# Patient Record
Sex: FEMALE | Race: WHITE | NOT HISPANIC OR LATINO | Employment: FULL TIME | URBAN - METROPOLITAN AREA
[De-identification: names, ages, dates, MRNs, and addresses within clinical notes are randomized per-mention and may not be internally consistent; named-entity substitution may affect disease eponyms.]

---

## 2017-06-08 ENCOUNTER — OFFICE VISIT (OUTPATIENT)
Dept: MEDICAL GROUP | Facility: PHYSICIAN GROUP | Age: 47
End: 2017-06-08
Payer: COMMERCIAL

## 2017-06-08 VITALS
WEIGHT: 203 LBS | BODY MASS INDEX: 32.78 KG/M2 | OXYGEN SATURATION: 96 % | SYSTOLIC BLOOD PRESSURE: 92 MMHG | TEMPERATURE: 99.3 F | DIASTOLIC BLOOD PRESSURE: 60 MMHG | RESPIRATION RATE: 16 BRPM | HEART RATE: 72 BPM

## 2017-06-08 DIAGNOSIS — G89.29 CHRONIC CERVICAL PAIN: ICD-10-CM

## 2017-06-08 DIAGNOSIS — E03.4 HYPOTHYROIDISM DUE TO ACQUIRED ATROPHY OF THYROID: ICD-10-CM

## 2017-06-08 DIAGNOSIS — G43.109 MIGRAINE WITH AURA AND WITHOUT STATUS MIGRAINOSUS, NOT INTRACTABLE: ICD-10-CM

## 2017-06-08 DIAGNOSIS — Z00.00 WELL ADULT EXAM: ICD-10-CM

## 2017-06-08 DIAGNOSIS — M54.2 CHRONIC CERVICAL PAIN: ICD-10-CM

## 2017-06-08 PROCEDURE — 99204 OFFICE O/P NEW MOD 45 MIN: CPT | Performed by: FAMILY MEDICINE

## 2017-06-08 RX ORDER — LEVOTHYROXINE SODIUM 13 UG/1
1 CAPSULE ORAL
Qty: 30 CAP | Refills: 5 | Status: SHIPPED | OUTPATIENT
Start: 2017-06-08 | End: 2017-08-30

## 2017-06-08 RX ORDER — DULOXETIN HYDROCHLORIDE 60 MG/1
60 CAPSULE, DELAYED RELEASE ORAL DAILY
Qty: 30 CAP | Refills: 5 | Status: SHIPPED | OUTPATIENT
Start: 2017-06-08 | End: 2018-06-11 | Stop reason: SDUPTHER

## 2017-06-08 ASSESSMENT — ENCOUNTER SYMPTOMS
CONSTIPATION: 0
CARDIOVASCULAR NEGATIVE: 1
DIZZINESS: 0
PALPITATIONS: 0
COUGH: 0
RESPIRATORY NEGATIVE: 1
EYES NEGATIVE: 1
CHILLS: 0
HEADACHES: 0
PSYCHIATRIC NEGATIVE: 1
MYALGIAS: 1
NEUROLOGICAL NEGATIVE: 1
GASTROINTESTINAL NEGATIVE: 1
FEVER: 0
HEMOPTYSIS: 0
NECK PAIN: 1
CONSTITUTIONAL NEGATIVE: 1

## 2017-06-08 ASSESSMENT — PATIENT HEALTH QUESTIONNAIRE - PHQ9: CLINICAL INTERPRETATION OF PHQ2 SCORE: 0

## 2017-06-08 NOTE — MR AVS SNAPSHOT
Leigh Gutierrez   2017 8:45 AM   Office Visit   MRN: [id number]    Department:  [facility]    Dept Phone:  [phone]    Description:  Female : 1970   Provider:  Steve Lezama M.D.           Reason for Visit     Establish Care     Medication Refill     Referral Needed     Orders Needed mammogram       Allergies as of 2017     Allergen Noted Reactions    Ultram [Tramadol Hcl] 2012   Itching      You were diagnosed with     Chronic cervical pain   [298194]       Migraine with aura and without status migrainosus, not intractable   [980914]       Hypothyroidism due to acquired atrophy of thyroid   [7431644]       Well adult exam   [515528]         Vital Signs     Blood Pressure Pulse Temperature Respirations Weight Oxygen Saturation    92/60 mmHg 72 37.4 °C (99.3 °F) 16 92.08 kg (203 lb) 96%    Smoking Status                   Never Smoker            Basic Information     Date Of Birth Sex Race Ethnicity Preferred Language    1970 Female White Non- English      Problem List              ICD-10-CM Priority Class Noted - Resolved    Anxiety F41.9   2012 - Present    Hypothyroidism E03.9   2012 - Present    Hx of Lyme disease Z86.19   2014 - Present      Health Maintenance        Date Due Completion Dates    IMM DTaP/Tdap/Td Vaccine (1 - Tdap) 1990 ---    MAMMOGRAM 2011 ---    PAP SMEAR 2018 (Done), 2012 (N/S)    Override on 2015: Done    Override on 2012: (N/S)            Current Immunizations     No immunizations on file.      Below and/or attached are the medications your provider expects you to take. Review all of your home medications and newly ordered medications with your provider and/or pharmacist. Follow medication instructions as directed by your provider and/or pharmacist. Please keep your medication list with you and share with your provider. Update the information when medications are discontinued, doses are  changed, or new medications (including over-the-counter products) are added; and carry medication information at all times in the event of emergency situations     Allergies:  ULTRAM - Itching               Medications  Valid as of: June 08, 2017 -  9:22 AM    Generic Name Brand Name Tablet Size Instructions for use    DiazePAM (Tab) VALIUM 5 MG Take 1 Tab by mouth every 8 hours as needed for Anxiety or Sleep.        DULoxetine HCl (Cap DR Particles) CYMBALTA 60 MG Take 1 Cap by mouth every day.        Levothyroxine Sodium (Cap) Levothyroxine Sodium 150 MCG Take 1 Cap by mouth every day.        SUMAtriptan Succinate (Solution) IMITREX 6 MG/0.5ML Inject 6 mg as instructed Once. At onset of migraine        .                 Medicines prescribed today were sent to:     [id number] - [place], [place] - [address] [facility]    [address] [facility] [address]    Phone: [phone] Fax: [phone]    Open 24 Hours?: No      Medication refill instructions:       If your prescription bottle indicates you have medication refills left, it is not necessary to call your provider’s office. Please contact your pharmacy and they will refill your medication.    If your prescription bottle indicates you do not have any refills left, you may request refills at any time through one of the following ways: The online [employer] system (except Urgent Care), by calling your provider’s office, or by asking your pharmacy to contact your provider’s office with a refill request. Medication refills are processed only during regular business hours and may not be available until the next business day. Your provider may request additional information or to have a follow-up visit with you prior to refilling your medication.   *Please Note: Medication refills are assigned a new Rx number when refilled electronically. Your pharmacy may indicate that no refills were authorized even though a new prescription for the same  medication is available at the pharmacy. Please request the medicine by name with the pharmacy before contacting your provider for a refill.        Your To Do List     Future Labs/Procedures Complete By Expires    CBC WITHOUT DIFFERENTIAL  As directed 12/9/2017    COMP METABOLIC PANEL  As directed 6/8/2018    FREE THYROXINE  As directed 6/8/2018    LIPID PROFILE  As directed 6/8/2018    MA-SCREENING DIGITAL MAMMO  As directed 6/8/2018    TRIIDOTHYRONINE  As directed 6/8/2018    TSH  As directed 6/8/2018    VITAMIN D,25 HYDROXY  As directed 6/8/2018      Referral     A referral request has been sent to our patient care coordination department. Please allow 3-5 business days for us to process this request and contact you either by phone or mail. If you do not hear from us by the 5th business day, please call us at [phone].           [employer] Access Code: Activation code not generated  Current [employer] Status: Active

## 2017-06-08 NOTE — PROGRESS NOTES
Subjective:      Leigh Whitley is a 46 y.o. female who presents with Establish Care; Medication Refill; Referral Needed; and Orders Needed            HPI Comments: 1. Chronic cervical pain    - REFERRAL TO PAIN CLINIC  - Levothyroxine Sodium (TIROSINT) 150 MCG Cap; Take 1 Cap by mouth every day.  Dispense: 30 Cap; Refill: 5  - duloxetine (CYMBALTA) 60 MG Cap DR Particles delayed-release capsule; Take 1 Cap by mouth every day.  Dispense: 30 Cap; Refill: 5  - FREE THYROXINE; Future  - COMP METABOLIC PANEL; Future  - LIPID PROFILE; Future  - TRIIDOTHYRONINE; Future  - TSH; Future  - VITAMIN D,25 HYDROXY; Future  - CBC WITHOUT DIFFERENTIAL; Future    2. Migraine with aura and without status migrainosus, not intractable    - REFERRAL TO PAIN CLINIC  - Levothyroxine Sodium (TIROSINT) 150 MCG Cap; Take 1 Cap by mouth every day.  Dispense: 30 Cap; Refill: 5  - duloxetine (CYMBALTA) 60 MG Cap DR Particles delayed-release capsule; Take 1 Cap by mouth every day.  Dispense: 30 Cap; Refill: 5  - FREE THYROXINE; Future  - COMP METABOLIC PANEL; Future  - LIPID PROFILE; Future  - TRIIDOTHYRONINE; Future  - TSH; Future  - VITAMIN D,25 HYDROXY; Future  - CBC WITHOUT DIFFERENTIAL; Future    3. Hypothyroidism due to acquired atrophy of thyroid    - REFERRAL TO PAIN CLINIC  - Levothyroxine Sodium (TIROSINT) 150 MCG Cap; Take 1 Cap by mouth every day.  Dispense: 30 Cap; Refill: 5  - duloxetine (CYMBALTA) 60 MG Cap DR Particles delayed-release capsule; Take 1 Cap by mouth every day.  Dispense: 30 Cap; Refill: 5  - FREE THYROXINE; Future  - COMP METABOLIC PANEL; Future  - LIPID PROFILE; Future  - TRIIDOTHYRONINE; Future  - TSH; Future  - VITAMIN D,25 HYDROXY; Future  - CBC WITHOUT DIFFERENTIAL; Future    4. Well adult exam    - MA-SCREENING DIGITAL MAMMO; Future    Past Medical History:    Hypothyroid                                                   Lyme disease                                                Past Surgical History:     HYSTERECTOMY, TOTAL ABDOMINAL                    2012          CHOLECYSTECTOMY                                  2002          APPENDECTOMY                                     2012          GASTRIC BANDING LAPAROSCOPIC                     2008          TUBAL LIGATION                                                 LAMINOTOMY                                                     Smoking Status: Never Smoker                      Smokeless Status: Never Used                        Alcohol Use: Yes                Comment: occ    Review of patient's family history indicates:    Cancer                         Mother                      Current outpatient prescriptions: •  Levothyroxine Sodium (TIROSINT) 150 MCG Cap, Take 1 Cap by mouth every day., Disp: 30 Cap, Rfl: 5•  duloxetine (CYMBALTA) 60 MG Cap DR Particles delayed-release capsule, Take 1 Cap by mouth every day., Disp: 30 Cap, Rfl: 5•  diazepam (VALIUM) 5 MG TABS, Take 1 Tab by mouth every 8 hours as needed for Anxiety or Sleep., Disp: 30 Tab, Rfl: 1•  SUMAtriptan Succinate (IMITREX) 6 MG/0.5ML SOLN, Inject 6 mg as instructed Once. At onset of migraine, Disp: , Rfl:         Neck Pain   This is a chronic problem. The current episode started more than 1 year ago. The problem occurs intermittently. The problem has been waxing and waning. Associated with: 'lyme disease' according to patient has had nerve ablations in the past with benefit. The pain is present in the left side and right side. The pain is moderate. Nothing aggravates the symptoms. Pertinent negatives include no chest pain, fever or headaches. Treatments tried: nerve ablations. The treatment provided moderate relief.       Review of Systems   Constitutional: Negative.  Negative for fever and chills.        Past Medical History:    Hypothyroid                                                   Lyme disease                                                Past Surgical History:    HYSTERECTOMY, TOTAL ABDOMINAL                     2012          CHOLECYSTECTOMY                                  2002          APPENDECTOMY                                     2012          GASTRIC BANDING LAPAROSCOPIC                     2008          TUBAL LIGATION                                                 LAMINOTOMY                                                     Smoking Status: Never Smoker                      Smokeless Status: Never Used                        Alcohol Use: Yes                Comment: occ    Review of patient's family history indicates:    Cancer                         Mother                     HENT: Negative.    Eyes: Negative.    Respiratory: Negative.  Negative for cough and hemoptysis.    Cardiovascular: Negative.  Negative for chest pain and palpitations.   Gastrointestinal: Negative.  Negative for constipation.   Genitourinary: Negative.  Negative for dysuria and urgency.   Musculoskeletal: Positive for myalgias and neck pain.   Skin: Negative.  Negative for rash.   Neurological: Negative.  Negative for dizziness and headaches.   Endo/Heme/Allergies: Negative.    Psychiatric/Behavioral: Negative.  Negative for suicidal ideas.          Objective:     BP 92/60 mmHg  Pulse 72  Temp(Src) 37.4 °C (99.3 °F)  Resp 16  Wt 92.08 kg (203 lb)  SpO2 96%     Physical Exam   Constitutional: She is oriented to person, place, and time. No distress.   HENT:   Head: Normocephalic and atraumatic.   Right Ear: External ear normal.   Left Ear: External ear normal.   Nose: Nose normal.   Mouth/Throat: Oropharynx is clear and moist. No oropharyngeal exudate.   Eyes: Pupils are equal, round, and reactive to light. Right eye exhibits no discharge. Left eye exhibits no discharge. No scleral icterus.   Neck: Normal range of motion. Neck supple. No JVD present. No tracheal deviation present. No thyromegaly present.   Cardiovascular: Normal rate, regular rhythm, normal heart sounds and intact distal pulses.  Exam reveals no gallop  and no friction rub.    No murmur heard.  Pulmonary/Chest: Effort normal and breath sounds normal. No stridor. No respiratory distress. She has no wheezes. She has no rales. She exhibits no tenderness.   Abdominal: Soft. She exhibits no distension. There is no tenderness.   Lymphadenopathy:     She has no cervical adenopathy.   Neurological: She is alert and oriented to person, place, and time.   Skin: Skin is warm and dry. She is not diaphoretic.   Psychiatric: Judgment normal.   Nursing note and vitals reviewed.              Assessment/Plan:     1. Chronic cervical pain    - REFERRAL TO PAIN CLINIC  - Levothyroxine Sodium (TIROSINT) 150 MCG Cap; Take 1 Cap by mouth every day.  Dispense: 30 Cap; Refill: 5  - duloxetine (CYMBALTA) 60 MG Cap DR Particles delayed-release capsule; Take 1 Cap by mouth every day.  Dispense: 30 Cap; Refill: 5  - FREE THYROXINE; Future  - COMP METABOLIC PANEL; Future  - LIPID PROFILE; Future  - TRIIDOTHYRONINE; Future  - TSH; Future  - VITAMIN D,25 HYDROXY; Future  - CBC WITHOUT DIFFERENTIAL; Future    2. Migraine with aura and without status migrainosus, not intractable    - REFERRAL TO PAIN CLINIC  - Levothyroxine Sodium (TIROSINT) 150 MCG Cap; Take 1 Cap by mouth every day.  Dispense: 30 Cap; Refill: 5  - duloxetine (CYMBALTA) 60 MG Cap DR Particles delayed-release capsule; Take 1 Cap by mouth every day.  Dispense: 30 Cap; Refill: 5  - FREE THYROXINE; Future  - COMP METABOLIC PANEL; Future  - LIPID PROFILE; Future  - TRIIDOTHYRONINE; Future  - TSH; Future  - VITAMIN D,25 HYDROXY; Future  - CBC WITHOUT DIFFERENTIAL; Future    3. Hypothyroidism due to acquired atrophy of thyroid    - REFERRAL TO PAIN CLINIC  - Levothyroxine Sodium (TIROSINT) 150 MCG Cap; Take 1 Cap by mouth every day.  Dispense: 30 Cap; Refill: 5  - duloxetine (CYMBALTA) 60 MG Cap DR Particles delayed-release capsule; Take 1 Cap by mouth every day.  Dispense: 30 Cap; Refill: 5  - FREE THYROXINE; Future  - COMP METABOLIC  PANEL; Future  - LIPID PROFILE; Future  - TRIIDOTHYRONINE; Future  - TSH; Future  - VITAMIN D,25 HYDROXY; Future  - CBC WITHOUT DIFFERENTIAL; Future    4. Well adult exam    - MA-SCREENING DIGITAL MAMMO; Future

## 2017-06-14 ENCOUNTER — TELEPHONE (OUTPATIENT)
Dept: MEDICAL GROUP | Facility: PHYSICIAN GROUP | Age: 47
End: 2017-06-14

## 2017-06-14 LAB
25(OH)D3+25(OH)D2 SERPL-MCNC: 31.9 NG/ML (ref 30–100)
ALBUMIN SERPL-MCNC: 4.1 G/DL (ref 3.5–5.5)
ALBUMIN/GLOB SERPL: 1.5 {RATIO} (ref 1.2–2.2)
ALP SERPL-CCNC: 82 IU/L (ref 39–117)
ALT SERPL-CCNC: 15 IU/L (ref 0–32)
AST SERPL-CCNC: 21 IU/L (ref 0–40)
BILIRUB SERPL-MCNC: 0.4 MG/DL (ref 0–1.2)
BUN SERPL-MCNC: 18 MG/DL (ref 6–24)
BUN/CREAT SERPL: 20 (ref 9–23)
CALCIUM SERPL-MCNC: 9.3 MG/DL (ref 8.7–10.2)
CHLORIDE SERPL-SCNC: 103 MMOL/L (ref 96–106)
CHOLEST SERPL-MCNC: 203 MG/DL (ref 100–199)
CO2 SERPL-SCNC: 24 MMOL/L (ref 18–29)
COMMENT 011824: ABNORMAL
CREAT SERPL-MCNC: 0.9 MG/DL (ref 0.57–1)
ERYTHROCYTE [DISTWIDTH] IN BLOOD BY AUTOMATED COUNT: 13.2 % (ref 12.3–15.4)
GLOBULIN SER CALC-MCNC: 2.7 G/DL (ref 1.5–4.5)
GLUCOSE SERPL-MCNC: 94 MG/DL (ref 65–99)
HCT VFR BLD AUTO: 43.7 % (ref 34–46.6)
HDLC SERPL-MCNC: 42 MG/DL
HGB BLD-MCNC: 14.3 G/DL (ref 11.1–15.9)
LDLC SERPL CALC-MCNC: 131 MG/DL (ref 0–99)
MCH RBC QN AUTO: 31.2 PG (ref 26.6–33)
MCHC RBC AUTO-ENTMCNC: 32.7 G/DL (ref 31.5–35.7)
MCV RBC AUTO: 95 FL (ref 79–97)
NRBC BLD AUTO-RTO: NORMAL %
PLATELET # BLD AUTO: 220 X10E3/UL (ref 150–379)
POTASSIUM SERPL-SCNC: 4.2 MMOL/L (ref 3.5–5.2)
PROT SERPL-MCNC: 6.8 G/DL (ref 6–8.5)
RBC # BLD AUTO: 4.59 X10E6/UL (ref 3.77–5.28)
SODIUM SERPL-SCNC: 141 MMOL/L (ref 134–144)
T3FREE SERPL-MCNC: 3.4 PG/ML (ref 2–4.4)
T4 FREE SERPL-MCNC: 1.2 NG/DL (ref 0.82–1.77)
TRIGL SERPL-MCNC: 150 MG/DL (ref 0–149)
TSH SERPL DL<=0.005 MIU/L-ACNC: 0.76 UIU/ML (ref 0.45–4.5)
VLDLC SERPL CALC-MCNC: 30 MG/DL (ref 5–40)
WBC # BLD AUTO: 4.3 X10E3/UL (ref 3.4–10.8)

## 2017-06-14 NOTE — TELEPHONE ENCOUNTER
Phone Number Called: [phone] (home)       Message: Left vm relaying the Dr's message to the patient     Left Message for patient to call back yes if patient has any questions

## 2017-08-29 ENCOUNTER — OFFICE VISIT (OUTPATIENT)
Dept: MEDICAL GROUP | Facility: PHYSICIAN GROUP | Age: 47
End: 2017-08-29
Payer: COMMERCIAL

## 2017-08-29 DIAGNOSIS — H35.061 RETINAL VASCULITIS, RIGHT: ICD-10-CM

## 2017-08-29 DIAGNOSIS — Z86.19 HX OF LYME DISEASE: ICD-10-CM

## 2017-08-29 DIAGNOSIS — Z12.31 ENCOUNTER FOR SCREENING MAMMOGRAM FOR BREAST CANCER: ICD-10-CM

## 2017-08-29 DIAGNOSIS — R09.81 NASAL CONGESTION: ICD-10-CM

## 2017-08-29 DIAGNOSIS — E66.9 OBESITY (BMI 30-39.9): ICD-10-CM

## 2017-08-29 DIAGNOSIS — E03.4 HYPOTHYROIDISM DUE TO ACQUIRED ATROPHY OF THYROID: ICD-10-CM

## 2017-08-29 PROBLEM — H35.069 RETINAL VASCULITIS: Status: ACTIVE | Noted: 2017-08-29

## 2017-08-29 PROBLEM — F12.10 MARIJUANA ABUSE: Status: ACTIVE | Noted: 2017-08-29

## 2017-08-29 PROCEDURE — 99214 OFFICE O/P EST MOD 30 MIN: CPT | Performed by: NURSE PRACTITIONER

## 2017-08-29 RX ORDER — FLUTICASONE PROPIONATE 50 MCG
2 SPRAY, SUSPENSION (ML) NASAL 2 TIMES DAILY PRN
Qty: 1 BOTTLE | Refills: 3 | Status: SHIPPED | OUTPATIENT
Start: 2017-08-29 | End: 2019-03-07

## 2017-08-29 RX ORDER — LEVOTHYROXINE SODIUM 0.15 MG/1
150 TABLET ORAL
Qty: 30 TAB | Refills: 0 | Status: SHIPPED | OUTPATIENT
Start: 2017-08-29 | End: 2017-12-05 | Stop reason: SDUPTHER

## 2017-08-29 ASSESSMENT — PAIN SCALES - GENERAL: PAINLEVEL: NO PAIN

## 2017-08-29 NOTE — ASSESSMENT & PLAN NOTE
"She is here for post-hospital follow up. Reviewed hospital notes today. On 8/26/17 she was admitted to [facility] ED by opthalmology for intermittent blurred vision of R eye x 2 weeks and dx with retinal vasculitis. She had labs drawn: CMP, CBC,CRP, and SR WNL. MRI and lumbar lumbar puncture ordered- both WNL. She was discharged with referral to rheumatology to r/o systemic diease. She was also advised to follow up with opthalmology.    She was seen by opthalmology today 8/29/17. She states MD reported \"progressive swelling\" of retinal vessels and recommended referral to infectious disease for hx of lyme disease obtained in 1998. Today, she states her vision has slightly improved.    We will request records from [facility]- Dr. Lilly Cortez today.  "

## 2017-08-30 VITALS
DIASTOLIC BLOOD PRESSURE: 72 MMHG | HEART RATE: 69 BPM | TEMPERATURE: 98.1 F | WEIGHT: 203 LBS | RESPIRATION RATE: 16 BRPM | OXYGEN SATURATION: 96 % | SYSTOLIC BLOOD PRESSURE: 110 MMHG | BODY MASS INDEX: 32.62 KG/M2 | HEIGHT: 66 IN

## 2017-08-30 PROBLEM — R09.81 NASAL CONGESTION: Status: ACTIVE | Noted: 2017-08-30

## 2017-08-30 PROBLEM — E66.9 OBESITY (BMI 30-39.9): Status: ACTIVE | Noted: 2017-08-30

## 2017-08-30 PROBLEM — Z91.09 ENVIRONMENTAL ALLERGIES: Status: ACTIVE | Noted: 2017-08-30

## 2017-08-30 NOTE — ASSESSMENT & PLAN NOTE
She is requesting to have thyroid medication changed due to cost. She is currently taking Tirosint 150mg daily. Denies weight gain, depression, skin, nail, or hair changes, constipation, or fatigue.

## 2017-08-30 NOTE — ASSESSMENT & PLAN NOTE
C/o nasal congestion and sneezing. She does report hx of seasonal allergies, however she is not currently taking anything to control sx. Denies cough, fever, chills, sore throat, or sinus pressure. Denies any sick contacts.

## 2017-08-30 NOTE — PROGRESS NOTES
"Subjective:   Leigh Whitley is a 47 y.o. female here today for post-hospital follow-up for retinal vasculitis.    Retinal vasculitis  She is here for post-hospital follow up. Reviewed hospital notes today. On 8/26/17 she was admitted to [facility] ED by opthalmology for intermittent blurred vision of R eye x 2 weeks and dx with retinal vasculitis. She had labs drawn: CMP, CBC,CRP, and SR WNL. MRI and lumbar lumbar puncture ordered- both WNL. She was discharged with referral to rheumatology to r/o systemic diease. She was also advised to follow up with opthalmology.    She was seen by opthalmology today 8/29/17. She states MD reported \"progressive swelling\" of retinal vessels and recommended referral to infectious disease for hx of lyme disease obtained in 1998. Today, she states her vision has slightly improved.    We will request records from [facility]- Dr. Lilly Cortez today.    Hypothyroidism  She is requesting to have thyroid medication changed due to cost. She is currently taking Tirosint 150mg daily. Denies weight gain, depression, skin, nail, or hair changes, constipation, or fatigue.                                                                                                             Nasal congestion  C/o nasal congestion and sneezing. She does report hx of seasonal allergies, however she is not currently taking anything to control sx. Denies cough, fever, chills, sore throat, or sinus pressure. Denies any sick contacts.     Current medicines (including changes today)  Current Outpatient Prescriptions   Medication Sig Dispense Refill   • levothyroxine (SYNTHROID) 150 MCG Tab Take 1 Tab by mouth Every morning on an empty stomach. 30 Tab 0   • fluticasone (FLONASE) 50 MCG/ACT nasal spray Spray 2 Sprays in nose 2 times a day as needed. 1 Bottle 3   • duloxetine (CYMBALTA) 60 MG Cap DR Particles delayed-release capsule Take 1 Cap by mouth every day. 30 Cap 5     No current " facility-administered medications for this visit.      She  has a past medical history of Hypothyroid and Lyme disease.    ROS  Constitutional ROS: No unexpected change in weight, No weakness, No fatigue, No unexplained fevers, sweats, or chills  Eye ROS: No eye pain, redness, discharge. Positive for blurry vision of R eye.  Ear ROS: No ear pain, No recent change in hearing  Mouth/Throat ROS: No sore throat  Pulmonary ROS: No chronic cough, sputum, or hemoptysis, No wheezing, No shortness of breath  Cardiovascular ROS: No chest pain, No edema, No palpitations  Gastrointestinal ROS: No abdominal pain, No significant change in appetite, No nausea, vomiting, diarrhea, or constipation  Musculoskeletal/Extremities ROS: No pain, redness or swelling on the joints  Skin/Integumentary ROS: No evidence of rash  Neurologic ROS: No chronic headaches  Psychiatric ROS: No depression, No anxiety       Objective:     Blood pressure 110/72, pulse 69, temperature 36.7 °C (98.1 °F), resp. rate 16, weight 92.1 kg (203 lb), SpO2 96 %. Body mass index is 32.77 kg/m².   Physical Exam:  Constitutional: Alert, no distress.  Skin: Warm, dry, good turgor, no rashes in visible areas.  Eye: Equal, round and reactive, conjunctiva clear, lids normal.  ENMT: Lips without lesions, good dentition, oropharynx clear. Both ears: external ear canals clear, no redness or drainage.TM intact, pearly grey with landmarks visualized. Nasal mucosa erythematous, swollen, clear nasal drainage.  Neck: Supple. Trachea midline, no masses, no thyromegaly. No cervical or supraclavicular lymphadenopathy  Respiratory: Unlabored respiratory effort, lungs clear to auscultation, no wheezes, no rhonchi.  Cardiovascular: Normal S1, S2, no murmur, no edema.  Psych: Alert and oriented x3, normal affect and mood.    Assessment and Plan:   The following treatment plan was discussed    1. Retinal vasculitis, right  Stable, uncontrolled. Request records from [facility] today.  - REFERRAL TO INFECTIOUS DISEASE    2. Hx of Lyme disease  Stable, uncontrolled.  - REFERRAL TO INFECTIOUS DISEASE    3. Hypothyroidism due to acquired atrophy of thyroid  Stable, well-controlled. Patient will complete bottle of Tirosint, then switch to Synthroid 150mcg. Will plan to draw TSH & T4 levels 6-8 weeks following initiation of new med.  - levothyroxine (SYNTHROID) 150 MCG Tab; Take 1 Tab by mouth Every morning on an empty stomach.  Dispense: 30 Tab; Refill: 0  - TSH WITH REFLEX TO FT4; Future    4. Nasal congestion  Ordered Flonase nasal spray. Recommend using OTC antihistamine daily during   - fluticasone (FLONASE) 50 MCG/ACT nasal spray; Spray 2 Sprays in nose 2 times a day as needed.  Dispense: 1 Bottle; Refill: 3    5. Encounter for screening mammogram for breast cancer  Ordered mammogram today. She was strongly encouraged to schedule appointment.  - MA-SCREEN MAMMO W/CAD-BILAT    6. Obesity (BMI 30-39.9)  Recommend healthy lifestyle modifications, including diet rich in fruits, veggies, whole grains, and low in fat, as well as 40-60min of moderate intense physical activity. Will plan to discuss further at next office visit.  - Patient identified as having weight management issue.  Appropriate orders and counseling given.      Total 25 min spent face-to- face, >50% of total time discussing patient issues and symptoms as listed above in assessment and plan, as well as managing coordination of care for further evaluation and treatment.       Followup: Return if symptoms worsen or fail to improve.       I have reviewed and agree with history, assessment and plan for the office encounter with this mid level provider  No face to face encounter  Suggested changes or f/u : none other than listed  Signed by Steve Lezama M.D., MSc, physician supervisor for this midlevel

## 2017-09-01 ENCOUNTER — TELEPHONE (OUTPATIENT)
Dept: MEDICAL GROUP | Facility: PHYSICIAN GROUP | Age: 47
End: 2017-09-01

## 2017-09-01 NOTE — TELEPHONE ENCOUNTER
For patient to be seen at infectious disease they need records from when patient was first diagnosed with Lyme disease. The doctor she went [place] was in [place] and the Dr was jon plascencia [place], I called on Friday but the office will close, I will call again on Tuesday to see if I can get the records from when she first got diagnosed with lyme disease. There number is [phone]. Then I will have to [facility] and call them at [phone] and get her current records from there. Once records are collected, They will be sent to the infectious disease office at [phone] which is the fax number.

## 2017-09-05 NOTE — TELEPHONE ENCOUNTER
Called [facility] at [phone] and requested records for this patient, once I get them I will send these and the records from when she was first diagnosed with lyme disease to infectious disease.

## 2017-09-28 ENCOUNTER — HOSPITAL ENCOUNTER (OUTPATIENT)
Dept: PAIN MANAGEMENT | Facility: REHABILITATION | Age: 47
End: 2017-09-28
Attending: PAIN MEDICINE
Payer: COMMERCIAL

## 2017-09-28 RX ORDER — BUPIVACAINE HYDROCHLORIDE 7.5 MG/ML
INJECTION, SOLUTION EPIDURAL; RETROBULBAR
Status: DISPENSED
Start: 2017-09-28 | End: 2017-09-28

## 2017-09-28 RX ORDER — TRIAMCINOLONE ACETONIDE 40 MG/ML
INJECTION, SUSPENSION INTRA-ARTICULAR; INTRAMUSCULAR
Status: DISPENSED
Start: 2017-09-28 | End: 2017-09-28

## 2017-09-28 RX ORDER — LIDOCAINE HYDROCHLORIDE 40 MG/ML
INJECTION, SOLUTION RETROBULBAR
Status: DISPENSED
Start: 2017-09-28 | End: 2017-09-28

## 2017-09-28 NOTE — PROGRESS NOTES
Case was cancelled by pt,  Pt. On antibiotics and refuse facet injection procedure, wanted a neurotomy.  Advised pt to call the office and set up an appointment to talk with Dr. Miner. Informed Supervisor of issue and Dr. Miner was informed by her.

## 2017-12-05 DIAGNOSIS — E03.4 HYPOTHYROIDISM DUE TO ACQUIRED ATROPHY OF THYROID: ICD-10-CM

## 2017-12-05 RX ORDER — LEVOTHYROXINE SODIUM 0.15 MG/1
TABLET ORAL
Qty: 30 TAB | Refills: 0 | Status: SHIPPED | OUTPATIENT
Start: 2017-12-05 | End: 2018-02-09 | Stop reason: SDUPTHER

## 2017-12-05 NOTE — TELEPHONE ENCOUNTER
Was the patient seen in the last year in this department? Yes     Does patient have an active prescription for medications requested? Yes     Received Request Via: Pharmacy     Last office visit 08/29/17  Last labs 06/13/17

## 2018-02-09 ENCOUNTER — OFFICE VISIT (OUTPATIENT)
Dept: MEDICAL GROUP | Facility: PHYSICIAN GROUP | Age: 48
End: 2018-02-09
Payer: COMMERCIAL

## 2018-02-09 VITALS
BODY MASS INDEX: 32.42 KG/M2 | SYSTOLIC BLOOD PRESSURE: 124 MMHG | RESPIRATION RATE: 16 BRPM | TEMPERATURE: 98.2 F | DIASTOLIC BLOOD PRESSURE: 82 MMHG | WEIGHT: 201.7 LBS | HEART RATE: 83 BPM | HEIGHT: 66 IN | OXYGEN SATURATION: 97 %

## 2018-02-09 DIAGNOSIS — E03.4 HYPOTHYROIDISM DUE TO ACQUIRED ATROPHY OF THYROID: ICD-10-CM

## 2018-02-09 DIAGNOSIS — E66.9 OBESITY (BMI 30-39.9): ICD-10-CM

## 2018-02-09 DIAGNOSIS — M79.672 LEFT FOOT PAIN: ICD-10-CM

## 2018-02-09 DIAGNOSIS — Z00.00 HEALTHCARE MAINTENANCE: ICD-10-CM

## 2018-02-09 DIAGNOSIS — F98.8 ADD (ATTENTION DEFICIT DISORDER) WITHOUT HYPERACTIVITY: ICD-10-CM

## 2018-02-09 DIAGNOSIS — K21.9 GASTROESOPHAGEAL REFLUX DISEASE, ESOPHAGITIS PRESENCE NOT SPECIFIED: ICD-10-CM

## 2018-02-09 PROBLEM — Z98.84 STATUS POST BARIATRIC SURGERY: Status: ACTIVE | Noted: 2018-02-09

## 2018-02-09 PROBLEM — R11.0 NAUSEA: Status: ACTIVE | Noted: 2018-02-09

## 2018-02-09 PROCEDURE — 99215 OFFICE O/P EST HI 40 MIN: CPT | Performed by: NURSE PRACTITIONER

## 2018-02-09 RX ORDER — OMEPRAZOLE 20 MG/1
20 CAPSULE, DELAYED RELEASE ORAL DAILY
Qty: 90 CAP | Refills: 0 | Status: SHIPPED | OUTPATIENT
Start: 2018-02-09 | End: 2019-03-07

## 2018-02-09 RX ORDER — LEVOTHYROXINE SODIUM 0.15 MG/1
150 TABLET ORAL
Qty: 90 TAB | Refills: 0 | Status: SHIPPED | OUTPATIENT
Start: 2018-02-09 | End: 2018-06-11 | Stop reason: SDUPTHER

## 2018-02-09 ASSESSMENT — ENCOUNTER SYMPTOMS
ARTHRALGIAS: 1
CHOKING: 1
ABDOMINAL PAIN: 0
NUMBNESS: 0
BLOOD IN STOOL: 0
DIARRHEA: 0
WEAKNESS: 0
COUGH: 0
HEARTBURN: 1
GLOBUS SENSATION: 1
NAUSEA: 1
SORE THROAT: 1
FEVER: 0
NERVOUS/ANXIOUS: 1
WHEEZING: 0
CONSTIPATION: 0
SHORTNESS OF BREATH: 0
DIZZINESS: 0
JOINT SWELLING: 0
PALPITATIONS: 0
HEADACHES: 0
VOMITING: 0

## 2018-02-09 NOTE — PROGRESS NOTES
Subjective:   Cherri Shah is a 47 y.o. female here today for the following concerns:    ADD (attention deficit disorder) without hyperactivity  Chronic in nature. Symptoms include being fidgety, restless, difficulty concentrating, and difficulty completing tasks. Reports that she has never been clinically diagnosed with ADD, but believes she has always had symptoms. She feels that her symptoms are significantly affecting her work performance and ability to complete ADLS, including remembering to take medications.    Hypothyroidism  Chronic in nature. Her symptoms are currently controlled with levothyroxine 150mcg daily. She denies weight gain, depression, skin or nail changes, constipation, or fatigue. Her last thyroid levels were WNL. She is requesting refill today.     Results for CHERRI SHAH (MRN [id number]) as of 2/9/2018 17:50   Ref. Range 6/13/2017 07:37   TSH Latest Ref Range: 0.450 - 4.500 uIU/mL 0.758   Free T-4 Latest Ref Range: 0.82 - 1.77 ng/dL 1.20   T3,Free Latest Ref Range: 2.0 - 4.4 pg/mL 3.4      Foot Problem   This is a new problem. The current episode started more than 1 month ago (Located L foot). The problem occurs intermittently. The problem has been unchanged. Associated symptoms include arthralgias, nausea and a sore throat. Pertinent negatives include no abdominal pain, chest pain, coughing, fever, headaches, joint swelling, numbness, vomiting or weakness. Associated symptoms comments: Denies redness, swelling, or decreased ROM.. The symptoms are aggravated by walking. She has tried nothing for the symptoms.   Gastrophageal Reflux   She complains of choking, dysphagia, globus sensation, heartburn, nausea and a sore throat. She reports no abdominal pain, no chest pain, no coughing or no wheezing. This is a new problem. The current episode started more than 1 month ago. The problem occurs frequently. The problem has been unchanged. The heartburn is located in the substernum. The  heartburn is of moderate intensity. The symptoms are aggravated by certain foods. She has tried a PPI for the symptoms. The treatment provided mild relief.   She had had gastric bypass with lap band in 2011.     Current medicines (including changes today)  Current Outpatient Prescriptions   Medication Sig Dispense Refill   • omeprazole (PRILOSEC) 20 MG delayed-release capsule Take 1 Cap by mouth every day. 90 Cap 0   • levothyroxine (SYNTHROID) 150 MCG Tab Take 1 Tab by mouth Every morning on an empty stomach. 90 Tab 0   • duloxetine (CYMBALTA) 60 MG Cap DR Particles delayed-release capsule Take 1 Cap by mouth every day. 30 Cap 5   • fluticasone (FLONASE) 50 MCG/ACT nasal spray Spray 2 Sprays in nose 2 times a day as needed. 1 Bottle 3     No current facility-administered medications for this visit.      She  has a past medical history of Hypothyroid and Lyme disease.    Social History     Social History   • Marital status:      Spouse name: N/A   • Number of children: N/A   • Years of education: N/A     Occupational History   • Not on file.     Social History Main Topics   • Smoking status: Never Smoker   • Smokeless tobacco: Never Used   • Alcohol use Yes      Comment: occ   • Drug use: Yes     Types: Marijuana      Comment: daily    • Sexual activity: Not Currently     Partners: Male     Other Topics Concern   • Not on file     Social History Narrative   • No narrative on file       Review of Systems   Constitutional: Negative for fever and malaise/fatigue.   HENT: Positive for sore throat.    Respiratory: Positive for choking. Negative for cough, shortness of breath and wheezing.    Cardiovascular: Negative for chest pain and palpitations.   Gastrointestinal: Positive for dysphagia, heartburn and nausea. Negative for abdominal pain, blood in stool, constipation, diarrhea and vomiting.   Genitourinary: Negative for dysuria.   Musculoskeletal: Positive for arthralgias. Negative for joint swelling.  "  Neurological: Negative for dizziness, weakness, numbness and headaches.   Psychiatric/Behavioral: The patient is nervous/anxious.         Objective:     Blood pressure 124/82, pulse 83, temperature 36.8 °C (98.2 °F), resp. rate 16, height 1.676 m (5' 6\"), weight 91.5 kg (201 lb 11.2 oz), SpO2 97 %. Body mass index is 32.56 kg/m².     Physical Exam:  Constitutional: Oriented to person, place, and time and well-developed, well-nourished, and in no distress.   HENT:   Head: Normocephalic and atraumatic.   Mouth/Throat: Oropharynx is clear and moist and mucous membranes are normal. No oropharyngeal exudate or posterior oropharyngeal erythema.   Eyes: Conjunctivae and EOM are normal. Pupils are equal, round, and reactive to light.   Neck: Normal range of motion. Neck supple. No thyromegaly present.   Cardiovascular: Normal rate, regular rhythm, normal heart sounds. Radial and pedal pulses intact. Exam reveals no friction rub. No murmur heard.  Pulmonary/Chest: Effort normal and breath sounds normal. No respiratory distress or use of accessory muscles. No wheezes, rhonchi, or rales.   Abdominal: Soft. Bowel sounds are normal. Exhibits no distension and no mass. There is no tenderness. No hepatosplenomegaly.    Musculoskeletal: Full range of motion. Tenderness with palpation to lateral aspect of L anterior foot. No deformity, redness, or swelling of joints.  Neurological: Alert and oriented to person, place, and time. Gait normal.   Skin: Skin is warm and dry. No cyanosis. No edema.  Psychiatric: Mood, memory, affect and judgment normal.     Assessment and Plan:   The following treatment plan was discussed    1. ADD (attention deficit disorder) without hyperactivity  Uncontrolled. Explained that per federal policy, unable to prescribe controlled substances such as Ritalin or Adderall due to chronic marijuana use. Referred to psychiatry for further evaluation and management.  - REFERRAL TO PSYCHIATRY    2. Hypothyroidism " due to acquired atrophy of thyroid  Stable, controlled. Refill medication today.  - levothyroxine (SYNTHROID) 150 MCG Tab; Take 1 Tab by mouth Every morning on an empty stomach.  Dispense: 90 Tab; Refill: 0    3. Left foot pain  Uncontrolled. Referral to podiatry for further evaluation. Encouraged proper foot attire with wide toe box.  - REFERRAL TO PODIATRY    4. Gastroesophageal reflux disease, esophagitis presence not specified  Uncontrolled. Due to symptoms of globus sensation and dysphagia, referral to GI to r/o stricture or hiatal hernia. Order for PPI.  - omeprazole (PRILOSEC) 20 MG delayed-release capsule; Take 1 Cap by mouth every day.  Dispense: 90 Cap; Refill: 0  - REFERRAL TO GASTROENTEROLOGY    5. Obesity (BMI 30-39.9)  Strongly encourage lifestyle modifications with daily physical activity and healthy balanced diet.  - Patient identified as having weight management issue.  Appropriate orders and counseling given.    6. Healthcare maintenance  Strongly encouraged to schedule mammogram.    Total of 40 minutes spent face-to-face time spent with patient, >50% of the total time discussing patient's issues and symptoms, including ADD, foot pain, GERD, along with health maintenance. Time also spent managing coordination of care for future evaluation and treatment.    Followup: Return if symptoms worsen or fail to improve.

## 2018-02-10 NOTE — ASSESSMENT & PLAN NOTE
Chronic in nature. Her symptoms are currently controlled with levothyroxine 150mcg daily. She denies weight gain, depression, skin or nail changes, constipation, or fatigue. Her last thyroid levels were WNL. She is requesting refill today.     Results for CHERRI SHAH (MRN [id number]) as of 2/9/2018 17:50   Ref. Range 6/13/2017 07:37   TSH Latest Ref Range: 0.450 - 4.500 uIU/mL 0.758   Free T-4 Latest Ref Range: 0.82 - 1.77 ng/dL 1.20   T3,Free Latest Ref Range: 2.0 - 4.4 pg/mL 3.4

## 2018-02-10 NOTE — ASSESSMENT & PLAN NOTE
Chronic in nature. Symptoms include being fidgety, restless, difficulty concentrating, and difficulty completing tasks. Reports that she has never been clinically diagnosed with ADD, but believes she has always had symptoms. She feels that her symptoms are significantly affecting her work performance and ability to complete ADLS, including remembering to take medications.

## 2018-02-10 NOTE — ASSESSMENT & PLAN NOTE
Chronic in nature. Reports that her symptoms have been waxing and waning over the past 3 months. Her symptoms are unrelated to food. She denies vomiting. She has had gastric bypass with lap band in 2011. She has had cholecystomy and appendix removed.

## 2018-06-11 DIAGNOSIS — M54.2 CHRONIC CERVICAL PAIN: ICD-10-CM

## 2018-06-11 DIAGNOSIS — G43.109 MIGRAINE WITH AURA AND WITHOUT STATUS MIGRAINOSUS, NOT INTRACTABLE: ICD-10-CM

## 2018-06-11 DIAGNOSIS — G89.29 CHRONIC CERVICAL PAIN: ICD-10-CM

## 2018-06-11 DIAGNOSIS — E03.4 HYPOTHYROIDISM DUE TO ACQUIRED ATROPHY OF THYROID: ICD-10-CM

## 2018-06-12 RX ORDER — LEVOTHYROXINE SODIUM 0.15 MG/1
TABLET ORAL
Qty: 90 TAB | Refills: 1 | Status: SHIPPED | OUTPATIENT
Start: 2018-06-12 | End: 2019-03-07 | Stop reason: SDUPTHER

## 2018-06-12 RX ORDER — DULOXETIN HYDROCHLORIDE 60 MG/1
CAPSULE, DELAYED RELEASE ORAL
Qty: 90 CAP | Refills: 1 | Status: SHIPPED | OUTPATIENT
Start: 2018-06-12 | End: 2019-03-07

## 2018-06-12 NOTE — TELEPHONE ENCOUNTER
/Was the patient seen in the last year in this department? Yes     Does patient have an active prescription for medications requested? Yes     Received Request Via: Pharmacy     Last Visit: 2/9/18  Last Labs: 6/13/17

## 2018-06-12 NOTE — TELEPHONE ENCOUNTER
Was the patient seen in the last year in this department? Yes     Does patient have an active prescription for medications requested? Yes     Received Request Via: Pharmacy     Last Visit: 2/9/18  Last Labs: 6/13/17

## 2019-03-07 ENCOUNTER — OFFICE VISIT (OUTPATIENT)
Dept: MEDICAL GROUP | Facility: PHYSICIAN GROUP | Age: 49
End: 2019-03-07
Payer: COMMERCIAL

## 2019-03-07 VITALS
WEIGHT: 208 LBS | SYSTOLIC BLOOD PRESSURE: 114 MMHG | RESPIRATION RATE: 14 BRPM | HEART RATE: 66 BPM | OXYGEN SATURATION: 94 % | TEMPERATURE: 98.7 F | DIASTOLIC BLOOD PRESSURE: 72 MMHG | BODY MASS INDEX: 32.65 KG/M2 | HEIGHT: 67 IN

## 2019-03-07 DIAGNOSIS — F98.8 ADD (ATTENTION DEFICIT DISORDER) WITHOUT HYPERACTIVITY: ICD-10-CM

## 2019-03-07 DIAGNOSIS — K21.9 GASTROESOPHAGEAL REFLUX DISEASE WITHOUT ESOPHAGITIS: ICD-10-CM

## 2019-03-07 DIAGNOSIS — F41.9 ANXIETY: ICD-10-CM

## 2019-03-07 DIAGNOSIS — R00.2 PALPITATION: ICD-10-CM

## 2019-03-07 DIAGNOSIS — Z12.31 ENCOUNTER FOR SCREENING MAMMOGRAM FOR BREAST CANCER: ICD-10-CM

## 2019-03-07 DIAGNOSIS — E66.9 OBESITY (BMI 30-39.9): ICD-10-CM

## 2019-03-07 DIAGNOSIS — E78.2 MIXED HYPERLIPIDEMIA: ICD-10-CM

## 2019-03-07 DIAGNOSIS — E03.4 HYPOTHYROIDISM DUE TO ACQUIRED ATROPHY OF THYROID: ICD-10-CM

## 2019-03-07 PROCEDURE — 99214 OFFICE O/P EST MOD 30 MIN: CPT | Performed by: INTERNAL MEDICINE

## 2019-03-07 RX ORDER — LEVOTHYROXINE SODIUM 0.15 MG/1
150 TABLET ORAL
Qty: 90 TAB | Refills: 1 | Status: SHIPPED | OUTPATIENT
Start: 2019-03-07 | End: 2020-03-05

## 2019-03-07 RX ORDER — OMEPRAZOLE 20 MG/1
20 CAPSULE, DELAYED RELEASE ORAL DAILY
Qty: 90 CAP | Refills: 0 | Status: SHIPPED | OUTPATIENT
Start: 2019-03-07 | End: 2020-01-29

## 2019-03-07 RX ORDER — DULOXETIN HYDROCHLORIDE 30 MG/1
30 CAPSULE, DELAYED RELEASE ORAL DAILY
Qty: 90 CAP | Refills: 1 | Status: SHIPPED | OUTPATIENT
Start: 2019-03-07 | End: 2020-01-29 | Stop reason: SDUPTHER

## 2019-03-07 NOTE — PROGRESS NOTES
Established Patient    Leigh Whitley is a 48 y.o. female who presents today with the following:    CC:   Chief Complaint   Patient presents with   • palpitation.       HPI:     Hypothyroidism  Controlled with current dose. 3/7/19 TSH, FT4 wnl. Synthroid refilled.     Obesity (BMI 30-39.9)  Healthful diet and exercise    GERD (gastroesophageal reflux disease)  Chronic stable on PPI. She will call her GI doctor to find out when to follow up.    Anxiety  Stable on duloxetine. Seeing psychology. Ok to see psychiatry.    ADD (attention deficit disorder) without hyperactivity  Did not do well with Adderall. Ok to see psychiatry.     Palpitation  Palpitation and feeling swelling of her jugular vein. Went to [facility] ER. EKG showed Bradycardia. Holter showed Sinus rhythm with PVCs and PACs. Electrolytes unremarkable. CXR normal. She would like to see cardiology as she has intermittent palpitations.      Current Outpatient Prescriptions   Medication Sig Dispense Refill   • levothyroxine (SYNTHROID) 150 MCG Tab Take 1 Tab by mouth Every morning on an empty stomach. 90 Tab 1   • DULoxetine (CYMBALTA) 30 MG Cap DR Particles Take 1 Cap by mouth every day. 90 Cap 1   • omeprazole (PRILOSEC) 20 MG delayed-release capsule Take 1 Cap by mouth every day. 90 Cap 0   • aspirin EC (ECOTRIN) 81 MG Tablet Delayed Response Take 1 Tab by mouth every day. 30 Tab      No current facility-administered medications for this visit.        Allergies, past medical history, past surgical history, medications, family history, social history reviewed and updated.    ROS   Constitutional: Denies fevers or chills  Eyes: Denies changes in vision  Ears/Nose/Throat/Mouth: Denies nasal congestion or sore throat   Cardiovascular: per HPI  Respiratory: Denies shortness of breath , Denies cough  Gastrointestinal/Hepatic: intermittent nauseaDenies abd pain, vomiting   Genitourinary: Denies dysuria or frequency  Musculoskeletal/Rheum: Denies joint pain  "and swelling   Neurological: Denies headache  Psychiatric: ADD, anxiety Denies mood disorder   Endocrine: hypothyroidism Denies hx of diabetes   Heme/Oncology/Lymph Nodes: Denies weight changes or enlarged LNs.    Physical Exam  Vitals: /72 (BP Location: Left arm, Patient Position: Sitting, BP Cuff Size: Adult)   Pulse 66   Temp 37.1 °C (98.7 °F) (Temporal)   Resp 14   Ht 1.702 m (5' 7\")   Wt 94.3 kg (208 lb)   SpO2 94%   BMI 32.58 kg/m²   General: Alert, pleasant, NAD  HEENT: Normocephalic.  EOMI, no icterus or pallor.  Conjunctivae and lids normal. External ears normal. Oropharynx non-erythematous, mucous membranes moist.  Neck supple.  No thyromegaly or masses palpated.   Lymph: No cervical or supraclavicular lymphadenopathy. Jugular vein distention?  Cardiovascular: Regular rate and rhythm.   No murmurs appreciated.  Respiratory: Normal respiratory effort.  Clear to auscultation bilaterally.  Abdomen: Non-distended, soft  Skin: Warm, dry, no rashes.  Musculoskeletal: Gait is normal.  Moves all extremities well.  Extremities: No leg edema.  radial pulses 2+ symmetric.   Psych:  Affect/mood is normal, judgement is good, memory is intact, grooming is appropriate.      Labs (2/28/19) were reviewed and discussed with patients. All questions were answered.  Imaging (2/28/19) was reviewed.    Assessment and Plan    Leigh was seen today for palpitation.    Diagnoses and all orders for this visit:    Palpitation  -     EC-ECHOCARDIOGRAM COMPLETE W/O CONT; Future  -     US-SOFT TISSUES OF HEAD - NECK; Future  -     REFERRAL TO CARDIOLOGY  - thyroid function normal.    Hypothyroidism due to acquired atrophy of thyroid  -     US-SOFT TISSUES OF HEAD - NECK; Future  -     levothyroxine (SYNTHROID) 150 MCG Tab; Take 1 Tab by mouth Every morning on an empty stomach.    Anxiety  -     REFERRAL TO PSYCHIATRY  -     DULoxetine (CYMBALTA) 30 MG Cap DR Particles; Take 1 Cap by mouth every day.    ADD (attention " deficit disorder) without hyperactivity  -     REFERRAL TO PSYCHIATRY    Gastroesophageal reflux disease without esophagitis  -     omeprazole (PRILOSEC) 20 MG delayed-release capsule; Take 1 Cap by mouth every day.    Encounter for screening mammogram for breast cancer  -     MA-SCREENING MAMMO BILAT W/TOMOSYNTHESIS W/CAD; Future    Mixed hyperlipidemia  -     Lipid Profile; Future.  -     Healthful diet and exercise    Obesity (BMI 30-39.9)  Healthful diet and exercise      Follow-up: with PCP on 3/27 for annual wellness and possibly pap smear. She may need Tdap if not already given    This note was created using voice recognition software. There may be unintended errors in spelling, grammar or content.

## 2019-03-07 NOTE — ASSESSMENT & PLAN NOTE
Palpitation and feeling swelling of her jugular vein. Went to [facility] ER. EKG showed Bradycardia. Holter showed Sinus rhythm with PVCs and PACs. Electrolytes unremarkable. CXR normal. She would like to see cardiology as she has intermittent palpitations.

## 2019-04-08 ENCOUNTER — HOSPITAL ENCOUNTER (OUTPATIENT)
Dept: LAB | Facility: MEDICAL CENTER | Age: 49
End: 2019-04-08
Attending: INTERNAL MEDICINE
Payer: COMMERCIAL

## 2019-04-08 DIAGNOSIS — E78.2 MIXED HYPERLIPIDEMIA: ICD-10-CM

## 2019-04-08 LAB
CHOLEST SERPL-MCNC: 175 MG/DL (ref 100–199)
FASTING STATUS PATIENT QL REPORTED: NORMAL
HDLC SERPL-MCNC: 39 MG/DL
LDLC SERPL CALC-MCNC: 120 MG/DL
TRIGL SERPL-MCNC: 81 MG/DL (ref 0–149)

## 2019-04-08 PROCEDURE — 80061 LIPID PANEL: CPT

## 2019-04-08 PROCEDURE — 36415 COLL VENOUS BLD VENIPUNCTURE: CPT

## 2019-04-09 ENCOUNTER — TELEPHONE (OUTPATIENT)
Dept: MEDICAL GROUP | Facility: PHYSICIAN GROUP | Age: 49
End: 2019-04-09

## 2019-04-09 NOTE — TELEPHONE ENCOUNTER
----- Message from Shimon Kelly M.D. sent at 4/9/2019  8:29 AM PDT -----  Please call patient and let patient schedule with her PCP Ondina to discuss about the results in two weeks. Thank you.   Thanks!

## 2019-04-16 ENCOUNTER — TELEPHONE (OUTPATIENT)
Dept: MEDICAL GROUP | Facility: PHYSICIAN GROUP | Age: 49
End: 2019-04-16

## 2019-05-02 ENCOUNTER — TELEPHONE (OUTPATIENT)
Dept: MEDICAL GROUP | Facility: PHYSICIAN GROUP | Age: 49
End: 2019-05-02

## 2019-05-02 DIAGNOSIS — N63.10 BREAST MASS, RIGHT: ICD-10-CM

## 2019-05-02 NOTE — TELEPHONE ENCOUNTER
Erin with [facility] called regarding the patients mammo. She is requesting that we send an diagnostic with US prn dx: R92.2    She is requesting this be faxed to [phone]

## 2019-05-02 NOTE — TELEPHONE ENCOUNTER
You will need to contact them to determine for which breast. Thank you.    DAVIDSON Acharya,PEEWEE-C

## 2020-01-29 ENCOUNTER — OFFICE VISIT (OUTPATIENT)
Dept: MEDICAL GROUP | Facility: PHYSICIAN GROUP | Age: 50
End: 2020-01-29
Payer: COMMERCIAL

## 2020-01-29 VITALS
OXYGEN SATURATION: 95 % | TEMPERATURE: 98.3 F | HEART RATE: 68 BPM | DIASTOLIC BLOOD PRESSURE: 70 MMHG | BODY MASS INDEX: 30.73 KG/M2 | HEIGHT: 67 IN | SYSTOLIC BLOOD PRESSURE: 104 MMHG | WEIGHT: 195.8 LBS

## 2020-01-29 DIAGNOSIS — F41.9 ANXIETY: ICD-10-CM

## 2020-01-29 DIAGNOSIS — F33.2 SEVERE EPISODE OF RECURRENT MAJOR DEPRESSIVE DISORDER, WITHOUT PSYCHOTIC FEATURES (HCC): ICD-10-CM

## 2020-01-29 DIAGNOSIS — E03.4 HYPOTHYROIDISM DUE TO ACQUIRED ATROPHY OF THYROID: ICD-10-CM

## 2020-01-29 DIAGNOSIS — Z00.00 ANNUAL PHYSICAL EXAM: ICD-10-CM

## 2020-01-29 PROBLEM — F33.1 MODERATE EPISODE OF RECURRENT MAJOR DEPRESSIVE DISORDER (HCC): Status: ACTIVE | Noted: 2020-01-29

## 2020-01-29 PROBLEM — R00.2 PALPITATION: Status: RESOLVED | Noted: 2019-03-07 | Resolved: 2020-01-29

## 2020-01-29 PROBLEM — R11.0 NAUSEA: Status: RESOLVED | Noted: 2018-02-09 | Resolved: 2020-01-29

## 2020-01-29 PROBLEM — F32.9 MAJOR DEPRESSION: Status: ACTIVE | Noted: 2020-01-29

## 2020-01-29 PROBLEM — F12.90 MARIJUANA USE: Status: ACTIVE | Noted: 2017-08-29

## 2020-01-29 PROBLEM — M79.672 LEFT FOOT PAIN: Status: RESOLVED | Noted: 2018-02-09 | Resolved: 2020-01-29

## 2020-01-29 PROBLEM — R09.81 NASAL CONGESTION: Status: RESOLVED | Noted: 2017-08-30 | Resolved: 2020-01-29

## 2020-01-29 PROCEDURE — 99214 OFFICE O/P EST MOD 30 MIN: CPT | Performed by: NURSE PRACTITIONER

## 2020-01-29 RX ORDER — DULOXETIN HYDROCHLORIDE 30 MG/1
60 CAPSULE, DELAYED RELEASE ORAL DAILY
Qty: 90 CAP | Refills: 1 | Status: SHIPPED | OUTPATIENT
Start: 2020-01-29 | End: 2020-12-10 | Stop reason: SDUPTHER

## 2020-01-29 ASSESSMENT — ANXIETY QUESTIONNAIRES
1. FEELING NERVOUS, ANXIOUS, OR ON EDGE: MORE THAN HALF THE DAYS
6. BECOMING EASILY ANNOYED OR IRRITABLE: MORE THAN HALF THE DAYS
2. NOT BEING ABLE TO STOP OR CONTROL WORRYING: NEARLY EVERY DAY
5. BEING SO RESTLESS THAT IT IS HARD TO SIT STILL: SEVERAL DAYS
7. FEELING AFRAID AS IF SOMETHING AWFUL MIGHT HAPPEN: MORE THAN HALF THE DAYS
3. WORRYING TOO MUCH ABOUT DIFFERENT THINGS: NEARLY EVERY DAY
4. TROUBLE RELAXING: MORE THAN HALF THE DAYS
GAD7 TOTAL SCORE: 15

## 2020-01-29 ASSESSMENT — PATIENT HEALTH QUESTIONNAIRE - PHQ9
SUM OF ALL RESPONSES TO PHQ QUESTIONS 1-9: 21
5. POOR APPETITE OR OVEREATING: 3 - NEARLY EVERY DAY
CLINICAL INTERPRETATION OF PHQ2 SCORE: 4

## 2020-01-29 ASSESSMENT — ENCOUNTER SYMPTOMS
FEVER: 0
SHORTNESS OF BREATH: 0
INSOMNIA: 1
DEPRESSION: 1
NERVOUS/ANXIOUS: 1
CHILLS: 0

## 2020-01-29 NOTE — PROGRESS NOTES
Subjective:   Leigh Whitley is a 49 y.o. female here today for concerns of depression and anxiety.    Severe episode of recurrent major depressive disorder, without psychotic features (HCC)  This is a chronic issue. She reports worsening of symptoms over the past few months. Her symptoms include fatigue, intense emotion, difficulties concentrating, and excessive worry. She denies suicidal or homicidal ideation. Her symptoms are aggravated by son who is a heroin addict and divorce that occurred 4 years ago. She has been coping through exercise, spiritual practice, and grief counseling. She is currently taking Cymbalta 30mg daily which she has been taking for several years.        Current medicines (including changes today)  Current Outpatient Medications   Medication Sig Dispense Refill   • DULoxetine (CYMBALTA) 30 MG Cap DR Particles Take 2 Caps by mouth every day. 90 Cap 1   • levothyroxine (SYNTHROID) 150 MCG Tab Take 1 Tab by mouth Every morning on an empty stomach. 90 Tab 1   • aspirin EC (ECOTRIN) 81 MG Tablet Delayed Response Take 1 Tab by mouth every day. 30 Tab      No current facility-administered medications for this visit.      She  has a past medical history of Hypothyroid and Lyme disease.    Social History     Socioeconomic History   • Marital status:      Spouse name: Not on file   • Number of children: Not on file   • Years of education: Not on file   • Highest education level: Not on file   Occupational History   • Not on file   Social Needs   • Financial resource strain: Not on file   • Food insecurity:     Worry: Not on file     Inability: Not on file   • Transportation needs:     Medical: Not on file     Non-medical: Not on file   Tobacco Use   • Smoking status: Never Smoker   • Smokeless tobacco: Never Used   Substance and Sexual Activity   • Alcohol use: Yes     Comment: occ   • Drug use: Yes     Types: Marijuana     Comment: daily    • Sexual activity: Not Currently     Partners:  Male   Lifestyle   • Physical activity:     Days per week: Not on file     Minutes per session: Not on file   • Stress: Not on file   Relationships   • Social connections:     Talks on phone: Not on file     Gets together: Not on file     Attends Gnosticism service: Not on file     Active member of club or organization: Not on file     Attends meetings of clubs or organizations: Not on file     Relationship status: Not on file   • Intimate partner violence:     Fear of current or ex partner: Not on file     Emotionally abused: Not on file     Physically abused: Not on file     Forced sexual activity: Not on file   Other Topics Concern   • Not on file   Social History Narrative   • Not on file       Review of Systems   Constitutional: Positive for malaise/fatigue. Negative for chills and fever.   Respiratory: Negative for shortness of breath.    Cardiovascular: Negative for chest pain.   Psychiatric/Behavioral: Positive for depression. Negative for suicidal ideas. The patient is nervous/anxious and has insomnia.      SURYA-7 Questionnaire    Feeling nervous, anxious, or on edge: More than half the days  Not being able to sop or control worrying: Nearly every day  Worrying too much about different things: Nearly every day  Trouble relaxing: More than half the days  Being so restless that it's hard to sit still: Several days  Becoming easily annoyed or irritable: More than half the days  Feeling afraid as if something awful might happen: More than half the days  Total: 15    Interpretation of SURYA 7 Total Score   Score Severity :  0-4 No Anxiety   5-9 Mild Anxiety  10-14 Moderate Anxiety  15-21 Severe Anxiety    Depression Screening    Little interest or pleasure in doing things?  3 - nearly every day   Feeling down, depressed , or hopeless? 1 - several days   Trouble falling or staying asleep, or sleeping too much?  3 - nearly every day   Feeling tired or having little energy?  3 - nearly every day   Poor appetite or  "overeating?  3 - nearly every day   Feeling bad about yourself - or that you are a failure or have let yourself or your family down? 2 - more than half the days   Trouble concentrating on things, such as reading the newspaper or watching television? 3 - nearly every day   Moving or speaking so slowly that other people could have noticed.  Or the opposite - being so fidgety or restless that you have been moving around a lot more than usual?  3 - nearly every day   Thoughts that you would be better off dead, or of hurting yourself?  0 - not at all   Patient Health Questionnaire Score: 21       If depressive symptoms identified deferred to follow up visit unless specifically addressed in assesment and plan.    Interpretation of PHQ-9 Total Score   Score Severity   1-4 No Depression   5-9 Mild Depression   10-14 Moderate Depression   15-19 Moderately Severe Depression   20-27 Severe Depression       Objective:     /70   Pulse 68   Temp 36.8 °C (98.3 °F) (Temporal)   Ht 1.689 m (5' 6.5\")   Wt 88.8 kg (195 lb 12.8 oz)   SpO2 95%  Body mass index is 31.13 kg/m².     Physical Exam:  Constitutional: Oriented to person, place, and time and well-developed, well-nourished, and in no distress.   HENT:   Head: Normocephalic and atraumatic.   Eyes: Conjunctivae and EOM are normal. Pupils are equal, round, and reactive to light.   Neck: Normal range of motion. Neck supple.   Cardiovascular: Normal rate, regular rhythm, normal heart sounds.Exam reveals no friction rub. No murmur heard.  Pulmonary/Chest: Effort normal and breath sounds normal. No respiratory distress or use of accessory muscles. No wheezes, rhonchi, or rales.   Neurological: Alert and oriented to person, place, and time. Gait normal.   Skin: Skin is warm and dry. No cyanosis. No edema.  Psychiatric: She is tearful during today's visit. Memory, affect and judgment normal.       Assessment and Plan:   The following treatment plan was discussed    1. Severe " episode of recurrent major depressive disorder, without psychotic features (HCC)  Uncontrolled. Increase Cymbalta to 60mg daily. Encouraged to continue grief counseling, as well as individual psychotherapy.  - DULoxetine (CYMBALTA) 30 MG Cap DR Particles; Take 2 Caps by mouth every day.  Dispense: 90 Cap; Refill: 1    2. Anxiety  Uncontrolled. Increase Cymbalta to 60mg daily.  - DULoxetine (CYMBALTA) 30 MG Cap DR Particles; Take 2 Caps by mouth every day.  Dispense: 90 Cap; Refill: 1    3. Hypothyroidism due to acquired atrophy of thyroid  Labs ordered to evaluate thyroid levels.  - TSH; Future  - FREE THYROXINE; Future    4. Annual physical exam  Routine screening labs ordered.  - Comp Metabolic Panel; Future  - HEMOGLOBIN A1C; Future  - Lipid Profile; Future  - TSH; Future  - FREE THYROXINE; Future  - VITAMIN D,25 HYDROXY; Future  - CBC WITH DIFFERENTIAL; Future      Followup: Return in about 1 month (around 2/29/2020) for For follow-up on, Depression/Anxiety.

## 2020-01-29 NOTE — ASSESSMENT & PLAN NOTE
This is a chronic issue. She reports worsening of symptoms over the past few months. Her symptoms include fatigue, intense emotion, difficulties concentrating, and excessive worry. She denies suicidal or homicidal ideation. Her symptoms are aggravated by son who is a heroin addict and divorce that occurred 4 years ago. She has been coping through exercise, spiritual practice, and grief counseling. She is currently taking Cymbalta 30mg daily which she has been taking for several years.

## 2020-03-04 ENCOUNTER — APPOINTMENT (RX ONLY)
Dept: URBAN - METROPOLITAN AREA CLINIC 31 | Facility: CLINIC | Age: 50
Setting detail: DERMATOLOGY
End: 2020-03-04

## 2020-03-04 DIAGNOSIS — D18.0 HEMANGIOMA: ICD-10-CM

## 2020-03-04 DIAGNOSIS — D22 MELANOCYTIC NEVI: ICD-10-CM

## 2020-03-04 DIAGNOSIS — L663 OTHER SPECIFIED DISEASES OF HAIR AND HAIR FOLLICLES: ICD-10-CM

## 2020-03-04 DIAGNOSIS — L82.1 OTHER SEBORRHEIC KERATOSIS: ICD-10-CM

## 2020-03-04 DIAGNOSIS — L71.8 OTHER ROSACEA: ICD-10-CM

## 2020-03-04 DIAGNOSIS — L81.4 OTHER MELANIN HYPERPIGMENTATION: ICD-10-CM

## 2020-03-04 DIAGNOSIS — L73.9 FOLLICULAR DISORDER, UNSPECIFIED: ICD-10-CM

## 2020-03-04 DIAGNOSIS — L738 OTHER SPECIFIED DISEASES OF HAIR AND HAIR FOLLICLES: ICD-10-CM

## 2020-03-04 PROBLEM — L02.32 FURUNCLE OF BUTTOCK: Status: ACTIVE | Noted: 2020-03-04

## 2020-03-04 PROBLEM — D22.71 MELANOCYTIC NEVI OF RIGHT LOWER LIMB, INCLUDING HIP: Status: ACTIVE | Noted: 2020-03-04

## 2020-03-04 PROBLEM — D22.72 MELANOCYTIC NEVI OF LEFT LOWER LIMB, INCLUDING HIP: Status: ACTIVE | Noted: 2020-03-04

## 2020-03-04 PROBLEM — L02.426 FURUNCLE OF LEFT LOWER LIMB: Status: ACTIVE | Noted: 2020-03-04

## 2020-03-04 PROBLEM — D18.01 HEMANGIOMA OF SKIN AND SUBCUTANEOUS TISSUE: Status: ACTIVE | Noted: 2020-03-04

## 2020-03-04 PROBLEM — D22.5 MELANOCYTIC NEVI OF TRUNK: Status: ACTIVE | Noted: 2020-03-04

## 2020-03-04 PROBLEM — D22.62 MELANOCYTIC NEVI OF LEFT UPPER LIMB, INCLUDING SHOULDER: Status: ACTIVE | Noted: 2020-03-04

## 2020-03-04 PROBLEM — D22.61 MELANOCYTIC NEVI OF RIGHT UPPER LIMB, INCLUDING SHOULDER: Status: ACTIVE | Noted: 2020-03-04

## 2020-03-04 PROCEDURE — ? COUNSELING

## 2020-03-04 PROCEDURE — ? PRESCRIPTION

## 2020-03-04 PROCEDURE — 99396 PREV VISIT EST AGE 40-64: CPT

## 2020-03-04 PROCEDURE — ? TREATMENT REGIMEN

## 2020-03-04 RX ORDER — HYDROCORTISONE 25 MG/G
CREAM TOPICAL BID
Qty: 1 | Refills: 3 | Status: ERX | COMMUNITY
Start: 2020-03-04

## 2020-03-04 RX ORDER — CLINDAMYCIN PHOSPHATE 10 MG/ML
LOTION TOPICAL BID
Qty: 1 | Refills: 4 | Status: ERX | COMMUNITY
Start: 2020-03-04

## 2020-03-04 RX ORDER — METRONIDAZOLE 7.5 MG/G
CREAM TOPICAL BID
Qty: 1 | Refills: 3 | Status: ERX | COMMUNITY
Start: 2020-03-04

## 2020-03-04 RX ADMIN — HYDROCORTISONE: 25 CREAM TOPICAL at 00:00

## 2020-03-04 RX ADMIN — METRONIDAZOLE: 7.5 CREAM TOPICAL at 00:00

## 2020-03-04 RX ADMIN — CLINDAMYCIN PHOSPHATE: 10 LOTION TOPICAL at 00:00

## 2020-03-04 ASSESSMENT — LOCATION DETAILED DESCRIPTION DERM
LOCATION DETAILED: LEFT POPLITEAL SKIN
LOCATION DETAILED: LEFT ANTERIOR PROXIMAL UPPER ARM
LOCATION DETAILED: RIGHT ULNAR DORSAL HAND
LOCATION DETAILED: LEFT ANTERIOR DISTAL THIGH
LOCATION DETAILED: PERIUMBILICAL SKIN
LOCATION DETAILED: RIGHT MEDIAL BUTTOCK
LOCATION DETAILED: RIGHT ANTERIOR PROXIMAL THIGH
LOCATION DETAILED: LEFT PROXIMAL POSTERIOR UPPER ARM
LOCATION DETAILED: NASAL DORSUM
LOCATION DETAILED: LEFT ANTECUBITAL SKIN
LOCATION DETAILED: LEFT ULNAR DORSAL HAND
LOCATION DETAILED: RIGHT KNEE
LOCATION DETAILED: RIGHT MEDIAL UPPER BACK
LOCATION DETAILED: LEFT PROXIMAL DORSAL FOREARM
LOCATION DETAILED: RIGHT PROXIMAL DORSAL FOREARM
LOCATION DETAILED: RIGHT CENTRAL MALAR CHEEK
LOCATION DETAILED: UPPER STERNUM
LOCATION DETAILED: RIGHT ANTERIOR DISTAL THIGH
LOCATION DETAILED: RIGHT ANTERIOR LATERAL PROXIMAL UPPER ARM
LOCATION DETAILED: LEFT INFERIOR MEDIAL LOWER BACK
LOCATION DETAILED: LEFT ELBOW
LOCATION DETAILED: MIDDLE STERNUM
LOCATION DETAILED: RIGHT ELBOW
LOCATION DETAILED: RIGHT PROXIMAL POSTERIOR UPPER ARM
LOCATION DETAILED: LEFT CENTRAL MALAR CHEEK
LOCATION DETAILED: LEFT DISTAL POSTERIOR THIGH
LOCATION DETAILED: RIGHT POPLITEAL SKIN
LOCATION DETAILED: SUPERIOR THORACIC SPINE
LOCATION DETAILED: LEFT MEDIAL TRAPEZIAL NECK
LOCATION DETAILED: LEFT BUTTOCK
LOCATION DETAILED: LEFT PROXIMAL POSTERIOR THIGH
LOCATION DETAILED: LEFT KNEE
LOCATION DETAILED: INFERIOR LUMBAR SPINE
LOCATION DETAILED: RIGHT BUTTOCK
LOCATION DETAILED: RIGHT DISTAL POSTERIOR THIGH
LOCATION DETAILED: RIGHT ANTECUBITAL SKIN

## 2020-03-04 ASSESSMENT — LOCATION SIMPLE DESCRIPTION DERM
LOCATION SIMPLE: RIGHT KNEE
LOCATION SIMPLE: LOWER BACK
LOCATION SIMPLE: RIGHT BUTTOCK
LOCATION SIMPLE: UPPER BACK
LOCATION SIMPLE: LEFT ELBOW
LOCATION SIMPLE: RIGHT UPPER ARM
LOCATION SIMPLE: LEFT UPPER ARM
LOCATION SIMPLE: RIGHT ELBOW
LOCATION SIMPLE: POSTERIOR NECK
LOCATION SIMPLE: ABDOMEN
LOCATION SIMPLE: LEFT KNEE
LOCATION SIMPLE: LEFT POSTERIOR THIGH
LOCATION SIMPLE: LEFT THIGH
LOCATION SIMPLE: CHEST
LOCATION SIMPLE: LEFT LOWER BACK
LOCATION SIMPLE: RIGHT POSTERIOR UPPER ARM
LOCATION SIMPLE: LEFT BUTTOCK
LOCATION SIMPLE: LEFT HAND
LOCATION SIMPLE: LEFT POSTERIOR UPPER ARM
LOCATION SIMPLE: RIGHT HAND
LOCATION SIMPLE: RIGHT FOREARM
LOCATION SIMPLE: LEFT FOREARM
LOCATION SIMPLE: RIGHT POSTERIOR THIGH
LOCATION SIMPLE: LEFT POPLITEAL SKIN
LOCATION SIMPLE: NOSE
LOCATION SIMPLE: RIGHT POPLITEAL SKIN
LOCATION SIMPLE: RIGHT UPPER BACK
LOCATION SIMPLE: RIGHT THIGH
LOCATION SIMPLE: RIGHT CHEEK
LOCATION SIMPLE: LEFT CHEEK

## 2020-03-04 ASSESSMENT — LOCATION ZONE DERM
LOCATION ZONE: LEG
LOCATION ZONE: ARM
LOCATION ZONE: TRUNK
LOCATION ZONE: FACE
LOCATION ZONE: HAND
LOCATION ZONE: NOSE
LOCATION ZONE: NECK

## 2020-03-04 NOTE — PROCEDURE: REASSURANCE
Hide Include Location In Plan Question?: No
Detail Level: Zone
Additional Note: Includes lesion of concern noted on intake.\\nConsider Bx in event of enlarging, bleeding, or worsening hemangioma.

## 2020-03-04 NOTE — PROCEDURE: TREATMENT REGIMEN
Detail Level: Zone
Initiate Treatment: Clindamycin lotion daily.\\Jarrod event of flare, combine hydrocortisone with clindamycin for 2 wks.
Initiate Treatment: Metronidazole BID.\\nGentle skin care regimen.\\nPt declines consideration of oral regimen at this time.\\nF/u in event of persistent or worsening sx.

## 2020-12-10 ENCOUNTER — TELEMEDICINE (OUTPATIENT)
Dept: MEDICAL GROUP | Facility: PHYSICIAN GROUP | Age: 50
End: 2020-12-10
Payer: COMMERCIAL

## 2020-12-10 VITALS — HEIGHT: 67 IN | WEIGHT: 190 LBS | BODY MASS INDEX: 29.82 KG/M2

## 2020-12-10 DIAGNOSIS — Z00.00 ANNUAL PHYSICAL EXAM: ICD-10-CM

## 2020-12-10 DIAGNOSIS — Z76.0 MEDICATION REFILL: ICD-10-CM

## 2020-12-10 DIAGNOSIS — E03.4 HYPOTHYROIDISM DUE TO ACQUIRED ATROPHY OF THYROID: ICD-10-CM

## 2020-12-10 DIAGNOSIS — F33.2 SEVERE EPISODE OF RECURRENT MAJOR DEPRESSIVE DISORDER, WITHOUT PSYCHOTIC FEATURES (HCC): ICD-10-CM

## 2020-12-10 DIAGNOSIS — F41.9 ANXIETY: ICD-10-CM

## 2020-12-10 PROCEDURE — 99214 OFFICE O/P EST MOD 30 MIN: CPT | Mod: 95,CR | Performed by: NURSE PRACTITIONER

## 2020-12-10 RX ORDER — DULOXETIN HYDROCHLORIDE 30 MG/1
30 CAPSULE, DELAYED RELEASE ORAL 2 TIMES DAILY
Qty: 180 CAP | Refills: 3 | Status: SHIPPED | OUTPATIENT
Start: 2020-12-10 | End: 2021-05-14

## 2020-12-10 SDOH — HEALTH STABILITY: MENTAL HEALTH: HOW OFTEN DO YOU HAVE A DRINK CONTAINING ALCOHOL?: 2-4 TIMES A MONTH

## 2020-12-10 NOTE — PROGRESS NOTES
Telemedicine Visit: Established Patient     This encounter was conducted via Zoom.   Verbal consent was obtained. Patient's identity was verified.    Subjective:     Chief Complaint   Patient presents with   • Medication Refill     Leigh Whitley is a 50 y.o. female presenting for evaluation and management of following problems:    Anxiety  New to me. Previously well controlled on Cymbalta 30 mg BID. Has been off for a week and feels anxious. Requiring refills today. Denies SI, HI, or depression.       ROS   Denies any recent fevers or chills. No nausea or vomiting. No chest pains or shortness of breath.     Allergies   Allergen Reactions   • Ultram [Tramadol Hcl] Itching       Current medicines (including changes today)  Current Outpatient Medications   Medication Sig Dispense Refill   • DULoxetine (CYMBALTA) 30 MG Cap DR Particles Take 1 Cap by mouth 2 times a day. 180 Cap 3   • levothyroxine (SYNTHROID) 150 MCG Tab TAKE 1 TABLET BY MOUTH IN THE MORNING ON  AN  EMPTY  STOMACH 90 Tab 1     No current facility-administered medications for this visit.        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  She  has a past surgical history that includes hysterectomy, total abdominal (2012); cholecystectomy (2002); appendectomy (2012); gastric banding laparoscopic (2008); tubal  "ligation; and laminotomy.       Objective:   Vitals obtained by patient:  Ht 1.689 m (5' 6.5\")   Wt 86.2 kg (190 lb)   BMI 30.21 kg/m²      Physical Exam:  General: No acute distress. Well nourished.   HEENT: EOM grossly intact, no scleral icterus, no pharyngeal erythema.   Neck:  No JVD noted at 90 degrees, trachea midline  CVS: Pulse as reported by patient, no visible LE edema.  Resp: Unlabored respiratory effort, no cough or audible wheeze  MSK/Ext: No clubbing or cyanosis visible appreciated.  Skin: No rashes in visible areas.  Neurological: AOx3. CN III-XII grossly intact. No focal deficits.     LABS: 2017  results reviewed and discussed with the patient, questions answered.    Patient was seen for 25 minutes face to face of which > 50% of appointment time was spent on counseling and coordination of care regarding the above.   Assessment and Plan:   1. Anxiety  Stable on current regimen, continue. Refills given. Discussed the importance of thyroid monitoring, labs ordered. Pt agrees with plan. Will have labs completed prior to establish care appt on 12/17.  - DULoxetine (CYMBALTA) 30 MG Cap DR Particles; Take 1 Cap by mouth 2 times a day.  Dispense: 180 Cap; Refill: 3    2. Severe episode of recurrent major depressive disorder, without psychotic features ([facility])  As discussed in #1  - DULoxetine (CYMBALTA) 30 MG Cap DR Particles; Take 1 Cap by mouth 2 times a day.  Dispense: 180 Cap; Refill: 3    3. Annual physical exam  - TSH; Future  - CBC WITH DIFFERENTIAL; Future  - Comp Metabolic Panel; Future  - FREE THYROXINE; Future  - VITAMIN D,25 HYDROXY; Future  - Lipid Profile; Future    4. Hypothyroidism due to acquired atrophy of thyroid  Will check labs and discuss w/ pt at the upcoming appt  - TSH; Future  - FREE THYROXINE; Future    5. Medication refill  Refills given       Follow-up: Return if symptoms worsen or fail to improve.          "

## 2020-12-10 NOTE — ASSESSMENT & PLAN NOTE
New to me. Previously well controlled on Cymbalta 30 mg BID. Has been off for a week and feels anxious. Requiring refills today. Denies SI, HI, or depression. Supportive systems in place. Pt aware of contact numbers in case of crisis.

## 2020-12-14 ENCOUNTER — HOSPITAL ENCOUNTER (OUTPATIENT)
Dept: LAB | Facility: MEDICAL CENTER | Age: 50
End: 2020-12-14
Attending: NURSE PRACTITIONER
Payer: COMMERCIAL

## 2020-12-14 DIAGNOSIS — Z00.00 ANNUAL PHYSICAL EXAM: ICD-10-CM

## 2020-12-14 DIAGNOSIS — E03.4 HYPOTHYROIDISM DUE TO ACQUIRED ATROPHY OF THYROID: ICD-10-CM

## 2020-12-14 LAB
25(OH)D3 SERPL-MCNC: 50 NG/ML (ref 30–100)
ALBUMIN SERPL BCP-MCNC: 4.2 G/DL (ref 3.2–4.9)
ALBUMIN/GLOB SERPL: 1.5 G/DL
ALP SERPL-CCNC: 88 U/L (ref 30–99)
ALT SERPL-CCNC: 22 U/L (ref 2–50)
ANION GAP SERPL CALC-SCNC: 9 MMOL/L (ref 7–16)
AST SERPL-CCNC: 22 U/L (ref 12–45)
BASOPHILS # BLD AUTO: 1 % (ref 0–1.8)
BASOPHILS # BLD: 0.05 K/UL (ref 0–0.12)
BILIRUB SERPL-MCNC: 0.5 MG/DL (ref 0.1–1.5)
BUN SERPL-MCNC: 19 MG/DL (ref 8–22)
CALCIUM SERPL-MCNC: 9.3 MG/DL (ref 8.5–10.5)
CHLORIDE SERPL-SCNC: 102 MMOL/L (ref 96–112)
CHOLEST SERPL-MCNC: 236 MG/DL (ref 100–199)
CO2 SERPL-SCNC: 27 MMOL/L (ref 20–33)
CREAT SERPL-MCNC: 0.89 MG/DL (ref 0.5–1.4)
EOSINOPHIL # BLD AUTO: 0.1 K/UL (ref 0–0.51)
EOSINOPHIL NFR BLD: 2 % (ref 0–6.9)
ERYTHROCYTE [DISTWIDTH] IN BLOOD BY AUTOMATED COUNT: 42.1 FL (ref 35.9–50)
FASTING STATUS PATIENT QL REPORTED: NORMAL
GLOBULIN SER CALC-MCNC: 2.8 G/DL (ref 1.9–3.5)
GLUCOSE SERPL-MCNC: 86 MG/DL (ref 65–99)
HCT VFR BLD AUTO: 41.9 % (ref 37–47)
HDLC SERPL-MCNC: 46 MG/DL
HGB BLD-MCNC: 13.9 G/DL (ref 12–16)
IMM GRANULOCYTES # BLD AUTO: 0.01 K/UL (ref 0–0.11)
IMM GRANULOCYTES NFR BLD AUTO: 0.2 % (ref 0–0.9)
LDLC SERPL CALC-MCNC: 171 MG/DL
LYMPHOCYTES # BLD AUTO: 2.02 K/UL (ref 1–4.8)
LYMPHOCYTES NFR BLD: 39.7 % (ref 22–41)
MCH RBC QN AUTO: 32.4 PG (ref 27–33)
MCHC RBC AUTO-ENTMCNC: 33.2 G/DL (ref 33.6–35)
MCV RBC AUTO: 97.7 FL (ref 81.4–97.8)
MONOCYTES # BLD AUTO: 0.41 K/UL (ref 0–0.85)
MONOCYTES NFR BLD AUTO: 8.1 % (ref 0–13.4)
NEUTROPHILS # BLD AUTO: 2.5 K/UL (ref 2–7.15)
NEUTROPHILS NFR BLD: 49 % (ref 44–72)
NRBC # BLD AUTO: 0 K/UL
NRBC BLD-RTO: 0 /100 WBC
PLATELET # BLD AUTO: 226 K/UL (ref 164–446)
PMV BLD AUTO: 11.1 FL (ref 9–12.9)
POTASSIUM SERPL-SCNC: 3.7 MMOL/L (ref 3.6–5.5)
PROT SERPL-MCNC: 7 G/DL (ref 6–8.2)
RBC # BLD AUTO: 4.29 M/UL (ref 4.2–5.4)
SODIUM SERPL-SCNC: 138 MMOL/L (ref 135–145)
T4 FREE SERPL-MCNC: 1.03 NG/DL (ref 0.93–1.7)
TRIGL SERPL-MCNC: 96 MG/DL (ref 0–149)
TSH SERPL DL<=0.005 MIU/L-ACNC: 2.22 UIU/ML (ref 0.38–5.33)
WBC # BLD AUTO: 5.1 K/UL (ref 4.8–10.8)

## 2020-12-14 PROCEDURE — 82306 VITAMIN D 25 HYDROXY: CPT

## 2020-12-14 PROCEDURE — 36415 COLL VENOUS BLD VENIPUNCTURE: CPT

## 2020-12-14 PROCEDURE — 80061 LIPID PANEL: CPT

## 2020-12-14 PROCEDURE — 80053 COMPREHEN METABOLIC PANEL: CPT

## 2020-12-14 PROCEDURE — 84443 ASSAY THYROID STIM HORMONE: CPT

## 2020-12-14 PROCEDURE — 84439 ASSAY OF FREE THYROXINE: CPT

## 2020-12-14 PROCEDURE — 85025 COMPLETE CBC W/AUTO DIFF WBC: CPT

## 2021-03-08 ENCOUNTER — APPOINTMENT (RX ONLY)
Dept: URBAN - METROPOLITAN AREA CLINIC 31 | Facility: CLINIC | Age: 51
Setting detail: DERMATOLOGY
End: 2021-03-08

## 2021-03-08 DIAGNOSIS — D18.0 HEMANGIOMA: ICD-10-CM

## 2021-03-08 DIAGNOSIS — L82.1 OTHER SEBORRHEIC KERATOSIS: ICD-10-CM

## 2021-03-08 DIAGNOSIS — D22 MELANOCYTIC NEVI: ICD-10-CM

## 2021-03-08 DIAGNOSIS — Z41.9 ENCOUNTER FOR PROCEDURE FOR PURPOSES OTHER THAN REMEDYING HEALTH STATE, UNSPECIFIED: ICD-10-CM

## 2021-03-08 DIAGNOSIS — L81.4 OTHER MELANIN HYPERPIGMENTATION: ICD-10-CM

## 2021-03-08 DIAGNOSIS — L71.8 OTHER ROSACEA: ICD-10-CM | Status: WELL CONTROLLED

## 2021-03-08 DIAGNOSIS — Z71.89 OTHER SPECIFIED COUNSELING: ICD-10-CM

## 2021-03-08 PROBLEM — D22.62 MELANOCYTIC NEVI OF LEFT UPPER LIMB, INCLUDING SHOULDER: Status: ACTIVE | Noted: 2021-03-08

## 2021-03-08 PROBLEM — D18.01 HEMANGIOMA OF SKIN AND SUBCUTANEOUS TISSUE: Status: ACTIVE | Noted: 2021-03-08

## 2021-03-08 PROBLEM — D22.61 MELANOCYTIC NEVI OF RIGHT UPPER LIMB, INCLUDING SHOULDER: Status: ACTIVE | Noted: 2021-03-08

## 2021-03-08 PROBLEM — D22.5 MELANOCYTIC NEVI OF TRUNK: Status: ACTIVE | Noted: 2021-03-08

## 2021-03-08 PROBLEM — D22.72 MELANOCYTIC NEVI OF LEFT LOWER LIMB, INCLUDING HIP: Status: ACTIVE | Noted: 2021-03-08

## 2021-03-08 PROBLEM — D22.71 MELANOCYTIC NEVI OF RIGHT LOWER LIMB, INCLUDING HIP: Status: ACTIVE | Noted: 2021-03-08

## 2021-03-08 PROCEDURE — 99396 PREV VISIT EST AGE 40-64: CPT

## 2021-03-08 PROCEDURE — ? ADDITIONAL NOTES

## 2021-03-08 PROCEDURE — ? COUNSELING

## 2021-03-08 ASSESSMENT — LOCATION SIMPLE DESCRIPTION DERM
LOCATION SIMPLE: RIGHT ELBOW
LOCATION SIMPLE: RIGHT HAND
LOCATION SIMPLE: LEFT KNEE
LOCATION SIMPLE: RIGHT UPPER ARM
LOCATION SIMPLE: RIGHT SHOULDER
LOCATION SIMPLE: RIGHT POSTERIOR THIGH
LOCATION SIMPLE: LEFT POPLITEAL SKIN
LOCATION SIMPLE: RIGHT KNEE
LOCATION SIMPLE: RIGHT EYEBROW
LOCATION SIMPLE: LEFT LOWER BACK
LOCATION SIMPLE: LEFT HAND
LOCATION SIMPLE: LEFT POSTERIOR THIGH
LOCATION SIMPLE: RIGHT CHEEK
LOCATION SIMPLE: RIGHT THIGH
LOCATION SIMPLE: RIGHT POPLITEAL SKIN
LOCATION SIMPLE: LEFT UPPER BACK
LOCATION SIMPLE: LEFT THIGH
LOCATION SIMPLE: LEFT UPPER ARM
LOCATION SIMPLE: RIGHT UPPER BACK
LOCATION SIMPLE: LEFT CHEEK
LOCATION SIMPLE: ABDOMEN
LOCATION SIMPLE: LEFT ELBOW
LOCATION SIMPLE: RIGHT FOREARM
LOCATION SIMPLE: LEFT EYEBROW
LOCATION SIMPLE: LEFT FOREARM

## 2021-03-08 ASSESSMENT — LOCATION DETAILED DESCRIPTION DERM
LOCATION DETAILED: RIGHT ELBOW
LOCATION DETAILED: LEFT ANTERIOR DISTAL THIGH
LOCATION DETAILED: LEFT LATERAL EYEBROW
LOCATION DETAILED: LEFT PROXIMAL DORSAL FOREARM
LOCATION DETAILED: 2ND WEB SPACE RIGHT HAND
LOCATION DETAILED: LEFT KNEE
LOCATION DETAILED: RIGHT ANTERIOR DISTAL THIGH
LOCATION DETAILED: LEFT CENTRAL MALAR CHEEK
LOCATION DETAILED: RIGHT POSTERIOR SHOULDER
LOCATION DETAILED: LEFT SUPERIOR LATERAL UPPER BACK
LOCATION DETAILED: RIGHT PROXIMAL DORSAL FOREARM
LOCATION DETAILED: RIGHT CENTRAL MALAR CHEEK
LOCATION DETAILED: RIGHT VENTRAL DISTAL FOREARM
LOCATION DETAILED: LEFT ANTECUBITAL SKIN
LOCATION DETAILED: RIGHT DISTAL POSTERIOR THIGH
LOCATION DETAILED: LEFT ULNAR DORSAL HAND
LOCATION DETAILED: PERIUMBILICAL SKIN
LOCATION DETAILED: RIGHT MEDIAL UPPER BACK
LOCATION DETAILED: RIGHT POPLITEAL SKIN
LOCATION DETAILED: LEFT INFERIOR CENTRAL MALAR CHEEK
LOCATION DETAILED: RIGHT ANTECUBITAL SKIN
LOCATION DETAILED: RIGHT SUPERIOR CENTRAL MALAR CHEEK
LOCATION DETAILED: LEFT VENTRAL DISTAL FOREARM
LOCATION DETAILED: LEFT SUPERIOR CENTRAL MALAR CHEEK
LOCATION DETAILED: LEFT POPLITEAL SKIN
LOCATION DETAILED: RIGHT KNEE
LOCATION DETAILED: LEFT DISTAL POSTERIOR THIGH
LOCATION DETAILED: RIGHT INFERIOR CENTRAL MALAR CHEEK
LOCATION DETAILED: RIGHT INFERIOR UPPER BACK
LOCATION DETAILED: RIGHT LATERAL EYEBROW
LOCATION DETAILED: LEFT SUPERIOR MEDIAL MIDBACK
LOCATION DETAILED: LEFT ELBOW
LOCATION DETAILED: EPIGASTRIC SKIN

## 2021-03-08 ASSESSMENT — LOCATION ZONE DERM
LOCATION ZONE: LEG
LOCATION ZONE: TRUNK
LOCATION ZONE: FACE
LOCATION ZONE: ARM
LOCATION ZONE: HAND

## 2021-03-08 NOTE — PROCEDURE: MIPS QUALITY
Quality 226: Preventive Care And Screening: Tobacco Use: Screening And Cessation Intervention: Patient screened for tobacco use and is an ex/non-smoker
Quality 130: Documentation Of Current Medications In The Medical Record: Current Medications Documented
Detail Level: Detailed
OB/GYN

## 2021-03-08 NOTE — PROCEDURE: ADDITIONAL NOTES
Additional Notes: Pt given info for [facility] office. Will schedule consult to discuss treatment options.
Detail Level: Simple
Render Risk Assessment In Note?: no
Additional Notes: Pt discontinued metronidazole but has been using topical CBD and feels rosacea is well controlled. She is consistent with gentle skin care regimen but states she can improve her sun protection.

## 2021-05-12 ENCOUNTER — NURSE TRIAGE (OUTPATIENT)
Dept: HEALTH INFORMATION MANAGEMENT | Facility: OTHER | Age: 51
End: 2021-05-12

## 2021-05-12 NOTE — TELEPHONE ENCOUNTER
Regarding: PT THINKS SHE MAY HAVE A STROKE APPROX 2 MONTHS AGO SHE AND HER DAUGHTER NOTICE HER FACE IS DROPPED   ----- Message from Jayesh Browne sent at 5/12/2021 10:03 AM PDT -----  PT THINKS SHE MAY HAVE A STROKE APPROX 2 MONTHS AGO SHE AND HER DAUGHTER NOTICE HER FACE IS DROPPED

## 2021-05-12 NOTE — TELEPHONE ENCOUNTER
"Pt believes she may have had a stroke 2 months ago. Pt has tingling intermittently on LEFT side and LEFT side facial drooping. Scheduled appt for Friday based on pt's availability for New Patient appt.    Reason for Disposition  • Neurologic deficit that was brief (now gone), ANY of the following: * Weakness of the face, arm, or leg on one side of the body * Numbness of the face, arm, or leg on one side of the body * Loss of speech or garbled speech    Additional Information  • Negative: Difficult to awaken or acting confused (e.g., disoriented, slurred speech)  • Negative: New neurologic deficit that is present NOW, sudden onset of ANY of the following: * Weakness of the face, arm, or leg on one side of the body * Numbness of the face, arm, or leg on one side of the body * Loss of speech or garbled speech  • Negative: Sounds like a life-threatening emergency to the triager  • Negative: Confusion, disorientation, or hallucinations is the main symptom  • Negative: Dizziness is the main symptom  • Negative: Followed a head injury within last 3 days  • Negative: Headache (with neurologic deficit)  • Negative: Unable to urinate (or only a few drops) and bladder feels very full  • Negative: Loss of control of bowel or bladder (i.e., incontinence) of new onset  • Negative: Back pain with numbness (loss of sensation) in groin or rectal area  • Negative: Patient sounds very sick or weak to the triager    Answer Assessment - Initial Assessment Questions  1. SYMPTOM: \"What is the main symptom you are concerned about?\" (e.g., weakness, numbness)      Arm numbness, facial droop LEFT side  2. ONSET: \"When did this start?\" (minutes, hours, days; while sleeping)      A couple of months ago  3. LAST NORMAL: \"When was the last time you were normal (no symptoms)?\"      A couple of months ago  4. PATTERN \"Does this come and go, or has it been constant since it started?\"  \"Is it present now?\"      Started to feel better, facial droop " "LEFT face still there, tingling comes and goes  5. CARDIAC SYMPTOMS: \"Have you had any of the following symptoms: chest pain, difficulty breathing, palpitations?\"      no  6. NEUROLOGIC SYMPTOMS: \"Have you had any of the following symptoms: headache, dizziness, vision loss, double vision, changes in speech, unsteady on your feet?\"      Normal now  7. OTHER SYMPTOMS: \"Do you have any other symptoms?\"      no  8. PREGNANCY: \"Is there any chance you are pregnant?\" \"When was your last menstrual period?\"      no    Protocols used: NEUROLOGIC DEFICIT-A-[place]      "

## 2021-05-14 ENCOUNTER — OFFICE VISIT (OUTPATIENT)
Dept: MEDICAL GROUP | Facility: PHYSICIAN GROUP | Age: 51
End: 2021-05-14
Payer: COMMERCIAL

## 2021-05-14 VITALS
SYSTOLIC BLOOD PRESSURE: 110 MMHG | WEIGHT: 199.6 LBS | BODY MASS INDEX: 31.33 KG/M2 | DIASTOLIC BLOOD PRESSURE: 70 MMHG | HEIGHT: 67 IN | TEMPERATURE: 98.5 F | RESPIRATION RATE: 12 BRPM | OXYGEN SATURATION: 95 % | HEART RATE: 71 BPM

## 2021-05-14 DIAGNOSIS — E03.4 HYPOTHYROIDISM DUE TO ACQUIRED ATROPHY OF THYROID: ICD-10-CM

## 2021-05-14 DIAGNOSIS — E78.5 DYSLIPIDEMIA: ICD-10-CM

## 2021-05-14 DIAGNOSIS — F41.9 ANXIETY: ICD-10-CM

## 2021-05-14 DIAGNOSIS — Z86.73 HISTORY OF STROKE: ICD-10-CM

## 2021-05-14 DIAGNOSIS — F41.0 PANIC ATTACKS: ICD-10-CM

## 2021-05-14 DIAGNOSIS — R06.83 SNORING: ICD-10-CM

## 2021-05-14 PROCEDURE — 99215 OFFICE O/P EST HI 40 MIN: CPT | Performed by: NURSE PRACTITIONER

## 2021-05-14 RX ORDER — CITALOPRAM 20 MG/1
20 TABLET ORAL DAILY
Qty: 60 TABLET | Refills: 1 | Status: SHIPPED | OUTPATIENT
Start: 2021-05-14 | End: 2021-07-01 | Stop reason: SDUPTHER

## 2021-05-14 ASSESSMENT — PATIENT HEALTH QUESTIONNAIRE - PHQ9
6. FEELING BAD ABOUT YOURSELF - OR THAT YOU ARE A FAILURE OR HAVE LET YOURSELF OR YOUR FAMILY DOWN: NOT AL ALL
8. MOVING OR SPEAKING SO SLOWLY THAT OTHER PEOPLE COULD HAVE NOTICED. OR THE OPPOSITE, BEING SO FIGETY OR RESTLESS THAT YOU HAVE BEEN MOVING AROUND A LOT MORE THAN USUAL: NEARLY EVERY DAY
7. TROUBLE CONCENTRATING ON THINGS, SUCH AS READING THE NEWSPAPER OR WATCHING TELEVISION: NEARLY EVERY DAY
1. LITTLE INTEREST OR PLEASURE IN DOING THINGS: NOT AT ALL
9. THOUGHTS THAT YOU WOULD BE BETTER OFF DEAD, OR OF HURTING YOURSELF: NOT AT ALL
4. FEELING TIRED OR HAVING LITTLE ENERGY: NOT AT ALL
SUM OF ALL RESPONSES TO PHQ9 QUESTIONS 1 AND 2: 0
5. POOR APPETITE OR OVEREATING: NOT AT ALL
2. FEELING DOWN, DEPRESSED, IRRITABLE, OR HOPELESS: NOT AT ALL
SUM OF ALL RESPONSES TO PHQ QUESTIONS 1-9: 6
3. TROUBLE FALLING OR STAYING ASLEEP OR SLEEPING TOO MUCH: NOT AT ALL

## 2021-05-14 ASSESSMENT — FIBROSIS 4 INDEX: FIB4 SCORE: 1.02

## 2021-05-14 NOTE — ASSESSMENT & PLAN NOTE
History of stroke x3 yrs, patient was diagnosed by ophthalmology with vision deficit in right eye.  Now reports blurry vision in right eye.  Needs a referral to neurology today.

## 2021-05-14 NOTE — ASSESSMENT & PLAN NOTE
Results for CHERRI SHAH (MRN [id number]) as of 5/14/2021 14:30   Ref. Range 12/14/2020 06:31   Cholesterol,Tot Latest Ref Range: 100 - 199 mg/dL 236 (H)   Triglycerides Latest Ref Range: 0 - 149 mg/dL 96   HDL Latest Ref Range: >=40 mg/dL 46   LDL Latest Ref Range: <100 mg/dL 171 (H)   This is a chronic problem.  She denies CP, dyspnea, dizziness peripheral edema.

## 2021-05-14 NOTE — PROGRESS NOTES
Chief Complaint   Patient presents with   • Establish Care       HISTORY OF PRESENT ILLNESS: Patient is a 50 y.o. female, established patient who presents today to discuss medical problems as listed below:      History of stroke  History of stroke x3 yrs, patient was diagnosed by ophthalmology with vision deficit in right eye.  Now reports blurry vision in right eye.  Needs a referral to neurology today.    Panic attacks  Chronic intermittent condition.  Patient admits to panic attacks, rare.  Last thyroid panel from December is WNL, patient was on levothyroxine 150 mcg.  Patient states she was not consistent on taking her medication regularly.  She recently restarted back on levothyroxine 150 mcg which makes her feel very anxious.  Patient attended therapy in the past and found it beneficial.    Hypothyroidism  Thyroid levels in December WNL, on levothyroxine 150 micrograms.  Patient reports anxiety.  She stopped taking medication for few months and felt sluggish.  She admits to autoimmune component.    Dyslipidemia  Results for CHERRI SHAH (MRN [id number]) as of 5/14/2021 14:30   Ref. Range 12/14/2020 06:31   Cholesterol,Tot Latest Ref Range: 100 - 199 mg/dL 236 (H)   Triglycerides Latest Ref Range: 0 - 149 mg/dL 96   HDL Latest Ref Range: >=40 mg/dL 46   LDL Latest Ref Range: <100 mg/dL 171 (H)   This is a chronic problem.  She denies CP, dyspnea, dizziness peripheral edema.       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     • citalopram (CELEXA) 20 MG Tab Take 1 tablet by mouth every day. 60 tablet 1   • levothyroxine (SYNTHROID) 150 MCG Tab TAKE 1 TABLET BY MOUTH IN THE MORNING ON  AN  EMPTY  STOMACH 90 Tab 1     No current facility-administered medications for this visit.       Social History     Tobacco Use   • Smoking status: Never Smoker   • Smokeless tobacco: Never Used   Vaping Use   • Vaping Use: Never used   Substance Use Topics   • Alcohol use: Yes     Comment: occ   • Drug use: Yes     Types: Marijuana     Comment: Occasionally     Social History     Social History Narrative   • Not on file       Family History   Problem Relation Age of Onset   • Cancer Mother        Allergies, past medical history, past surgical history, family history, social history reviewed and updated.    Review of Systems:     - Constitutional: Negative for fever, chills, unexpected weight change, and fatigue/generalized weakness.     - HEENT: Negative for headaches, vision changes, hearing changes, ear pain, ear discharge, rhinorrhea, sinus congestion, sore throat, and neck pain.      - Respiratory: Negative for cough, sputum production, chest congestion, dyspnea, wheezing, and crackles.      - Cardiovascular: Negative for chest pain, palpitations, orthopnea, and bilateral lower extremity edema.     - Gastrointestinal: Negative for heartburn, nausea, vomiting, abdominal pain, hematochezia, melena, diarrhea, constipation, and greasy/foul-smelling stools.     - Genitourinary: Negative for dysuria, polyuria, hematuria, pyuria, urinary urgency, and urinary incontinence.    - Musculoskeletal: Negative for myalgias, back pain, and joint pain.     - Skin: Negative for rash, itching,  "cyanotic skin color change.     - Neurological: Negative for dizziness, tingling, tremors, focal sensory deficit, focal weakness and headaches.     - Endo/Heme/Allergies: Does not bruise/bleed easily.     - Psychiatric/Behavioral: Anxiety.  Negative for depression, suicidal/homicidal ideation and memory loss.      All other systems reviewed and are negative    Exam:    /70   Pulse 71   Temp 36.9 °C (98.5 °F) (Temporal)   Resp 12   Ht 1.689 m (5' 6.5\")   Wt 90.5 kg (199 lb 9.6 oz)   SpO2 95%   BMI 31.73 kg/m²  Body mass index is 31.73 kg/m².    Physical Exam:  Constitutional: Well-developed and well-nourished. Not diaphoretic. No distress.   Skin: Skin is warm and dry. No rash noted.  Head: Atraumatic without lesions.  Eyes: Conjunctivae and extraocular motions are normal. Pupils are equal, round, and reactive to light. No scleral icterus.   Ears:  External ears unremarkable. Tympanic membranes clear and intact.  Nose: Nares patent. Septum midline. Turbinates without erythema nor edema. No discharge.   Mouth/Throat: Dentition is normal. Tongue normal. Oropharynx is clear and moist. Posterior pharynx without erythema or exudates.  Neck: Supple, trachea midline. Normal range of motion. No thyromegaly present. No lymphadenopathy--cervical or supraclavicular.  Cardiovascular: Regular rate and rhythm, S1 and S2 without murmur, rubs, or gallops.    Chest: Effort normal. Clear to auscultation throughout. No adventitious sounds. No CVA tenderness.  Abdomen: Soft, non tender, and without distention. Active bowel sounds in all four quadrants. No rebound, guarding, masses or HSM.  : Negative for dysuria, polyuria, hematuria, pyuria, urinary urgency, and urinary incontinence.  Extremities: No cyanosis, clubbing, erythema, nor edema. Distal pulses intact and symmetric.   Musculoskeletal: All major joints AROM full in all directions without pain.  Neurological: Alert and oriented x 3. DTRs 2+/3 and symmetric. No " cranial nerve deficit. 5/5 myotomes. Sensation intact. Negative Rhomberg.  Psychiatric: Noted anxiety.    MA/nursing note and vitals reviewed.    LABS: December 2020 results reviewed and discussed with the patient, questions answered.    My total time spent caring for the patient on the day of the encounter was 40 minutes.   This does not include time spent on separately billable procedures/tests.     Assessment/Plan:  1. History of stroke  - REFERRAL TO NEUROLOGY  - AMB REFERRAL TO NEURO OPHTHALMOLOGY    2. Dyslipidemia  Uncontrolled.  Discussed etiology and potential side effects.  Recommend statin.  Patient declined statin at this time.  She would like to recheck her labs and discuss findings at the next appointment.  - Lipid Profile; Future    3. Panic attacks  Uncontrolled.  I think this is reasonable to discontinue Cymbalta as patient is taking it however every for 5 days.  Patient educated on the importance of taking SSRIs or SNRIs daily for effectiveness.  Patient was also educated on the importance of not to take any other medication in the same class to avoid serotonin syndrome.  We will start Celexa 20 mg, take nightly.  We will also monitor thyroid function as this may very well  contributing to her anxiety.  - citalopram (CELEXA) 20 MG Tab; Take 1 tablet by mouth every day.  Dispense: 60 tablet; Refill: 1    4. Hypothyroidism due to acquired atrophy of thyroid  We will recheck labs and discuss findings with patient.  - TSH; Future  - FREE THYROXINE; Future  - Lipid Profile; Future  - TSI; Future  - ANTITHYROGLOBULIN AB; Future  - THYROID PEROXIDASE  (TPO) AB; Future  - TSH; Future    5. Snoring  -Referral to sleep       Discussed with patient possible alternative diagnoses, pt is to take all medications as prescribed. If symptoms persist FU w/PCP, if symptoms worsen go to emergency room. If experiencing any side effects from prescribed medications reports to the office immediately or go to emergency  room.  Reviewed indication, dosage, usage and potential adverse effects of prescribed medications. Reviewed risks and benefits of treatment plan. Patient verbalizes understanding of all instruction and verbally agrees to plan.    No follow-ups on file. annual

## 2021-05-14 NOTE — ASSESSMENT & PLAN NOTE
Thyroid levels in December WNL, on levothyroxine 150 micrograms.  Patient reports anxiety.  She stopped taking medication for few months and felt sluggish.  She admits to autoimmune component.

## 2021-05-14 NOTE — ASSESSMENT & PLAN NOTE
Chronic intermittent condition.  Patient admits to panic attacks, rare.  Last thyroid panel from December is WNL, patient was on levothyroxine 150 mcg.  Patient states she was not consistent on taking her medication regularly.  She recently restarted back on levothyroxine 150 mcg which makes her feel very anxious.  Patient attended therapy in the past and found it beneficial.

## 2021-05-14 NOTE — ASSESSMENT & PLAN NOTE
Chronic problem.  Patient was taking Cymbalta every 4 to 5 days when she felt anxious.  She would like to get off it and restart Celexa.  She was previously on Celexa 20 mg and tolerated well.

## 2021-05-17 ENCOUNTER — HOSPITAL ENCOUNTER (OUTPATIENT)
Dept: LAB | Facility: MEDICAL CENTER | Age: 51
End: 2021-05-17
Attending: NURSE PRACTITIONER
Payer: COMMERCIAL

## 2021-05-17 DIAGNOSIS — E03.4 HYPOTHYROIDISM DUE TO ACQUIRED ATROPHY OF THYROID: ICD-10-CM

## 2021-05-17 DIAGNOSIS — E78.5 DYSLIPIDEMIA: ICD-10-CM

## 2021-05-17 LAB
CHOLEST SERPL-MCNC: 213 MG/DL (ref 100–199)
FASTING STATUS PATIENT QL REPORTED: NORMAL
HDLC SERPL-MCNC: 46 MG/DL
LDLC SERPL CALC-MCNC: 138 MG/DL
T4 FREE SERPL-MCNC: 1.07 NG/DL (ref 0.93–1.7)
TRIGL SERPL-MCNC: 143 MG/DL (ref 0–149)
TSH SERPL DL<=0.005 MIU/L-ACNC: 0.74 UIU/ML (ref 0.38–5.33)

## 2021-05-17 PROCEDURE — 84439 ASSAY OF FREE THYROXINE: CPT

## 2021-05-17 PROCEDURE — 80061 LIPID PANEL: CPT

## 2021-05-17 PROCEDURE — 86376 MICROSOMAL ANTIBODY EACH: CPT

## 2021-05-17 PROCEDURE — 84445 ASSAY OF TSI GLOBULIN: CPT

## 2021-05-17 PROCEDURE — 36415 COLL VENOUS BLD VENIPUNCTURE: CPT

## 2021-05-17 PROCEDURE — 84443 ASSAY THYROID STIM HORMONE: CPT

## 2021-05-17 PROCEDURE — 86800 THYROGLOBULIN ANTIBODY: CPT

## 2021-05-19 LAB
THYROGLOB AB SERPL-ACNC: <0.9 IU/ML (ref 0–4)
THYROPEROXIDASE AB SERPL-ACNC: <0.3 IU/ML (ref 0–9)

## 2021-05-20 LAB — TSI SER-ACNC: <0.1 IU/L

## 2021-05-25 ENCOUNTER — APPOINTMENT (OUTPATIENT)
Dept: MEDICAL GROUP | Facility: PHYSICIAN GROUP | Age: 51
End: 2021-05-25
Payer: COMMERCIAL

## 2021-06-24 DIAGNOSIS — E03.4 HYPOTHYROIDISM DUE TO ACQUIRED ATROPHY OF THYROID: ICD-10-CM

## 2021-06-24 RX ORDER — LEVOTHYROXINE SODIUM 0.15 MG/1
150 TABLET ORAL
Qty: 90 TABLET | Refills: 0 | Status: SHIPPED | OUTPATIENT
Start: 2021-06-24 | End: 2021-06-30 | Stop reason: SDUPTHER

## 2021-06-24 NOTE — TELEPHONE ENCOUNTER
Received request via: Pharmacy    Was the patient seen in the last year in this department? Yes    Does the patient have an active prescription (recently filled or refills available) for medication(s) requested? No     Labs current in chart

## 2021-06-30 ENCOUNTER — PATIENT MESSAGE (OUTPATIENT)
Dept: MEDICAL GROUP | Facility: PHYSICIAN GROUP | Age: 51
End: 2021-06-30

## 2021-06-30 DIAGNOSIS — E03.4 HYPOTHYROIDISM DUE TO ACQUIRED ATROPHY OF THYROID: ICD-10-CM

## 2021-06-30 RX ORDER — LEVOTHYROXINE SODIUM 0.15 MG/1
150 TABLET ORAL
Qty: 90 TABLET | Refills: 0 | Status: SHIPPED | OUTPATIENT
Start: 2021-06-30 | End: 2021-07-01

## 2021-06-30 NOTE — PATIENT COMMUNICATION
Received request via: Patient    Was the patient seen in the last year in this department? Yes    Does the patient have an active prescription (recently filled or refills available) for medication(s) requested? No     Requested Prescriptions     Pending Prescriptions Disp Refills   • levothyroxine (SYNTHROID) 150 MCG Tab 90 tablet 0     Sig: Take 1 tablet by mouth every morning on an empty stomach.

## 2021-07-01 ENCOUNTER — TELEMEDICINE (OUTPATIENT)
Dept: MEDICAL GROUP | Facility: PHYSICIAN GROUP | Age: 51
End: 2021-07-01
Payer: COMMERCIAL

## 2021-07-01 VITALS — HEIGHT: 66 IN | WEIGHT: 190 LBS | BODY MASS INDEX: 30.53 KG/M2

## 2021-07-01 DIAGNOSIS — F41.0 PANIC ATTACKS: ICD-10-CM

## 2021-07-01 DIAGNOSIS — E03.4 HYPOTHYROIDISM DUE TO ACQUIRED ATROPHY OF THYROID: ICD-10-CM

## 2021-07-01 DIAGNOSIS — F41.9 ANXIETY: ICD-10-CM

## 2021-07-01 DIAGNOSIS — E78.2 MIXED HYPERLIPIDEMIA: ICD-10-CM

## 2021-07-01 PROCEDURE — 99214 OFFICE O/P EST MOD 30 MIN: CPT | Mod: 95 | Performed by: NURSE PRACTITIONER

## 2021-07-01 RX ORDER — CITALOPRAM 20 MG/1
20 TABLET ORAL DAILY
Qty: 90 TABLET | Refills: 3 | Status: SHIPPED | OUTPATIENT
Start: 2021-07-01 | End: 2021-12-08

## 2021-07-01 RX ORDER — LEVOTHYROXINE SODIUM 0.1 MG/1
100 TABLET ORAL
Qty: 90 TABLET | Refills: 0 | Status: SHIPPED | OUTPATIENT
Start: 2021-07-01 | End: 2021-12-09 | Stop reason: SDUPTHER

## 2021-07-01 RX ORDER — CITALOPRAM HYDROBROMIDE 10 MG/1
10 TABLET ORAL DAILY
Qty: 90 TABLET | Refills: 0 | Status: SHIPPED | OUTPATIENT
Start: 2021-07-01 | End: 2021-09-03

## 2021-07-01 RX ORDER — ROSUVASTATIN CALCIUM 5 MG/1
5 TABLET, COATED ORAL EVERY EVENING
Qty: 90 TABLET | Refills: 1 | Status: SHIPPED | OUTPATIENT
Start: 2021-07-01 | End: 2021-09-13

## 2021-07-01 ASSESSMENT — FIBROSIS 4 INDEX: FIB4 SCORE: 1.02

## 2021-07-01 NOTE — ASSESSMENT & PLAN NOTE
Chronic problem. Patient states that he contacts our prayer.  She is currently on citalopram 20 mg which is helping.  She is also referred to therapy, will contact therapist and schedule an appointment.

## 2021-07-01 NOTE — PROGRESS NOTES
Telemedicine Visit: Established Patient     This encounter was conducted via Zoom.   Verbal consent was obtained. Patient's identity was verified.    Subjective:     Chief Complaint   Patient presents with   • Anxiety     medication check      Cherri Shah is a 50 y.o. female presenting for evaluation and management of following problems:    Anxiety  Chronic problem.  Currently on Celexa 20 mg daily.  Patient states that her symptoms are overall well controlled except certain days would feel that she would benefit from a higher dose and would like to try 30 mg daily.    Panic attacks  Chronic problem. Patient states that he contacts our prayer.  She is currently on citalopram 20 mg which is helping.  She is also referred to therapy, will contact therapist and schedule an appointment.    Hypothyroidism  Results for CHERRI SHAH (MRN [id number]) as of 7/1/2021 11:56   Ref. Range 5/17/2021 06:49   TSH Latest Ref Range: 0.380 - 5.330 uIU/mL 0.740   Free T-4 Latest Ref Range: 0.93 - 1.70 ng/dL 1.07   Thyroid Stim Immunoglobulin Latest Ref Range: <=0.54 IU/L <0.10   Levothyroxine 150 mcg, patient admits to inconsistency in taking her medications.    Mixed hyperlipidemia  Results for CHERRI SHAH (MRN [id number]) as of 7/1/2021 11:56   Ref. Range 5/17/2021 06:49   Cholesterol,Tot Latest Ref Range: 100 - 199 mg/dL 213 (H)   Triglycerides Latest Ref Range: 0 - 149 mg/dL 143   HDL Latest Ref Range: >=40 mg/dL 46   LDL Latest Ref Range: <100 mg/dL 138 (H)   The 10-year ASCVD risk score (Betsey CANDELARIO Jr., et al., 2013) is: 1.2%    Values used to calculate the score:      Age: 50 years      Sex: Female      Is Non- : No      Diabetic: No      Tobacco smoker: No      Systolic Blood Pressure: 110 mmHg      Is BP treated: No      HDL Cholesterol: 46 mg/dL      Total Cholesterol: 213 mg/dL     Patient reports being very active, exercising and following a healthy diet.  She has not previously been  "on a statin.  She is non-smoker.  History and strokes in grandmother in her 80s.      ROS   Denies any recent fevers or chills. No nausea or vomiting. No chest pains or shortness of breath.     Allergies   Allergen Reactions   • Ultram [Tramadol Hcl] Itching       Current medicines (including changes today)  Current Outpatient Medications   Medication Sig Dispense Refill   • citalopram (CELEXA) 10 MG tablet Take 1 tablet by mouth every day. 90 tablet 0   • citalopram (CELEXA) 20 MG Tab Take 1 tablet by mouth every day. 90 tablet 3   • levothyroxine (SYNTHROID) 100 MCG Tab Take 1 tablet by mouth every morning on an empty stomach. 90 tablet 0   • rosuvastatin (CRESTOR) 5 MG Tab Take 1 tablet by mouth every evening. 90 tablet 1     No current facility-administered medications for this visit.       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  She  has a past surgical history that includes hysterectomy, total abdominal (2012); cholecystectomy (2002); appendectomy (2012); gastric banding laparoscopic (2008); tubal ligation; and laminotomy.       Objective:   Vitals obtained by patient:  Ht 1.676 m (5' 6\")   Wt 86.2 kg (190 lb)   BMI 30.67 kg/m²      Physical " Exam:  General: No acute distress. Well nourished.   HEENT: EOM grossly intact, no scleral icterus, no pharyngeal erythema.   Neck:  No JVD noted at 90 degrees, trachea midline  CVS: Pulse as reported by patient, no visible LE edema.  Resp: Unlabored respiratory effort, no cough or audible wheeze  MSK/Ext: No clubbing or cyanosis visible appreciated.  Skin: No rashes in visible areas.  Neurological: AOx3. CN III-XII grossly intact. No focal deficits.     LABS: 2020, 2021 results reviewed and discussed with the patient, questions answered.    My total time spent caring for the patient on the day of the encounter was 25 minutes.   This does not include time spent on separately billable procedures/tests.   Assessment and Plan:   1. Panic attacks  Improving.  Trial of citalopram 30 mg daily.  We will follow-up next visit.  Patient will also set up an appointment with therapy  - citalopram (CELEXA) 10 MG tablet; Take 1 tablet by mouth every day.  Dispense: 90 tablet; Refill: 0  - citalopram (CELEXA) 20 MG Tab; Take 1 tablet by mouth every day.  Dispense: 90 tablet; Refill: 3    2. Anxiety  As discussed in 1  - citalopram (CELEXA) 10 MG tablet; Take 1 tablet by mouth every day.  Dispense: 90 tablet; Refill: 0  - citalopram (CELEXA) 20 MG Tab; Take 1 tablet by mouth every day.  Dispense: 90 tablet; Refill: 3    3. Hypothyroidism due to acquired atrophy of thyroid  Stable.  Discussed potential concerns of initiating levothyroxine 115 taking it daily which can potentially decrease TSH in 8 days current symptoms of anxiety, palpitations etc.  Will start on levothyroxine 100 mcg daily, will recheck thyroid panel in 6 weeks.  We will follow-up in the following visit.  - TSH; Future  - FREE THYROXINE; Future    4. Mixed hyperlipidemia  Uncontrolled, improving.  Improvement due to lifestyle modifications, healthy diet and exercise.  Discussed potential risk factors.  Trial of Crestor 5 mg nightly.  - rosuvastatin (CRESTOR) 5 MG  Tab; Take 1 tablet by mouth every evening.  Dispense: 90 tablet; Refill: 1  - HEPATIC FUNCTION PANEL; Future  - Lipid Profile; Future       Follow-up: No follow-ups on file.

## 2021-07-01 NOTE — ASSESSMENT & PLAN NOTE
Chronic problem.  Currently on Celexa 20 mg daily.  Patient states that her symptoms are overall well controlled except certain days would feel that she would benefit from a higher dose and would like to try 30 mg daily.

## 2021-07-01 NOTE — ASSESSMENT & PLAN NOTE
Results for CHERRI SHAH (MRN [id number]) as of 7/1/2021 11:56   Ref. Range 5/17/2021 06:49   TSH Latest Ref Range: 0.380 - 5.330 uIU/mL 0.740   Free T-4 Latest Ref Range: 0.93 - 1.70 ng/dL 1.07   Thyroid Stim Immunoglobulin Latest Ref Range: <=0.54 IU/L <0.10   Levothyroxine 150 mcg, patient admits to inconsistency in taking her medications.

## 2021-07-01 NOTE — ASSESSMENT & PLAN NOTE
Results for CHERRI SHAH (MRN [id number]) as of 7/1/2021 11:56   Ref. Range 5/17/2021 06:49   Cholesterol,Tot Latest Ref Range: 100 - 199 mg/dL 213 (H)   Triglycerides Latest Ref Range: 0 - 149 mg/dL 143   HDL Latest Ref Range: >=40 mg/dL 46   LDL Latest Ref Range: <100 mg/dL 138 (H)   The 10-year ASCVD risk score (Betsey PALOMINO Jr., et al., 2013) is: 1.2%    Values used to calculate the score:      Age: 50 years      Sex: Female      Is Non- : No      Diabetic: No      Tobacco smoker: No      Systolic Blood Pressure: 110 mmHg      Is BP treated: No      HDL Cholesterol: 46 mg/dL      Total Cholesterol: 213 mg/dL     Patient reports being very active, exercising and following a healthy diet.  She has not previously been on a statin.  She is non-smoker.  History and strokes in grandmother in her 80s.

## 2021-08-24 ENCOUNTER — APPOINTMENT (OUTPATIENT)
Dept: MEDICAL GROUP | Facility: PHYSICIAN GROUP | Age: 51
End: 2021-08-24
Payer: COMMERCIAL

## 2021-09-03 DIAGNOSIS — F41.0 PANIC ATTACKS: ICD-10-CM

## 2021-09-03 DIAGNOSIS — F41.9 ANXIETY: ICD-10-CM

## 2021-09-07 RX ORDER — CITALOPRAM HYDROBROMIDE 10 MG/1
TABLET ORAL
Qty: 90 TABLET | Refills: 0 | Status: SHIPPED | OUTPATIENT
Start: 2021-09-07 | End: 2021-12-09 | Stop reason: SDUPTHER

## 2021-09-11 DIAGNOSIS — E78.2 MIXED HYPERLIPIDEMIA: ICD-10-CM

## 2021-09-13 NOTE — TELEPHONE ENCOUNTER
Received request via: Pharmacy    Was the patient seen in the last year in this department? Yes    Does the patient have an active prescription (recently filled or refills available) for medication(s) requested? No     has No Known Allergies. Current Outpatient Prescriptions:     metFORMIN (GLUCOPHAGE) 500 MG tablet, Take 500 mg by mouth 2 times daily (with meals) , Disp: , Rfl:     glipiZIDE (GLUCOTROL) 10 MG tablet, Take 10 mg by mouth daily , Disp: , Rfl:     gabapentin (NEURONTIN) 800 MG tablet, 800 mg 3 times daily. ., Disp: , Rfl:     pioglitazone (ACTOS) 45 MG tablet, Take 45 mg by mouth daily , Disp: , Rfl:     simvastatin (ZOCOR) 40 MG tablet, , Disp: , Rfl:     isosorbide mononitrate (IMDUR) 30 MG extended release tablet, TAKE 1 TABLET BY MOUTH ONE TIME A DAY . needs office visit , Disp: 30 tablet, Rfl: 0    losartan (COZAAR) 100 MG tablet, Take 100 mg by mouth daily , Disp: , Rfl:     MAPAP 500 MG tablet, TAKE 1 TABLET BY MOUTH EVERY EIGHT HOURS AS NEEDED, Disp: , Rfl: 0    meloxicam (MOBIC) 15 MG tablet, Take 1 tablet by mouth daily, Disp: 30 tablet, Rfl: 3    ibuprofen (ADVIL;MOTRIN) 600 MG tablet, Take 1 tablet by mouth every 6 hours as needed for Pain, Disp: 20 tablet, Rfl: 0    fenofibrate (TRICOR) 145 MG tablet, Take 145 mg by mouth daily. , Disp: , Rfl:     aspirin 81 MG EC tablet, Take 81 mg by mouth daily. , Disp: , Rfl:     Objective:   PHYSICAL EXAM:   BP 95/60   Pulse 58   Temp 98.8 °F (37.1 °C) (Oral)   Resp 20   Ht 5' (1.524 m)   Wt 197 lb 9.6 oz (89.6 kg)   SpO2 96% Comment: RA  BMI 38.59 kg/m²    Constitutional:  No acute distress. Eyes: PERRL. Conjunctivae anicteric. ENT: Normal nose. Normal tongue. Oropharynx is clear. Neck:  Trachea is midline. No thyroid tenderness. Respiratory: No accessory muscle usage. No wheezes. No rales. No Rhonchi. Cardiovascular: Normal S1S2. No digit clubbing. No digit cyanosis. No LE edema. Lymphatic: No cervical or supraclavicular adenopathy. Skin: No rash on the exposed extremities. No Nodules or induration on exposed extremities. Psychiatric: No anxiety or Agitation. Alert and Oriented to person, place and time.     LABS:  The

## 2021-09-14 RX ORDER — ROSUVASTATIN CALCIUM 5 MG/1
5 TABLET, COATED ORAL EVERY EVENING
Qty: 90 TABLET | Refills: 0 | Status: SHIPPED | OUTPATIENT
Start: 2021-09-14 | End: 2021-12-09

## 2021-12-08 DIAGNOSIS — F41.0 PANIC ATTACKS: ICD-10-CM

## 2021-12-08 DIAGNOSIS — F41.9 ANXIETY: ICD-10-CM

## 2021-12-08 RX ORDER — CITALOPRAM 20 MG/1
TABLET ORAL
Qty: 90 TABLET | Refills: 0 | Status: SHIPPED | OUTPATIENT
Start: 2021-12-08 | End: 2021-12-09 | Stop reason: SDUPTHER

## 2021-12-08 NOTE — TELEPHONE ENCOUNTER
Received request via: Pharmacy    Was the patient seen in the last year in this department? Yes    Does the patient have an active prescription (recently filled or refills available) for medication(s) requested? No        Current Outpatient Medications   Medication Sig Dispense Refill   • rosuvastatin (CRESTOR) 5 MG Tab Take 1 tablet by mouth every evening. 90 Tablet 0   • citalopram (CELEXA) 10 MG tablet Take 1 tablet by mouth once daily. **Take with 20 mg tablet for total daily dose of 30 mg.** 90 Tablet 0   • citalopram (CELEXA) 20 MG Tab Take 1 tablet by mouth every day. 90 tablet 3   • levothyroxine (SYNTHROID) 100 MCG Tab Take 1 tablet by mouth every morning on an empty stomach. 90 tablet 0     No current facility-administered medications for this visit.

## 2021-12-09 ENCOUNTER — TELEMEDICINE (OUTPATIENT)
Dept: MEDICAL GROUP | Facility: PHYSICIAN GROUP | Age: 51
End: 2021-12-09
Payer: COMMERCIAL

## 2021-12-09 VITALS
HEART RATE: 62 BPM | DIASTOLIC BLOOD PRESSURE: 73 MMHG | SYSTOLIC BLOOD PRESSURE: 117 MMHG | HEIGHT: 67 IN | WEIGHT: 185 LBS | BODY MASS INDEX: 29.03 KG/M2

## 2021-12-09 DIAGNOSIS — Z12.12 SCREENING FOR COLORECTAL CANCER: ICD-10-CM

## 2021-12-09 DIAGNOSIS — F41.9 ANXIETY: ICD-10-CM

## 2021-12-09 DIAGNOSIS — Z00.00 ANNUAL PHYSICAL EXAM: ICD-10-CM

## 2021-12-09 DIAGNOSIS — E03.4 HYPOTHYROIDISM DUE TO ACQUIRED ATROPHY OF THYROID: ICD-10-CM

## 2021-12-09 DIAGNOSIS — Z12.11 SCREENING FOR COLORECTAL CANCER: ICD-10-CM

## 2021-12-09 DIAGNOSIS — F41.0 PANIC ATTACKS: ICD-10-CM

## 2021-12-09 PROCEDURE — 99214 OFFICE O/P EST MOD 30 MIN: CPT | Mod: 95 | Performed by: NURSE PRACTITIONER

## 2021-12-09 RX ORDER — LEVOTHYROXINE SODIUM 0.1 MG/1
100 TABLET ORAL
Qty: 90 TABLET | Refills: 0 | Status: SHIPPED | OUTPATIENT
Start: 2021-12-09 | End: 2022-05-27

## 2021-12-09 RX ORDER — LEVOTHYROXINE SODIUM 0.1 MG/1
100 TABLET ORAL
Qty: 90 TABLET | Refills: 0 | Status: SHIPPED | OUTPATIENT
Start: 2021-12-09 | End: 2021-12-10 | Stop reason: SDUPTHER

## 2021-12-09 RX ORDER — CITALOPRAM HYDROBROMIDE 10 MG/1
10 TABLET ORAL EVERY MORNING
Qty: 90 TABLET | Refills: 3 | Status: SHIPPED | OUTPATIENT
Start: 2021-12-09 | End: 2022-05-27

## 2021-12-09 RX ORDER — CITALOPRAM 20 MG/1
20 TABLET ORAL EVERY MORNING
Qty: 90 TABLET | Refills: 3 | Status: SHIPPED | OUTPATIENT
Start: 2021-12-09 | End: 2021-12-10 | Stop reason: SDUPTHER

## 2021-12-09 ASSESSMENT — FIBROSIS 4 INDEX: FIB4 SCORE: 1.04

## 2021-12-09 NOTE — ASSESSMENT & PLAN NOTE
Chronic problem.  This is well controlled on Celexa 30 mg daily.  She would like a refill today.  She denies SI, HI

## 2021-12-09 NOTE — PROGRESS NOTES
Telemedicine Visit: Established Patient     This encounter was conducted via Zoom.   Verbal consent was obtained. Patient's identity was verified.    Subjective:     Chief Complaint   Patient presents with   • Referral Needed     colonoscopy    • Requesting Labs   • Medication Refill     (SYNTHROID     Cherri Shah is a 51 y.o. female presenting for evaluation and management of following problems:    Hypothyroidism  Results for CHERRI SHAH (MRN [id number]) as of 12/9/2021 11:04   Ref. Range 5/17/2021 06:49   TSH Latest Ref Range: 0.380 - 5.330 uIU/mL 0.740   Free T-4 Latest Ref Range: 0.93 - 1.70 ng/dL 1.07   Thyroid Stim Immunoglobulin Latest Ref Range: <=0.54 IU/L <0.10   Currently on levothyroxine 100 mcg.  States she is feeling so much better, sleeping better her anxiety is well controlled.  No recent data.  Will need to obtain thyroid labs.    Anxiety  Chronic problem.  This is well controlled on Celexa 30 mg daily.  She would like a refill today.  She denies SI, HI      ROS   Denies any recent fevers or chills. No nausea or vomiting. No chest pains or shortness of breath.     Allergies   Allergen Reactions   • Ultram [Tramadol Hcl] Itching       Current medicines (including changes today)  Current Outpatient Medications   Medication Sig Dispense Refill   • levothyroxine (SYNTHROID) 100 MCG Tab Take 1 Tablet by mouth every morning on an empty stomach. 90 Tablet 0   • citalopram (CELEXA) 20 MG Tab Take 1 Tablet by mouth every morning. 90 Tablet 3   • citalopram (CELEXA) 10 MG tablet Take 1 Tablet by mouth every morning. 90 Tablet 3     No current facility-administered medications for this visit.       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  She  has a past surgical history that includes hysterectomy, total abdominal (2012); cholecystectomy (2002); appendectomy (2012); gastric banding laparoscopic (2008); tubal ligation; and laminotomy.       Objective:   Vitals obtained by patient:  /73   Pulse 62   Ht 1.707 m (5' 7.2\")   Wt 83.9 kg (185 lb)   BMI 28.80 kg/m²      Physical Exam:  General: No acute distress. Well nourished.   HEENT: EOM grossly intact, no scleral icterus, no pharyngeal erythema.   Neck:  No JVD noted at 90 degrees, trachea midline  CVS: Pulse as reported by patient, no visible LE edema.  Resp: Unlabored respiratory effort, no cough or audible wheeze  MSK/Ext: No clubbing or cyanosis visible appreciated.  Skin: No rashes in visible areas.  Neurological: AOx3. CN III-XII grossly intact. No focal deficits.     LABS: 5/2021  results reviewed and discussed with the patient, questions answered.    Assessment and Plan:   1. Screening for colorectal cancer  - Referral to GI for Colonoscopy    2. Annual physical exam  - CBC WITHOUT DIFFERENTIAL; Future  - Comp Metabolic Panel; Future  - Lipid Profile; Future  - TSH; Future  - FREE THYROXINE; Future    3. Hypothyroidism due to acquired atrophy of thyroid  Stable on current regimen.  Will need to recheck labs and discuss findings with patient.  For now continue current dose.  - levothyroxine (SYNTHROID) 100 MCG Tab; Take 1 Tablet by mouth every morning on an empty stomach.  " Dispense: 90 Tablet; Refill: 0    4. Panic attacks  Stable on current regimen.  Continue.  Refills given  - citalopram (CELEXA) 20 MG Tab; Take 1 Tablet by mouth every morning.  Dispense: 90 Tablet; Refill: 3  - citalopram (CELEXA) 10 MG tablet; Take 1 Tablet by mouth every morning.  Dispense: 90 Tablet; Refill: 3    5. Anxiety  Stable on current regimen.  Continue.  Refills given.  - citalopram (CELEXA) 20 MG Tab; Take 1 Tablet by mouth every morning.  Dispense: 90 Tablet; Refill: 3  - citalopram (CELEXA) 10 MG tablet; Take 1 Tablet by mouth every morning.  Dispense: 90 Tablet; Refill: 3       Follow-up: No follow-ups on file.

## 2021-12-09 NOTE — ASSESSMENT & PLAN NOTE
Results for CHERRI SHAH (MRN [id number]) as of 12/9/2021 11:04   Ref. Range 5/17/2021 06:49   TSH Latest Ref Range: 0.380 - 5.330 uIU/mL 0.740   Free T-4 Latest Ref Range: 0.93 - 1.70 ng/dL 1.07   Thyroid Stim Immunoglobulin Latest Ref Range: <=0.54 IU/L <0.10   Currently on levothyroxine 100 mcg.  States she is feeling so much better, sleeping better her anxiety is well controlled.  No recent data.  Will need to obtain thyroid labs.

## 2021-12-10 DIAGNOSIS — F41.9 ANXIETY: ICD-10-CM

## 2021-12-10 DIAGNOSIS — E03.4 HYPOTHYROIDISM DUE TO ACQUIRED ATROPHY OF THYROID: ICD-10-CM

## 2021-12-10 DIAGNOSIS — F41.0 PANIC ATTACKS: ICD-10-CM

## 2021-12-10 RX ORDER — CITALOPRAM 20 MG/1
20 TABLET ORAL EVERY MORNING
Qty: 90 TABLET | Refills: 3 | Status: SHIPPED | OUTPATIENT
Start: 2021-12-10 | End: 2022-05-27 | Stop reason: SDUPTHER

## 2021-12-10 RX ORDER — LEVOTHYROXINE SODIUM 0.1 MG/1
100 TABLET ORAL
Qty: 90 TABLET | Refills: 0 | Status: SHIPPED | OUTPATIENT
Start: 2021-12-10 | End: 2022-05-27

## 2022-03-08 ENCOUNTER — APPOINTMENT (RX ONLY)
Dept: URBAN - METROPOLITAN AREA CLINIC 31 | Facility: CLINIC | Age: 52
Setting detail: DERMATOLOGY
End: 2022-03-08

## 2022-03-08 DIAGNOSIS — D22 MELANOCYTIC NEVI: ICD-10-CM

## 2022-03-08 DIAGNOSIS — D18.0 HEMANGIOMA: ICD-10-CM

## 2022-03-08 DIAGNOSIS — Z71.89 OTHER SPECIFIED COUNSELING: ICD-10-CM

## 2022-03-08 DIAGNOSIS — L81.4 OTHER MELANIN HYPERPIGMENTATION: ICD-10-CM

## 2022-03-08 DIAGNOSIS — L72.0 EPIDERMAL CYST: ICD-10-CM

## 2022-03-08 DIAGNOSIS — L82.1 OTHER SEBORRHEIC KERATOSIS: ICD-10-CM

## 2022-03-08 DIAGNOSIS — Z41.9 ENCOUNTER FOR PROCEDURE FOR PURPOSES OTHER THAN REMEDYING HEALTH STATE, UNSPECIFIED: ICD-10-CM

## 2022-03-08 PROBLEM — D22.71 MELANOCYTIC NEVI OF RIGHT LOWER LIMB, INCLUDING HIP: Status: ACTIVE | Noted: 2022-03-08

## 2022-03-08 PROBLEM — D22.61 MELANOCYTIC NEVI OF RIGHT UPPER LIMB, INCLUDING SHOULDER: Status: ACTIVE | Noted: 2022-03-08

## 2022-03-08 PROBLEM — D22.62 MELANOCYTIC NEVI OF LEFT UPPER LIMB, INCLUDING SHOULDER: Status: ACTIVE | Noted: 2022-03-08

## 2022-03-08 PROBLEM — D18.01 HEMANGIOMA OF SKIN AND SUBCUTANEOUS TISSUE: Status: ACTIVE | Noted: 2022-03-08

## 2022-03-08 PROBLEM — D22.5 MELANOCYTIC NEVI OF TRUNK: Status: ACTIVE | Noted: 2022-03-08

## 2022-03-08 PROBLEM — D22.72 MELANOCYTIC NEVI OF LEFT LOWER LIMB, INCLUDING HIP: Status: ACTIVE | Noted: 2022-03-08

## 2022-03-08 PROCEDURE — 99396 PREV VISIT EST AGE 40-64: CPT

## 2022-03-08 PROCEDURE — ? COUNSELING

## 2022-03-08 PROCEDURE — ? ADDITIONAL NOTES

## 2022-03-08 ASSESSMENT — LOCATION SIMPLE DESCRIPTION DERM
LOCATION SIMPLE: ABDOMEN
LOCATION SIMPLE: RIGHT UPPER BACK
LOCATION SIMPLE: RIGHT ELBOW
LOCATION SIMPLE: RIGHT KNEE
LOCATION SIMPLE: RIGHT EYELID
LOCATION SIMPLE: RIGHT THIGH
LOCATION SIMPLE: LEFT POSTERIOR THIGH
LOCATION SIMPLE: LEFT CHEEK
LOCATION SIMPLE: LEFT FOREARM
LOCATION SIMPLE: RIGHT POSTERIOR THIGH
LOCATION SIMPLE: RIGHT CHEEK
LOCATION SIMPLE: LEFT POSTERIOR UPPER ARM
LOCATION SIMPLE: LEFT KNEE
LOCATION SIMPLE: RIGHT FOREARM
LOCATION SIMPLE: LEFT HAND
LOCATION SIMPLE: LEFT THIGH
LOCATION SIMPLE: RIGHT POSTERIOR UPPER ARM
LOCATION SIMPLE: LEFT ELBOW
LOCATION SIMPLE: LEFT EYELID
LOCATION SIMPLE: RIGHT HAND
LOCATION SIMPLE: CHEST

## 2022-03-08 ASSESSMENT — LOCATION ZONE DERM
LOCATION ZONE: ARM
LOCATION ZONE: HAND
LOCATION ZONE: EYELID
LOCATION ZONE: FACE
LOCATION ZONE: TRUNK
LOCATION ZONE: LEG

## 2022-03-08 ASSESSMENT — LOCATION DETAILED DESCRIPTION DERM
LOCATION DETAILED: RIGHT DISTAL POSTERIOR THIGH
LOCATION DETAILED: LEFT INFERIOR CENTRAL MALAR CHEEK
LOCATION DETAILED: LEFT VENTRAL DISTAL FOREARM
LOCATION DETAILED: LEFT PROXIMAL POSTERIOR UPPER ARM
LOCATION DETAILED: UPPER STERNUM
LOCATION DETAILED: RIGHT ANTERIOR PROXIMAL THIGH
LOCATION DETAILED: RIGHT PROXIMAL POSTERIOR UPPER ARM
LOCATION DETAILED: LEFT ULNAR DORSAL HAND
LOCATION DETAILED: RIGHT MEDIAL UPPER BACK
LOCATION DETAILED: RIGHT LATERAL CANTHUS
LOCATION DETAILED: RIGHT KNEE
LOCATION DETAILED: RIGHT INFERIOR UPPER BACK
LOCATION DETAILED: RIGHT ULNAR DORSAL HAND
LOCATION DETAILED: RIGHT PROXIMAL DORSAL FOREARM
LOCATION DETAILED: EPIGASTRIC SKIN
LOCATION DETAILED: LEFT KNEE
LOCATION DETAILED: LOWER STERNUM
LOCATION DETAILED: LEFT ANTERIOR PROXIMAL THIGH
LOCATION DETAILED: RIGHT VENTRAL DISTAL FOREARM
LOCATION DETAILED: LEFT ELBOW
LOCATION DETAILED: LEFT LATERAL CANTHUS
LOCATION DETAILED: RIGHT ELBOW
LOCATION DETAILED: LEFT DISTAL POSTERIOR THIGH
LOCATION DETAILED: RIGHT INFERIOR CENTRAL MALAR CHEEK
LOCATION DETAILED: LEFT PROXIMAL DORSAL FOREARM

## 2022-03-08 NOTE — PROCEDURE: ADDITIONAL NOTES
Detail Level: Zone
Render Risk Assessment In Note?: no
Additional Notes: Pt has been attempting to limit tanning bed use since last visit. Encouraged regarding continued avoidance going forward.
Additional Notes: Discussed etiology of milia and reassured regarding benign nature. \\Charlene lesions are very close to lid margin, recommend inquiring as to whether eye care provider is comfortable treating these lesions. Pt counseled that her routine eye care provider may refer her to oculoplastics.
Additional Notes: Pt referred to [facility] office for cosmetic consultation to discussed color and texture change on face.

## 2022-05-06 NOTE — PROGRESS NOTES
"05/10/22    Subjective    Chief Complaint:  Question of a coagulation problem    HPI:  51 female referred by Lauren PULLIAM for consultation prior to planned GI endoscopies. Apparently there is a h/o possible von Willebrand's disease. Says was worked up prior to a hysterectomy and told she had von Willebrand's. Does think she bleeds easily. Saw Galileo Oswald a number of years later because of a \"stroke\" in the right eye - he did a thrombophilia w/u which was negative. He also said she does not have von Willebrand's. She has had c-spine and L-spine surgery and a gastric band procedure all w/o any evidence of excessive bleeding.     ROS:    Constitutional: 100 lb  weight loss after the gastric banding  Skin: No rash or jaundice  HENT: Poor vision in the right eye  Cardiovascular:No chest pain or arrythmia  Respiratory:No cough or SOB  GI:No nausea, vomiting, diarrhea, constipation  :No dysuria or frequency  Musculoskeletal:No bone or joint pain  Neuro:No sx's of neuropathy  Psych: No complaints    PMH:      Allergies   Allergen Reactions   • Ultram [Tramadol Hcl] Itching       Past Medical History:   Diagnosis Date   • Hypothyroid    • Lyme disease         Past Surgical History:   Procedure Laterality Date   • HYSTERECTOMY, TOTAL ABDOMINAL  2012   • APPENDECTOMY  2012   • GASTRIC BANDING LAPAROSCOPIC  2008   • CHOLECYSTECTOMY  2002   • LAMINOTOMY     • TUBAL LIGATION          Medications:    Current Outpatient Medications on File Prior to Visit   Medication Sig Dispense Refill   • citalopram (CELEXA) 20 MG Tab Take 1 Tablet by mouth every morning. 90 Tablet 3   • citalopram (CELEXA) 10 MG tablet Take 1 Tablet by mouth every morning. 90 Tablet 3   • levothyroxine (SYNTHROID) 100 MCG Tab Take 1 Tablet by mouth every morning on an empty stomach. 90 Tablet 0   • levothyroxine (SYNTHROID) 100 MCG Tab Take 1 Tablet by mouth every morning on an empty stomach. (Patient not taking: Reported on 5/10/2022) 90 Tablet 0 " "    No current facility-administered medications on file prior to visit.       Social History     Tobacco Use   • Smoking status: Never Smoker   • Smokeless tobacco: Never Used   Substance Use Topics   • Alcohol use: Yes     Comment: occ        Family History   Problem Relation Age of Onset   • Cancer Mother         Objective    Vitals:    /54 (BP Location: Left arm, Patient Position: Sitting, BP Cuff Size: Adult)   Pulse 73   Temp 36.6 °C (97.9 °F) (Temporal)   Resp 16   Ht 1.707 m (5' 7.21\")   Wt 89.1 kg (196 lb 6.9 oz)   SpO2 99%   BMI 30.58 kg/m²     Physical Exam:    Appears well-developed and well-nourished. No distress.    Head -  Normocephalic .   Eyes - Pupils are equal and both react to light. Conjunctivae normal. No scleral icterus.   Ears - normal hearing  Neck - Normal range of motion. Neck supple. No thyromegaly  Cardiovascular - Normal rate, regular rhythm, normal heart sounds and intact distal pulses. No  gallop, murmur or rub  Pulmonary - Normal breath sounds.  No wheeze, rales or rhonci  Breast - symmetrical. No mass on indentation.  Abdominal -Soft. No distension, tenderness, organomegaly or mass. Band port palpable in the LUQ  Extremities-  No edema or tenderness.    Nodes - No submental, submandibular, preauricular, cervical, axillary or inguinal adenopathy.    Neurological -   Alert and oriented   Skin - Skin is warm and dry. No rash noted. Not diaphoretic. No erythema. No pallor. No jaundice   Psychiatric -  Normal mood and affect.    Labs:      Assessment  Doubt von Willebrand's but will do the lab  Imp:    Visit Diagnosis:    1. Coagulopathy (HCC)  VON WILLEBRAND'S PROFILE    APTT    Prothrombin Time    CBC WITH DIFFERENTIAL   2. Status post bariatric surgery  CBC WITH DIFFERENTIAL     Plan:  Look for lab in [facility] and if any abnormal and she has not heard from me send me a message    Ronald Sharp M.D.        "

## 2022-05-10 ENCOUNTER — OFFICE VISIT (OUTPATIENT)
Dept: HEMATOLOGY ONCOLOGY | Facility: MEDICAL CENTER | Age: 52
End: 2022-05-10
Payer: COMMERCIAL

## 2022-05-10 VITALS
TEMPERATURE: 97.9 F | BODY MASS INDEX: 30.83 KG/M2 | SYSTOLIC BLOOD PRESSURE: 104 MMHG | HEART RATE: 73 BPM | WEIGHT: 196.43 LBS | HEIGHT: 67 IN | OXYGEN SATURATION: 99 % | DIASTOLIC BLOOD PRESSURE: 54 MMHG | RESPIRATION RATE: 16 BRPM

## 2022-05-10 DIAGNOSIS — D68.9 COAGULOPATHY (HCC): ICD-10-CM

## 2022-05-10 DIAGNOSIS — Z98.84 STATUS POST BARIATRIC SURGERY: ICD-10-CM

## 2022-05-10 PROCEDURE — 99203 OFFICE O/P NEW LOW 30 MIN: CPT | Performed by: INTERNAL MEDICINE

## 2022-05-10 ASSESSMENT — PAIN SCALES - GENERAL: PAINLEVEL: NO PAIN

## 2022-05-10 ASSESSMENT — FIBROSIS 4 INDEX: FIB4 SCORE: 1.04

## 2022-05-20 LAB
ALBUMIN SERPL-MCNC: 4.3 G/DL (ref 3.8–4.8)
ALBUMIN/GLOB SERPL: 1.5 {RATIO} (ref 1.2–2.2)
ALP SERPL-CCNC: 67 IU/L (ref 44–121)
ALT SERPL-CCNC: 20 IU/L (ref 0–32)
APTT 1H NP CONT PPP: NORMAL S
APTT 1H NP PPP: NORMAL S
APTT IMM NP PPP: NORMAL S
APTT PPP 1:1 SALINE: NORMAL S
APTT PPP: 25.1 SEC (ref 22.9–30.2)
AST SERPL-CCNC: 26 IU/L (ref 0–40)
BASOPHILS # BLD AUTO: 0 X10E3/UL (ref 0–0.2)
BASOPHILS NFR BLD AUTO: 1 %
BILIRUB SERPL-MCNC: 0.7 MG/DL (ref 0–1.2)
BUN SERPL-MCNC: 16 MG/DL (ref 6–24)
BUN/CREAT SERPL: 19 (ref 9–23)
CALCIUM SERPL-MCNC: 9.3 MG/DL (ref 8.7–10.2)
CHLORIDE SERPL-SCNC: 107 MMOL/L (ref 96–106)
CO2 SERPL-SCNC: 23 MMOL/L (ref 20–29)
CREAT SERPL-MCNC: 0.85 MG/DL (ref 0.57–1)
EGFRCR SERPLBLD CKD-EPI 2021: 83 ML/MIN/1.73
EOSINOPHIL # BLD AUTO: 0.1 X10E3/UL (ref 0–0.4)
EOSINOPHIL NFR BLD AUTO: 2 %
ERYTHROCYTE [DISTWIDTH] IN BLOOD BY AUTOMATED COUNT: 12.3 % (ref 11.7–15.4)
FACT VIII ACT/NOR PPP: 157 % (ref 56–140)
GLOBULIN SER CALC-MCNC: 2.9 G/DL (ref 1.5–4.5)
GLUCOSE SERPL-MCNC: 86 MG/DL (ref 65–99)
HCT VFR BLD AUTO: 42.4 % (ref 34–46.6)
HGB BLD-MCNC: 14.3 G/DL (ref 11.1–15.9)
IMM GRANULOCYTES # BLD AUTO: 0 X10E3/UL (ref 0–0.1)
IMM GRANULOCYTES NFR BLD AUTO: 0 %
IMMATURE CELLS  115398: NORMAL
INR PPP: 0.9 (ref 0.9–1.2)
LYMPHOCYTES # BLD AUTO: 1.6 X10E3/UL (ref 0.7–3.1)
LYMPHOCYTES NFR BLD AUTO: 40 %
MCH RBC QN AUTO: 32.1 PG (ref 26.6–33)
MCHC RBC AUTO-ENTMCNC: 33.7 G/DL (ref 31.5–35.7)
MCV RBC AUTO: 95 FL (ref 79–97)
MONOCYTES # BLD AUTO: 0.2 X10E3/UL (ref 0.1–0.9)
MONOCYTES NFR BLD AUTO: 6 %
MORPHOLOGY BLD-IMP: NORMAL
NEUTROPHILS # BLD AUTO: 2 X10E3/UL (ref 1.4–7)
NEUTROPHILS NFR BLD AUTO: 51 %
NRBC BLD AUTO-RTO: NORMAL %
PATH INTERP BLD-IMP: NORMAL
PLATELET # BLD AUTO: 202 X10E3/UL (ref 150–450)
POTASSIUM SERPL-SCNC: 4.4 MMOL/L (ref 3.5–5.2)
PROT SERPL-MCNC: 7.2 G/DL (ref 6–8.5)
PROTHROMBIN TIME: 9.8 SEC (ref 9.1–12)
RBC # BLD AUTO: 4.45 X10E6/UL (ref 3.77–5.28)
SODIUM SERPL-SCNC: 142 MMOL/L (ref 134–144)
T4 FREE SERPL-MCNC: 1.22 NG/DL (ref 0.82–1.77)
TSH SERPL DL<=0.005 MIU/L-ACNC: 0.62 UIU/ML (ref 0.45–4.5)
VWF AG ACT/NOR PPP IA: 236 % (ref 50–200)
VWF:RCO ACT/NOR PPP PL AGG: 208 % (ref 50–200)
WBC # BLD AUTO: 3.9 X10E3/UL (ref 3.4–10.8)

## 2022-05-27 ENCOUNTER — TELEMEDICINE (OUTPATIENT)
Dept: MEDICAL GROUP | Facility: PHYSICIAN GROUP | Age: 52
End: 2022-05-27
Payer: COMMERCIAL

## 2022-05-27 DIAGNOSIS — F41.8 SITUATIONAL ANXIETY: ICD-10-CM

## 2022-05-27 DIAGNOSIS — Z12.31 ENCOUNTER FOR SCREENING MAMMOGRAM FOR BREAST CANCER: ICD-10-CM

## 2022-05-27 DIAGNOSIS — E03.4 HYPOTHYROIDISM DUE TO ACQUIRED ATROPHY OF THYROID: ICD-10-CM

## 2022-05-27 DIAGNOSIS — F41.0 PANIC ATTACKS: ICD-10-CM

## 2022-05-27 DIAGNOSIS — F41.9 ANXIETY: ICD-10-CM

## 2022-05-27 DIAGNOSIS — L60.0 ONYCHOCRYPTOSIS: ICD-10-CM

## 2022-05-27 DIAGNOSIS — E55.9 VITAMIN D DEFICIENCY: ICD-10-CM

## 2022-05-27 DIAGNOSIS — E78.2 MIXED HYPERLIPIDEMIA: ICD-10-CM

## 2022-05-27 PROBLEM — E78.5 DYSLIPIDEMIA: Status: RESOLVED | Noted: 2021-05-14 | Resolved: 2022-05-27

## 2022-05-27 PROCEDURE — 99214 OFFICE O/P EST MOD 30 MIN: CPT | Mod: 95 | Performed by: NURSE PRACTITIONER

## 2022-05-27 RX ORDER — LEVOTHYROXINE SODIUM 88 UG/1
88 TABLET ORAL
Qty: 90 TABLET | Refills: 1 | Status: SHIPPED | OUTPATIENT
Start: 2022-05-27 | End: 2023-06-07 | Stop reason: SDUPTHER

## 2022-05-27 RX ORDER — ALPRAZOLAM 0.25 MG/1
0.25 TABLET ORAL NIGHTLY PRN
Qty: 2 TABLET | Refills: 0 | Status: SHIPPED | OUTPATIENT
Start: 2022-05-27 | End: 2022-07-30

## 2022-05-27 RX ORDER — CITALOPRAM 20 MG/1
40 TABLET ORAL EVERY MORNING
Qty: 90 EACH | Refills: 3 | Status: SHIPPED | OUTPATIENT
Start: 2022-05-27 | End: 2022-11-22

## 2022-05-27 RX ORDER — ALPRAZOLAM 0.25 MG/1
0.25 TABLET ORAL NIGHTLY PRN
Qty: 2 TABLET | Refills: 0 | Status: SHIPPED | OUTPATIENT
Start: 2022-05-27 | End: 2022-05-27 | Stop reason: SDUPTHER

## 2022-05-27 ASSESSMENT — PATIENT HEALTH QUESTIONNAIRE - PHQ9
CLINICAL INTERPRETATION OF PHQ2 SCORE: 3
5. POOR APPETITE OR OVEREATING: 1 - SEVERAL DAYS
SUM OF ALL RESPONSES TO PHQ QUESTIONS 1-9: 11

## 2022-05-27 NOTE — ASSESSMENT & PLAN NOTE
Chronic.  Currently on citalopram 40 mg and would like to continue this dose.  States symptoms are well controlled on this.

## 2022-05-27 NOTE — PROGRESS NOTES
Telemedicine Visit: Established Patient     This encounter was conducted via Zoom.   Verbal consent was obtained. Patient's identity was verified.    Subjective:     Chief Complaint   Patient presents with   • Lab Results   • Follow-Up     Would like celexa or alprazolam to calm her nerves/anxiety    • Nail Problem     Possible ingrown toenail, possible referral to podiatrist.      Leigh Whitley is a 51 y.o. female presenting for evaluation and management of following problems:    Mixed hyperlipidemia  Chronic problem.  Total cholesterol 263, triglycerides 162, , HDL 50  Patient reports emotional eating.  She admits to need to improve her diet.    Hypothyroidism  Chronic problem.  TSH 0.617, T4 is 1.22  On levothyroxine 100 mcg, takes 3 to 5 days a week.    Anxiety  Chronic.  Currently on citalopram 40 mg and would like to continue this dose.  States symptoms are well controlled on this.    Onychocryptosis  No problem.  Pain and mild swelling at the upper corner of the big toe and left foot.  Patient gets pedicures professionally done.  No issue on the other side.  History of ingrown toenail.  Would like a referral to podiatry.      ROS   Denies any recent fevers or chills. No nausea or vomiting. No chest pains or shortness of breath.     Allergies   Allergen Reactions   • Ultram [Tramadol Hcl] Itching       Current medicines (including changes today)  Current Outpatient Medications   Medication Sig Dispense Refill   • levothyroxine (SYNTHROID) 88 MCG Tab Take 1 Tablet by mouth every morning on an empty stomach. 90 Tablet 1   • citalopram (CELEXA) 20 MG Tab Take 2 Tablets by mouth every morning. 90 Each 3   • ALPRAZolam (XANAX) 0.25 MG Tab Take 1 Tablet by mouth at bedtime as needed for Anxiety for up to 2 doses. 2 Tablet 0     No current facility-administered medications for this visit.       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  She  has a past surgical history that includes hysterectomy, total abdominal (2012); cholecystectomy (2002); appendectomy (2012); gastric banding laparoscopic (2008); tubal ligation; and laminotomy.       Objective:   Vitals obtained by patient:  There were no vitals taken for this visit.     Physical Exam:  General: No acute distress. Well nourished.   HEENT: EOM grossly intact, no scleral icterus, no pharyngeal erythema.   Neck:  No JVD noted at 90 degrees, trachea midline  CVS: Pulse as reported by patient, no visible LE edema.  Resp: Unlabored respiratory effort, no cough or audible wheeze  MSK/Ext: No clubbing or cyanosis visible appreciated.  Skin: No rashes in visible areas.  Neurological: AOx3. CN III-XII grossly intact. No focal deficits.     LABS: 5/2022  results reviewed and discussed with the patient, questions answered.    Assessment and Plan:   1. Mixed hyperlipidemia  Uncontrolled, stable. Discussed etiology and potential risk factors. Discussed 10-year ASCVD risk. Educated on healthy lifestyle including nutrition and exercise.  Avoid excessive red meat and simple carbohydrates.  Increase fiber intake to 25-30 g daily if tolerated and take fish oil 2 g nightly.  Advised low saturated fat, low  carbohydrate diet.  Quit smoking if you do. Maintain adequate hydration. Advise daily exercise 45-60 mins per tolerance and preference.  - Lipid Profile; Future    2. Hypothyroidism due to acquired atrophy of thyroid  Patient was taking the dose 3-4 times per week.  Will decrease dose and patient encouraged to take new dose daily.  We will follow-up in 3 months.  - TSH; Future  - FREE THYROXINE; Future  - levothyroxine (SYNTHROID) 88 MCG Tab; Take 1 Tablet by mouth every morning on an empty stomach.  Dispense: 90 Tablet; Refill: 1    3. Vitamin D deficiency  - VITAMIN D,25 HYDROXY; Future    4. Anxiety  Stable on current regimen.  Continue.  Refills given  - citalopram (CELEXA) 20 MG Tab; Take 2 Tablets by mouth every morning.  Dispense: 90 Each; Refill: 3  - ALPRAZolam (XANAX) 0.25 MG Tab; Take 1 Tablet by mouth at bedtime as needed for Anxiety for up to 2 doses.  Dispense: 2 Tablet; Refill: 0    5. Panic attacks  - citalopram (CELEXA) 20 MG Tab; Take 2 Tablets by mouth every morning.  Dispense: 90 Each; Refill: 3    6. Onychocryptosis  - Referral to Podiatry    7. Situational anxiety  PDMP checked.  Patient given 2 tabs for flight to [place] use 30 minutes prior to flight.  - ALPRAZolam (XANAX) 0.25 MG Tab; Take 1 Tablet by mouth at bedtime as needed for Anxiety for up to 2 doses.  Dispense: 2 Tablet; Refill: 0       Follow-up: No follow-ups on file.

## 2022-05-27 NOTE — ASSESSMENT & PLAN NOTE
Chronic problem.  Total cholesterol 263, triglycerides 162, , HDL 50  Patient reports emotional eating.  She admits to need to improve her diet.

## 2022-05-27 NOTE — ASSESSMENT & PLAN NOTE
No problem.  Pain and mild swelling at the upper corner of the big toe and left foot.  Patient gets pedicures professionally done.  No issue on the other side.  History of ingrown toenail.  Would like a referral to podiatry.

## 2022-06-24 NOTE — HPI: SKIN LESION
SOB with elevated calcium score
Is This A New Presentation, Or A Follow-Up?: Skin Lesion
What Type Of Note Output Would You Prefer (Optional)?: Standard Output
How Severe Is Your Skin Lesion?: moderate
Has Your Skin Lesion Been Treated?: not been treated

## 2022-08-17 ENCOUNTER — TELEMEDICINE (OUTPATIENT)
Dept: MEDICAL GROUP | Facility: PHYSICIAN GROUP | Age: 52
End: 2022-08-17
Payer: COMMERCIAL

## 2022-08-17 VITALS — BODY MASS INDEX: 29.98 KG/M2 | HEIGHT: 67 IN | WEIGHT: 191 LBS

## 2022-08-17 DIAGNOSIS — E55.9 VITAMIN D DEFICIENCY: ICD-10-CM

## 2022-08-17 DIAGNOSIS — F41.9 ANXIETY: ICD-10-CM

## 2022-08-17 DIAGNOSIS — F41.0 PANIC ATTACKS: ICD-10-CM

## 2022-08-17 DIAGNOSIS — E03.4 HYPOTHYROIDISM DUE TO ACQUIRED ATROPHY OF THYROID: ICD-10-CM

## 2022-08-17 PROCEDURE — 99214 OFFICE O/P EST MOD 30 MIN: CPT | Mod: 95 | Performed by: NURSE PRACTITIONER

## 2022-08-17 RX ORDER — CITALOPRAM HYDROBROMIDE 10 MG/1
TABLET ORAL
Qty: 90 TABLET | Refills: 0 | Status: SHIPPED | OUTPATIENT
Start: 2022-08-17 | End: 2022-08-31

## 2022-08-17 ASSESSMENT — FIBROSIS 4 INDEX: FIB4 SCORE: 1.47

## 2022-08-17 NOTE — PROGRESS NOTES
Telemedicine Visit: Established Patient     Verbal consent was obtained. Patient's identity was verified.  This evaluation was conducted via Zoom using secure and encrypted videoconferencing technology.  The patient was presented by a medical professional at the originating site.   The patient's identity was confirmed and verbal consent was obtained for this telemedicine encounter.    The patient was in their home in the [facility]      Subjective:     Chief Complaint   Patient presents with    Follow-Up     Fv on Thyroid medication/ Did not get labs done/ having colonoscopy on Friday     Leigh Whitley is a 52 y.o. female presenting for evaluation and management of following problems:    Hypothyroidism  Chronic and stable.  Last thyroid panel within normal limits from May 2022.  Patient denies hyper or hyper thyroid symptoms, no palpitations, no anxiety, insomnia GI issues of weight issue.  She did however noted brittle nails.  Her dose was adjusted down to current levothyroxine 88 mcg.  She reports compliance.  Did not have a chance to get her labs done yet, planning tomorrow.    ROS   Denies any recent fevers or chills. No nausea or vomiting. No chest pains or shortness of breath.     Allergies   Allergen Reactions    Ultram [Tramadol Hcl] Itching       Current medicines (including changes today)  Current Outpatient Medications   Medication Sig Dispense Refill    levothyroxine (SYNTHROID) 88 MCG Tab Take 1 Tablet by mouth every morning on an empty stomach. 90 Tablet 1    citalopram (CELEXA) 20 MG Tab Take 2 Tablets by mouth every morning. 90 Each 3     No current facility-administered medications for this visit.       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  She  has a past surgical history that includes hysterectomy, total abdominal (2012); cholecystectomy (2002); appendectomy (2012); gastric banding laparoscopic (2008); tubal ligation; and laminotomy.       Objective:   Vitals obtained by patient:  Ht 1.702 m (5' 7\")   Wt 86.6 kg (191 lb)   BMI 29.91 kg/m²      Physical Exam:  General: No acute distress. Well nourished.   HEENT: EOM grossly intact, no scleral icterus, no pharyngeal erythema.   Neck:  No JVD noted at 90 degrees, trachea midline  CVS: Pulse as reported by patient, no visible LE edema.  Resp: Unlabored respiratory effort, no cough or audible wheeze  MSK/Ext: No clubbing or cyanosis visible appreciated.  Skin: No rashes in visible areas.  Neurological: AOx3. CN III-XII grossly intact. No focal deficits.     LABS: 5/2022 results reviewed and discussed with the patient, questions answered.    Assessment and Plan:   1. Hypothyroidism due to acquired atrophy of thyroid  Stable on current regimen.  Continue.  Refills given   - Lipid Profile; Future  - TSH; Future  - T3 FREE; Future  - REVERSE T3; Future  - FREE THYROXINE; Future  - VITAMIN B12; Future    2. Vitamin D deficiency  - VITAMIN D,25 HYDROXY; Future       Follow-up: No follow-ups on file. 3 wks          "

## 2022-08-31 ENCOUNTER — OFFICE VISIT (OUTPATIENT)
Dept: MEDICAL GROUP | Facility: PHYSICIAN GROUP | Age: 52
End: 2022-08-31
Payer: COMMERCIAL

## 2022-08-31 VITALS
WEIGHT: 191.4 LBS | HEIGHT: 67 IN | RESPIRATION RATE: 16 BRPM | TEMPERATURE: 98.3 F | SYSTOLIC BLOOD PRESSURE: 108 MMHG | OXYGEN SATURATION: 98 % | HEART RATE: 67 BPM | DIASTOLIC BLOOD PRESSURE: 68 MMHG | BODY MASS INDEX: 30.04 KG/M2

## 2022-08-31 DIAGNOSIS — H35.061 RETINAL VASCULITIS OF RIGHT EYE: ICD-10-CM

## 2022-08-31 DIAGNOSIS — H57.9 ABNORMAL SENSATION OF EYE: ICD-10-CM

## 2022-08-31 PROCEDURE — 99214 OFFICE O/P EST MOD 30 MIN: CPT | Performed by: NURSE PRACTITIONER

## 2022-08-31 RX ORDER — POLYETHYLENE GLYCOL-3350 AND ELECTROLYTES 236; 6.74; 5.86; 2.97; 22.74 G/274.31G; G/274.31G; G/274.31G; G/274.31G; G/274.31G
POWDER, FOR SOLUTION ORAL
COMMUNITY
Start: 2022-08-17 | End: 2022-08-31

## 2022-08-31 ASSESSMENT — FIBROSIS 4 INDEX: FIB4 SCORE: 1.47

## 2022-08-31 NOTE — ASSESSMENT & PLAN NOTE
"New problem.  Sensation in the left eye described as feeling a stye.  Patient is used to that feeling and did not pay attention unless friend mentioned \" what is wrong with you eye\".  Patient has history of retinal vasculitis in the right eye and was seen by neuro ophthalmology, Dr. Bhagat.  She was last seen in around 2018.   Patient describes black spots flashing in the lateral corner of her left eye for approximately 6 months in the evening in darker lighting.  Denies itching, pain, no blurriness.  "

## 2022-08-31 NOTE — PROGRESS NOTES
"Chief Complaint   Patient presents with    Eye Problem     L concerns of flap near tear duct; yellowing       HISTORY OF PRESENT ILLNESS: Patient is a 52 y.o. female, established patient who presents today to discuss medical problems as listed below:    Health Maintenance:  COMPLETED     Abnormal sensation of eye  New problem.  Sensation in the left eye described as feeling a stye.  Patient is used to that feeling and did not pay attention unless friend mentioned \" what is wrong with you eye\".  Patient has history of retinal vasculitis in the right eye and was seen by neuro ophthalmology, Dr. Bhagat.  She was last seen in around 2018.   Patient describes black spots flashing in the lateral corner of her left eye for approximately 6 months.  Denies itching, pain, no blurriness.    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 90 Tablet 1    citalopram (CELEXA) 20 MG Tab Take 2 Tablets by mouth every morning. 90 Each 3     No current facility-administered medications for this visit.       Social History     Tobacco Use    Smoking status: Never    Smokeless tobacco: " "Never   Vaping Use    Vaping Use: Never used   Substance Use Topics    Alcohol use: Yes     Comment: occ    Drug use: Yes     Types: Marijuana     Comment: Occasionally     Social History     Social History Narrative    Not on file       Family History   Problem Relation Age of Onset    Cancer Mother        Allergies, past medical history, past surgical history, family history, social history reviewed and updated.    Review of Systems:     - Constitutional: Negative for fever, chills, unexpected weight change, and fatigue/generalized weakness.     - EYEs: left eye skin tag    - Respiratory: Negative for cough, sputum production, chest congestion, dyspnea, wheezing, and crackles.      - Cardiovascular: Negative for chest pain, palpitations, orthopnea, and bilateral lower extremity edema.       - Psychiatric/Behavioral: Negative for depression, suicidal/homicidal ideation and memory loss.      All other systems reviewed and are negative    Exam:    /68 (BP Location: Right arm, Patient Position: Sitting, BP Cuff Size: Adult)   Pulse 67   Temp 36.8 °C (98.3 °F) (Temporal)   Resp 16   Ht 1.702 m (5' 7\")   Wt 86.8 kg (191 lb 6.4 oz)   SpO2 98%   BMI 29.98 kg/m²  Body mass index is 29.98 kg/m².    Physical Exam:  Constitutional: Well-developed and well-nourished. Not diaphoretic. No distress.   Eyes: small skin flap noted at the medial lower corner of Left eye. No redness, no injection,  no swelling. Conjunctivae and extraocular motions are normal. Pupils are equal, round, and reactive to light. No scleral icterus.   Cardiovascular: Regular rate and rhythm, S1 and S2 without murmur, rubs, or gallops.    Chest: Effort normal. Clear to auscultation throughout. No adventitious sounds. Neurological: Alert and oriented x 3.  Psychiatric:  Behavior, mood, and affect are appropriate.  MA/nursing note and vitals reviewed.    Assessment/Plan:  1. Retinal vasculitis of right eye  Optic skin tag. Pt needs a f/u and " evaluation by specialist. Referral placed under urgent priority. If symptoms worsen go to ED  - Referral to Neuro Ophthalmology  - Referral to Ophthalmology    2. Abnormal sensation of eye  - Referral to Neuro Ophthalmology  - Referral to Ophthalmology       Discussed with patient possible alternative diagnoses, pt is to take all medications as prescribed. If symptoms persist FU w/PCP, if symptoms worsen go to emergency room. If experiencing any side effects from prescribed medications reports to the office immediately or go to emergency room.  Reviewed indication, dosage, usage and potential adverse effects of prescribed medications. Reviewed risks and benefits of treatment plan. Patient verbalizes understanding of all instruction and verbally agrees to plan.    No follow-ups on file.

## 2022-09-09 ENCOUNTER — OFFICE VISIT (OUTPATIENT)
Dept: MEDICAL GROUP | Facility: PHYSICIAN GROUP | Age: 52
End: 2022-09-09
Payer: COMMERCIAL

## 2022-09-09 VITALS
WEIGHT: 194.8 LBS | OXYGEN SATURATION: 98 % | BODY MASS INDEX: 30.57 KG/M2 | RESPIRATION RATE: 16 BRPM | TEMPERATURE: 98.3 F | HEIGHT: 67 IN | SYSTOLIC BLOOD PRESSURE: 122 MMHG | HEART RATE: 64 BPM | DIASTOLIC BLOOD PRESSURE: 76 MMHG

## 2022-09-09 DIAGNOSIS — E55.9 VITAMIN D DEFICIENCY: ICD-10-CM

## 2022-09-09 DIAGNOSIS — H57.9 ABNORMAL SENSATION OF EYE: ICD-10-CM

## 2022-09-09 DIAGNOSIS — E78.2 MIXED HYPERLIPIDEMIA: ICD-10-CM

## 2022-09-09 DIAGNOSIS — R06.83 SNORING: ICD-10-CM

## 2022-09-09 PROCEDURE — 99214 OFFICE O/P EST MOD 30 MIN: CPT | Performed by: NURSE PRACTITIONER

## 2022-09-09 RX ORDER — ERGOCALCIFEROL 1.25 MG/1
50000 CAPSULE ORAL
Qty: 12 CAPSULE | Refills: 3 | Status: SHIPPED | OUTPATIENT
Start: 2022-09-09 | End: 2023-06-07 | Stop reason: SDUPTHER

## 2022-09-09 SDOH — ECONOMIC STABILITY: HOUSING INSECURITY
IN THE LAST 12 MONTHS, WAS THERE A TIME WHEN YOU DID NOT HAVE A STEADY PLACE TO SLEEP OR SLEPT IN A SHELTER (INCLUDING NOW)?: NO

## 2022-09-09 SDOH — ECONOMIC STABILITY: INCOME INSECURITY: IN THE LAST 12 MONTHS, WAS THERE A TIME WHEN YOU WERE NOT ABLE TO PAY THE MORTGAGE OR RENT ON TIME?: NO

## 2022-09-09 SDOH — ECONOMIC STABILITY: INCOME INSECURITY: HOW HARD IS IT FOR YOU TO PAY FOR THE VERY BASICS LIKE FOOD, HOUSING, MEDICAL CARE, AND HEATING?: NOT HARD AT ALL

## 2022-09-09 SDOH — ECONOMIC STABILITY: FOOD INSECURITY: WITHIN THE PAST 12 MONTHS, YOU WORRIED THAT YOUR FOOD WOULD RUN OUT BEFORE YOU GOT MONEY TO BUY MORE.: NEVER TRUE

## 2022-09-09 SDOH — ECONOMIC STABILITY: TRANSPORTATION INSECURITY
IN THE PAST 12 MONTHS, HAS THE LACK OF TRANSPORTATION KEPT YOU FROM MEDICAL APPOINTMENTS OR FROM GETTING MEDICATIONS?: NO

## 2022-09-09 SDOH — HEALTH STABILITY: PHYSICAL HEALTH: ON AVERAGE, HOW MANY DAYS PER WEEK DO YOU ENGAGE IN MODERATE TO STRENUOUS EXERCISE (LIKE A BRISK WALK)?: 3 DAYS

## 2022-09-09 SDOH — HEALTH STABILITY: PHYSICAL HEALTH: ON AVERAGE, HOW MANY MINUTES DO YOU ENGAGE IN EXERCISE AT THIS LEVEL?: 50 MIN

## 2022-09-09 SDOH — ECONOMIC STABILITY: TRANSPORTATION INSECURITY
IN THE PAST 12 MONTHS, HAS LACK OF RELIABLE TRANSPORTATION KEPT YOU FROM MEDICAL APPOINTMENTS, MEETINGS, WORK OR FROM GETTING THINGS NEEDED FOR DAILY LIVING?: NO

## 2022-09-09 SDOH — ECONOMIC STABILITY: HOUSING INSECURITY: IN THE LAST 12 MONTHS, HOW MANY PLACES HAVE YOU LIVED?: 1

## 2022-09-09 SDOH — ECONOMIC STABILITY: FOOD INSECURITY: WITHIN THE PAST 12 MONTHS, THE FOOD YOU BOUGHT JUST DIDN'T LAST AND YOU DIDN'T HAVE MONEY TO GET MORE.: NEVER TRUE

## 2022-09-09 SDOH — ECONOMIC STABILITY: TRANSPORTATION INSECURITY
IN THE PAST 12 MONTHS, HAS LACK OF TRANSPORTATION KEPT YOU FROM MEETINGS, WORK, OR FROM GETTING THINGS NEEDED FOR DAILY LIVING?: NO

## 2022-09-09 SDOH — HEALTH STABILITY: MENTAL HEALTH
STRESS IS WHEN SOMEONE FEELS TENSE, NERVOUS, ANXIOUS, OR CAN'T SLEEP AT NIGHT BECAUSE THEIR MIND IS TROUBLED. HOW STRESSED ARE YOU?: ONLY A LITTLE

## 2022-09-09 ASSESSMENT — SOCIAL DETERMINANTS OF HEALTH (SDOH)
DO YOU BELONG TO ANY CLUBS OR ORGANIZATIONS SUCH AS CHURCH GROUPS UNIONS, FRATERNAL OR ATHLETIC GROUPS, OR SCHOOL GROUPS?: YES
WITHIN THE PAST 12 MONTHS, YOU WORRIED THAT YOUR FOOD WOULD RUN OUT BEFORE YOU GOT THE MONEY TO BUY MORE: NEVER TRUE
HOW OFTEN DO YOU GET TOGETHER WITH FRIENDS OR RELATIVES?: MORE THAN THREE TIMES A WEEK
HOW MANY DRINKS CONTAINING ALCOHOL DO YOU HAVE ON A TYPICAL DAY WHEN YOU ARE DRINKING: 1 OR 2
HOW OFTEN DO YOU ATTEND CHURCH OR RELIGIOUS SERVICES?: MORE THAN 4 TIMES PER YEAR
HOW OFTEN DO YOU ATTENT MEETINGS OF THE CLUB OR ORGANIZATION YOU BELONG TO?: MORE THAN 4 TIMES PER YEAR
ARE YOU MARRIED, WIDOWED, DIVORCED, SEPARATED, NEVER MARRIED, OR LIVING WITH A PARTNER?: LIVING WITH PARTNER
HOW OFTEN DO YOU ATTENT MEETINGS OF THE CLUB OR ORGANIZATION YOU BELONG TO?: MORE THAN 4 TIMES PER YEAR
HOW HARD IS IT FOR YOU TO PAY FOR THE VERY BASICS LIKE FOOD, HOUSING, MEDICAL CARE, AND HEATING?: NOT HARD AT ALL
HOW OFTEN DO YOU GET TOGETHER WITH FRIENDS OR RELATIVES?: MORE THAN THREE TIMES A WEEK
IN A TYPICAL WEEK, HOW MANY TIMES DO YOU TALK ON THE PHONE WITH FAMILY, FRIENDS, OR NEIGHBORS?: MORE THAN THREE TIMES A WEEK
IN A TYPICAL WEEK, HOW MANY TIMES DO YOU TALK ON THE PHONE WITH FAMILY, FRIENDS, OR NEIGHBORS?: MORE THAN THREE TIMES A WEEK
HOW OFTEN DO YOU HAVE A DRINK CONTAINING ALCOHOL: 2-3 TIMES A WEEK
ARE YOU MARRIED, WIDOWED, DIVORCED, SEPARATED, NEVER MARRIED, OR LIVING WITH A PARTNER?: LIVING WITH PARTNER
HOW OFTEN DO YOU ATTEND CHURCH OR RELIGIOUS SERVICES?: MORE THAN 4 TIMES PER YEAR
HOW OFTEN DO YOU HAVE SIX OR MORE DRINKS ON ONE OCCASION: NEVER
DO YOU BELONG TO ANY CLUBS OR ORGANIZATIONS SUCH AS CHURCH GROUPS UNIONS, FRATERNAL OR ATHLETIC GROUPS, OR SCHOOL GROUPS?: YES

## 2022-09-09 ASSESSMENT — FIBROSIS 4 INDEX: FIB4 SCORE: 1.47

## 2022-09-09 ASSESSMENT — LIFESTYLE VARIABLES
HOW MANY STANDARD DRINKS CONTAINING ALCOHOL DO YOU HAVE ON A TYPICAL DAY: 1 OR 2
HOW OFTEN DO YOU HAVE SIX OR MORE DRINKS ON ONE OCCASION: NEVER
SKIP TO QUESTIONS 9-10: 1
AUDIT-C TOTAL SCORE: 3
HOW OFTEN DO YOU HAVE A DRINK CONTAINING ALCOHOL: 2-3 TIMES A WEEK

## 2022-09-09 NOTE — ASSESSMENT & PLAN NOTE
Labs from 8/31/2022 with total cholesterol 275, triglycerides 174, HDL 46 and LDL at 196.  10 yr cardiac risk 5.72%

## 2022-09-09 NOTE — ASSESSMENT & PLAN NOTE
Patient is following up after seeing [facility] Dr. Hema Glez.  Dr. Glez would like patient to be evaluated for sleep apnea active oxygen deficiency.  She satting at 98% today.  She does admit to snoring and daytime fatigue.  Will order sleep study under urgent priority.

## 2022-09-09 NOTE — PROGRESS NOTES
Chief Complaint   Patient presents with    Gynecologic Exam    Other     Dr. Hema Glez requests Physical, Testing, Sleep Study       HISTORY OF PRESENT ILLNESS: Patient is a 52 y.o. female, established patient who presents today to discuss medical problems as listed below:    Health Maintenance:  COMPLETED     Snoring  Patient reports chronic snoring and daytime fatigue.      Abnormal sensation of eye  Patient is following up after seeing [facility] Dr. Hema Glez.  Dr. Glez would like patient to be evaluated for sleep apnea active oxygen deficiency.  She satting at 98% today.  She does admit to snoring and daytime fatigue.  Will order sleep study under urgent priority.    Mixed hyperlipidemia  Labs from 8/31/2022 with total cholesterol 275, triglycerides 174, HDL 46 and LDL at 196.  10 yr cardiac risk 5.72%    Vitamin D deficiency  Vit d 33, labs from 8/31/22    Patient Active Problem List    Diagnosis Date Noted    Snoring 09/09/2022    Vitamin D deficiency 09/09/2022    Abnormal sensation of eye 08/31/2022    Onychocryptosis 05/27/2022    Mixed hyperlipidemia 07/01/2021    History of stroke 05/14/2021    Panic attacks 05/14/2021    Severe episode of recurrent major depressive disorder, without psychotic features ([facility]) 01/29/2020    ADD (attention deficit disorder) without hyperactivity 02/09/2018    Status post bariatric surgery 02/09/2018    GERD (gastroesophageal reflux disease) 02/09/2018    Environmental allergies 08/30/2017    Obesity (BMI 30-39.9) 08/30/2017    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 12 Capsule 3    levothyroxine (SYNTHROID) 88 MCG Tab Take 1 Tablet by  "mouth every morning on an empty stomach. 90 Tablet 1    citalopram (CELEXA) 20 MG Tab Take 2 Tablets by mouth every morning. 90 Each 3     No current facility-administered medications for this visit.       Social History     Tobacco Use    Smoking status: Never    Smokeless tobacco: Never   Vaping Use    Vaping Use: Never used   Substance Use Topics    Alcohol use: Yes     Comment: occ    Drug use: Yes     Types: Marijuana     Comment: Occasionally     Social History     Social History Narrative    Not on file       Family History   Problem Relation Age of Onset    Cancer Mother        Allergies, past medical history, past surgical history, family history, social history reviewed and updated.    Review of Systems:     - Constitutional: Negative for fever, chills, unexpected weight change, and fatigue/generalized weakness.      - Respiratory: Negative for cough, sputum production, chest congestion, dyspnea, wheezing, and crackles.      - Cardiovascular: Negative for chest pain, palpitations, orthopnea, and bilateral lower extremity edema.    - Psychiatric/Behavioral: Negative for depression, suicidal/homicidal ideation and memory loss.      All other systems reviewed and are negative    Exam:    /76 (BP Location: Right arm, Patient Position: Sitting, BP Cuff Size: Adult)   Pulse 64   Temp 36.8 °C (98.3 °F) (Temporal)   Resp 16   Ht 1.702 m (5' 7\")   Wt 88.4 kg (194 lb 12.8 oz)   SpO2 98%   BMI 30.51 kg/m²  Body mass index is 30.51 kg/m².    Physical Exam:  Constitutional: Well-developed and well-nourished. Not diaphoretic. No distress.   Cardiovascular: Regular rate and rhythm, S1 and S2 without murmur, rubs, or gallops.    Chest: Effort normal. Clear to auscultation throughout. No adventitious sounds. Neurological: Alert and oriented x 3.   Psychiatric:  Behavior, mood, and affect are appropriate.  MA/nursing note and vitals reviewed.    LABS: 8/2022 results reviewed and discussed with the patient, " questions answered.    Assessment/Plan:  1. Snoring  - Referral to Pulmonary and Sleep Medicine    2. Abnormal sensation of eye  Will obtain records from Dr. Glez. Sleep study ordered under urgent priority  - Referral to Pulmonary and Sleep Medicine    3. Mixed hyperlipidemia  Uncontrolled, stable. Discussed etiology and potential risk factors. Discussed 10-year ASCVD risk. Educated on healthy lifestyle including nutrition and exercise.  Avoid excessive red meat and simple carbohydrates.  Increase fiber intake to 25-30 g daily if tolerated and take fish oil 2 g nightly.  Advised low saturated fat, low carbohydrate diet.  Quit smoking if you do. Maintain adequate hydration. Advise daily exercise 45-60 mins per tolerance and preference.   - Lipid Profile; Future    4. Vitamin D deficiency  - vitamin D2, Ergocalciferol, (DRISDOL) 1.25 MG (35153 UT) Cap capsule; Take 1 Capsule by mouth every 7 days.  Dispense: 12 Capsule; Refill: 3       Discussed with patient possible alternative diagnoses, pt is to take all medications as prescribed. If symptoms persist FU w/PCP, if symptoms worsen go to emergency room. If experiencing any side effects from prescribed medications reports to the office immediately or go to emergency room.  Reviewed indication, dosage, usage and potential adverse effects of prescribed medications. Reviewed risks and benefits of treatment plan. Patient verbalizes understanding of all instruction and verbally agrees to plan.    No follow-ups on file. 3 mos for cholesterol

## 2022-11-09 ENCOUNTER — TELEMEDICINE (OUTPATIENT)
Dept: MEDICAL GROUP | Facility: PHYSICIAN GROUP | Age: 52
End: 2022-11-09
Payer: COMMERCIAL

## 2022-11-09 VITALS — BODY MASS INDEX: 29.82 KG/M2 | HEIGHT: 67 IN | WEIGHT: 190 LBS

## 2022-11-09 DIAGNOSIS — G47.33 OBSTRUCTIVE SLEEP APNEA SYNDROME: ICD-10-CM

## 2022-11-09 DIAGNOSIS — J35.1 LARGE TONSILS: ICD-10-CM

## 2022-11-09 PROCEDURE — 99214 OFFICE O/P EST MOD 30 MIN: CPT | Mod: 95 | Performed by: NURSE PRACTITIONER

## 2022-11-09 ASSESSMENT — FIBROSIS 4 INDEX: FIB4 SCORE: 1.47

## 2022-11-09 NOTE — ASSESSMENT & PLAN NOTE
Patient is here to discuss sleep study results.  She was diagnosed with sleep apnea of mild severity.  Advising CPAP for 2 weeks weeks, come back to reassess for tolerance.  If not tolerating CPAP dental application.  Patient was advised to avoid EtOH cigarette.  Weight loss if applicable.  She has history of snoring.  Diagnosed with CPAP years ago, lost 100 pounds since.  Previously tried CPAP but did not tolerate.  Reports history of large tonsils.  She is also stomach sleeper.  Would like referral to ENT and pulmonology.  Patient is also seeing dentist, will require about dental wear for sleep apnea.

## 2022-11-09 NOTE — PROGRESS NOTES
Virtual Visit: Established Patient   This visit was conducted via Zoom using secure and encrypted videoconferencing technology.   The patient was in their home in the [facility].    The patient's identity was confirmed and verbal consent was obtained for this virtual visit.     Subjective:   CC:   Chief Complaint   Patient presents with    Results     Sleep study        Leigh Whitley is a 52 y.o. female presenting for evaluation and management of:    Obstructive sleep apnea syndrome  Patient is here to discuss sleep study results.  She was diagnosed with sleep apnea of mild severity.  Advising CPAP for 2 weeks weeks, come back to reassess for tolerance.  If not tolerating CPAP dental application.  Patient was advised to avoid EtOH cigarette.  Weight loss if applicable.  She has history of snoring.  Diagnosed with CPAP years ago, lost 100 pounds since.  Previously tried CPAP but did not tolerate.  Reports history of large tonsils.  She is also stomach sleeper.  Would like referral to ENT and pulmonology.  Patient is also seeing dentist, will require about dental wear for sleep apnea.       ROS     Current medicines (including changes today)  Current Outpatient Medications   Medication Sig Dispense Refill    vitamin D2, Ergocalciferol, (DRISDOL) 1.25 MG (24080 UT) Cap capsule Take 1 Capsule by mouth every 7 days. 12 Capsule 3    levothyroxine (SYNTHROID) 88 MCG Tab Take 1 Tablet by mouth every morning on an empty stomach. 90 Tablet 1    citalopram (CELEXA) 20 MG Tab Take 2 Tablets by mouth every morning. 90 Each 3     No current facility-administered medications for this visit.       Patient Active Problem List    Diagnosis Date Noted    Obstructive sleep apnea syndrome 11/09/2022    Snoring 09/09/2022    Vitamin D deficiency 09/09/2022    Abnormal sensation of eye 08/31/2022    Onychocryptosis 05/27/2022    Mixed hyperlipidemia 07/01/2021    History of stroke 05/14/2021    Panic attacks 05/14/2021     "Severe episode of recurrent major depressive disorder, without psychotic features ([facility]) 01/29/2020    ADD (attention deficit disorder) without hyperactivity 02/09/2018    Status post bariatric surgery 02/09/2018    GERD (gastroesophageal reflux disease) 02/09/2018    Environmental allergies 08/30/2017    Obesity (BMI 30-39.9) 08/30/2017    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  Skin: No rashes in visible areas.  Eye: Round. Conjunctiva clear, lids normal. No icterus.   ENMT: Lips pink without lesions, good dentition, moist mucous membranes. Phonation normal.  Neck: No masses, no thyromegaly. Moves freely without pain.  Respiratory: Unlabored respiratory effort, no cough or audible wheeze  Psych: Alert and oriented x3, normal affect and mood.     Assessment and Plan:   The following treatment plan was discussed:   1. Obstructive sleep apnea syndrome  Discussed etiology and potential risk factors.  - Referral to Pulmonary and Sleep Medicine  - Referral to ENT    2. Large tonsils  - Referral to ENT       Discussed with patient possible alternative diagnoses, patient is to take all medications as prescribed.      If symptoms persist FU w/PCP, if symptoms worsen go to emergency room.      If experiencing any side effects from prescribed medications report to the office immediately or go to emergency room.     Reviewed indication, dosage, usage and potential adverse effects of prescribed medications.      Reviewed risks and benefits of treatment plan. Patient verbalizes understanding of all instruction and verbally agrees to plan.     Discussed plan with the patient, and patient agrees to the above.      I personally reviewed prior external notes and test results " pertinent to today's visit.     Follow-up: annually and PRN

## 2022-11-14 ENCOUNTER — TELEPHONE (OUTPATIENT)
Dept: HEALTH INFORMATION MANAGEMENT | Facility: OTHER | Age: 52
End: 2022-11-14
Payer: COMMERCIAL

## 2022-11-18 DIAGNOSIS — E03.4 HYPOTHYROIDISM DUE TO ACQUIRED ATROPHY OF THYROID: ICD-10-CM

## 2022-11-18 RX ORDER — LEVOTHYROXINE SODIUM 0.1 MG/1
100 TABLET ORAL
Qty: 90 TABLET | Refills: 0 | Status: SHIPPED | OUTPATIENT
Start: 2022-11-18 | End: 2023-01-26

## 2023-02-14 ENCOUNTER — PRE-ADMISSION TESTING (OUTPATIENT)
Dept: ADMISSIONS | Facility: MEDICAL CENTER | Age: 53
End: 2023-02-14
Attending: COLON & RECTAL SURGERY
Payer: COMMERCIAL

## 2023-02-14 DIAGNOSIS — Z01.812 PRE-OPERATIVE LABORATORY EXAMINATION: ICD-10-CM

## 2023-02-14 DIAGNOSIS — Z01.810 PRE-OPERATIVE CARDIOVASCULAR EXAMINATION: ICD-10-CM

## 2023-02-14 LAB
ALBUMIN SERPL BCP-MCNC: 4.1 G/DL (ref 3.2–4.9)
ALBUMIN/GLOB SERPL: 1.5 G/DL
ALP SERPL-CCNC: 70 U/L (ref 30–99)
ALT SERPL-CCNC: 16 U/L (ref 2–50)
ANION GAP SERPL CALC-SCNC: 9 MMOL/L (ref 7–16)
AST SERPL-CCNC: 15 U/L (ref 12–45)
BASOPHILS # BLD AUTO: 1 % (ref 0–1.8)
BASOPHILS # BLD: 0.04 K/UL (ref 0–0.12)
BILIRUB SERPL-MCNC: 0.5 MG/DL (ref 0.1–1.5)
BUN SERPL-MCNC: 13 MG/DL (ref 8–22)
CALCIUM ALBUM COR SERPL-MCNC: 9.1 MG/DL (ref 8.5–10.5)
CALCIUM SERPL-MCNC: 9.2 MG/DL (ref 8.5–10.5)
CHLORIDE SERPL-SCNC: 105 MMOL/L (ref 96–112)
CO2 SERPL-SCNC: 26 MMOL/L (ref 20–33)
CREAT SERPL-MCNC: 0.75 MG/DL (ref 0.5–1.4)
EKG IMPRESSION: NORMAL
EOSINOPHIL # BLD AUTO: 0.08 K/UL (ref 0–0.51)
EOSINOPHIL NFR BLD: 2 % (ref 0–6.9)
ERYTHROCYTE [DISTWIDTH] IN BLOOD BY AUTOMATED COUNT: 42.5 FL (ref 35.9–50)
GFR SERPLBLD CREATININE-BSD FMLA CKD-EPI: 96 ML/MIN/1.73 M 2
GLOBULIN SER CALC-MCNC: 2.7 G/DL (ref 1.9–3.5)
GLUCOSE SERPL-MCNC: 90 MG/DL (ref 65–99)
HCT VFR BLD AUTO: 40 % (ref 37–47)
HGB BLD-MCNC: 13.6 G/DL (ref 12–16)
IMM GRANULOCYTES # BLD AUTO: 0.01 K/UL (ref 0–0.11)
IMM GRANULOCYTES NFR BLD AUTO: 0.3 % (ref 0–0.9)
INR PPP: 0.89 (ref 0.87–1.13)
LYMPHOCYTES # BLD AUTO: 1.4 K/UL (ref 1–4.8)
LYMPHOCYTES NFR BLD: 35.4 % (ref 22–41)
MCH RBC QN AUTO: 32 PG (ref 27–33)
MCHC RBC AUTO-ENTMCNC: 34 G/DL (ref 33.6–35)
MCV RBC AUTO: 94.1 FL (ref 81.4–97.8)
MONOCYTES # BLD AUTO: 0.34 K/UL (ref 0–0.85)
MONOCYTES NFR BLD AUTO: 8.6 % (ref 0–13.4)
NEUTROPHILS # BLD AUTO: 2.09 K/UL (ref 2–7.15)
NEUTROPHILS NFR BLD: 52.7 % (ref 44–72)
NRBC # BLD AUTO: 0 K/UL
NRBC BLD-RTO: 0 /100 WBC
PLATELET # BLD AUTO: 206 K/UL (ref 164–446)
PMV BLD AUTO: 10.5 FL (ref 9–12.9)
POTASSIUM SERPL-SCNC: 4.5 MMOL/L (ref 3.6–5.5)
PROT SERPL-MCNC: 6.8 G/DL (ref 6–8.2)
PROTHROMBIN TIME: 12 SEC (ref 12–14.6)
RBC # BLD AUTO: 4.25 M/UL (ref 4.2–5.4)
SODIUM SERPL-SCNC: 140 MMOL/L (ref 135–145)
WBC # BLD AUTO: 4 K/UL (ref 4.8–10.8)

## 2023-02-14 PROCEDURE — 36415 COLL VENOUS BLD VENIPUNCTURE: CPT

## 2023-02-14 PROCEDURE — 85025 COMPLETE CBC W/AUTO DIFF WBC: CPT

## 2023-02-14 PROCEDURE — 85610 PROTHROMBIN TIME: CPT

## 2023-02-14 PROCEDURE — 93005 ELECTROCARDIOGRAM TRACING: CPT

## 2023-02-14 PROCEDURE — 93010 ELECTROCARDIOGRAM REPORT: CPT | Performed by: INTERNAL MEDICINE

## 2023-02-14 PROCEDURE — 80053 COMPREHEN METABOLIC PANEL: CPT

## 2023-02-14 ASSESSMENT — FIBROSIS 4 INDEX: FIB4 SCORE: 1.47

## 2023-02-21 ENCOUNTER — ANESTHESIA EVENT (OUTPATIENT)
Dept: SURGERY | Facility: MEDICAL CENTER | Age: 53
End: 2023-02-21
Payer: COMMERCIAL

## 2023-02-22 ENCOUNTER — HOSPITAL ENCOUNTER (OUTPATIENT)
Facility: MEDICAL CENTER | Age: 53
End: 2023-02-22
Attending: COLON & RECTAL SURGERY | Admitting: COLON & RECTAL SURGERY
Payer: COMMERCIAL

## 2023-02-22 ENCOUNTER — ANESTHESIA (OUTPATIENT)
Dept: SURGERY | Facility: MEDICAL CENTER | Age: 53
End: 2023-02-22
Payer: COMMERCIAL

## 2023-02-22 VITALS
OXYGEN SATURATION: 95 % | BODY MASS INDEX: 30.14 KG/M2 | RESPIRATION RATE: 16 BRPM | SYSTOLIC BLOOD PRESSURE: 112 MMHG | TEMPERATURE: 96.6 F | HEART RATE: 58 BPM | DIASTOLIC BLOOD PRESSURE: 53 MMHG | WEIGHT: 192.02 LBS | HEIGHT: 67 IN

## 2023-02-22 PROCEDURE — 00790 ANES IPER UPR ABD NOS: CPT | Performed by: ANESTHESIOLOGY

## 2023-02-22 PROCEDURE — 700111 HCHG RX REV CODE 636 W/ 250 OVERRIDE (IP): Performed by: ANESTHESIOLOGY

## 2023-02-22 PROCEDURE — 700101 HCHG RX REV CODE 250: Performed by: ANESTHESIOLOGY

## 2023-02-22 PROCEDURE — 700111 HCHG RX REV CODE 636 W/ 250 OVERRIDE (IP): Performed by: COLON & RECTAL SURGERY

## 2023-02-22 PROCEDURE — A6010 COLLAGEN BASED WOUND FILLER: HCPCS | Performed by: COLON & RECTAL SURGERY

## 2023-02-22 PROCEDURE — 160046 HCHG PACU - 1ST 60 MINS PHASE II: Performed by: COLON & RECTAL SURGERY

## 2023-02-22 PROCEDURE — 160036 HCHG PACU - EA ADDL 30 MINS PHASE I: Performed by: COLON & RECTAL SURGERY

## 2023-02-22 PROCEDURE — A9270 NON-COVERED ITEM OR SERVICE: HCPCS | Performed by: COLON & RECTAL SURGERY

## 2023-02-22 PROCEDURE — 700101 HCHG RX REV CODE 250: Performed by: COLON & RECTAL SURGERY

## 2023-02-22 PROCEDURE — 700102 HCHG RX REV CODE 250 W/ 637 OVERRIDE(OP): Performed by: ANESTHESIOLOGY

## 2023-02-22 PROCEDURE — 160025 RECOVERY II MINUTES (STATS): Performed by: COLON & RECTAL SURGERY

## 2023-02-22 PROCEDURE — 160002 HCHG RECOVERY MINUTES (STAT): Performed by: COLON & RECTAL SURGERY

## 2023-02-22 PROCEDURE — 160048 HCHG OR STATISTICAL LEVEL 1-5: Performed by: COLON & RECTAL SURGERY

## 2023-02-22 PROCEDURE — 160035 HCHG PACU - 1ST 60 MINS PHASE I: Performed by: COLON & RECTAL SURGERY

## 2023-02-22 PROCEDURE — A9270 NON-COVERED ITEM OR SERVICE: HCPCS | Performed by: ANESTHESIOLOGY

## 2023-02-22 PROCEDURE — 160009 HCHG ANES TIME/MIN: Performed by: COLON & RECTAL SURGERY

## 2023-02-22 PROCEDURE — 160029 HCHG SURGERY MINUTES - 1ST 30 MINS LEVEL 4: Performed by: COLON & RECTAL SURGERY

## 2023-02-22 PROCEDURE — 700102 HCHG RX REV CODE 250 W/ 637 OVERRIDE(OP): Performed by: COLON & RECTAL SURGERY

## 2023-02-22 PROCEDURE — 700105 HCHG RX REV CODE 258: Performed by: COLON & RECTAL SURGERY

## 2023-02-22 PROCEDURE — 700105 HCHG RX REV CODE 258: Performed by: ANESTHESIOLOGY

## 2023-02-22 PROCEDURE — 160041 HCHG SURGERY MINUTES - EA ADDL 1 MIN LEVEL 4: Performed by: COLON & RECTAL SURGERY

## 2023-02-22 PROCEDURE — 502000 HCHG MISC OR IMPLANTS RC 0278: Performed by: COLON & RECTAL SURGERY

## 2023-02-22 RX ORDER — ONDANSETRON 2 MG/ML
4 INJECTION INTRAMUSCULAR; INTRAVENOUS
Status: COMPLETED | OUTPATIENT
Start: 2023-02-22 | End: 2023-02-22

## 2023-02-22 RX ORDER — DEXMEDETOMIDINE HYDROCHLORIDE 100 UG/ML
INJECTION, SOLUTION INTRAVENOUS PRN
Status: DISCONTINUED | OUTPATIENT
Start: 2023-02-22 | End: 2023-02-22 | Stop reason: SURG

## 2023-02-22 RX ORDER — SODIUM CHLORIDE, SODIUM LACTATE, POTASSIUM CHLORIDE, CALCIUM CHLORIDE 600; 310; 30; 20 MG/100ML; MG/100ML; MG/100ML; MG/100ML
INJECTION, SOLUTION INTRAVENOUS CONTINUOUS
Status: DISCONTINUED | OUTPATIENT
Start: 2023-02-22 | End: 2023-02-22 | Stop reason: HOSPADM

## 2023-02-22 RX ORDER — HALOPERIDOL 5 MG/ML
1 INJECTION INTRAMUSCULAR
Status: DISCONTINUED | OUTPATIENT
Start: 2023-02-22 | End: 2023-02-22 | Stop reason: HOSPADM

## 2023-02-22 RX ORDER — GABAPENTIN 300 MG/1
300 CAPSULE ORAL ONCE
Status: COMPLETED | OUTPATIENT
Start: 2023-02-22 | End: 2023-02-22

## 2023-02-22 RX ORDER — DIPHENHYDRAMINE HYDROCHLORIDE 50 MG/ML
12.5 INJECTION INTRAMUSCULAR; INTRAVENOUS
Status: DISCONTINUED | OUTPATIENT
Start: 2023-02-22 | End: 2023-02-22 | Stop reason: HOSPADM

## 2023-02-22 RX ORDER — ACETAMINOPHEN 10 MG/ML
1 INJECTION, SOLUTION INTRAVENOUS ONCE
Status: COMPLETED | OUTPATIENT
Start: 2023-02-22 | End: 2023-02-22

## 2023-02-22 RX ORDER — OXYCODONE HCL 10 MG/1
10 TABLET, FILM COATED, EXTENDED RELEASE ORAL ONCE
Status: COMPLETED | OUTPATIENT
Start: 2023-02-22 | End: 2023-02-22

## 2023-02-22 RX ORDER — LIDOCAINE HYDROCHLORIDE 20 MG/ML
INJECTION, SOLUTION EPIDURAL; INFILTRATION; INTRACAUDAL; PERINEURAL PRN
Status: DISCONTINUED | OUTPATIENT
Start: 2023-02-22 | End: 2023-02-22 | Stop reason: SURG

## 2023-02-22 RX ORDER — IPRATROPIUM BROMIDE AND ALBUTEROL SULFATE 2.5; .5 MG/3ML; MG/3ML
3 SOLUTION RESPIRATORY (INHALATION)
Status: DISCONTINUED | OUTPATIENT
Start: 2023-02-22 | End: 2023-02-22 | Stop reason: HOSPADM

## 2023-02-22 RX ORDER — HYDROMORPHONE HYDROCHLORIDE 1 MG/ML
0.2 INJECTION, SOLUTION INTRAMUSCULAR; INTRAVENOUS; SUBCUTANEOUS
Status: DISCONTINUED | OUTPATIENT
Start: 2023-02-22 | End: 2023-02-22 | Stop reason: HOSPADM

## 2023-02-22 RX ORDER — HYDROMORPHONE HYDROCHLORIDE 2 MG/ML
INJECTION, SOLUTION INTRAMUSCULAR; INTRAVENOUS; SUBCUTANEOUS PRN
Status: DISCONTINUED | OUTPATIENT
Start: 2023-02-22 | End: 2023-02-22 | Stop reason: SURG

## 2023-02-22 RX ORDER — CEFAZOLIN SODIUM 1 G/3ML
INJECTION, POWDER, FOR SOLUTION INTRAMUSCULAR; INTRAVENOUS PRN
Status: DISCONTINUED | OUTPATIENT
Start: 2023-02-22 | End: 2023-02-22 | Stop reason: SURG

## 2023-02-22 RX ORDER — OXYCODONE HCL 5 MG/5 ML
10 SOLUTION, ORAL ORAL
Status: COMPLETED | OUTPATIENT
Start: 2023-02-22 | End: 2023-02-22

## 2023-02-22 RX ORDER — HYDROMORPHONE HYDROCHLORIDE 1 MG/ML
0.1 INJECTION, SOLUTION INTRAMUSCULAR; INTRAVENOUS; SUBCUTANEOUS
Status: DISCONTINUED | OUTPATIENT
Start: 2023-02-22 | End: 2023-02-22 | Stop reason: HOSPADM

## 2023-02-22 RX ORDER — BUPIVACAINE HYDROCHLORIDE AND EPINEPHRINE 5; 5 MG/ML; UG/ML
INJECTION, SOLUTION PERINEURAL
Status: DISCONTINUED | OUTPATIENT
Start: 2023-02-22 | End: 2023-02-22 | Stop reason: HOSPADM

## 2023-02-22 RX ORDER — HYDROMORPHONE HYDROCHLORIDE 1 MG/ML
0.4 INJECTION, SOLUTION INTRAMUSCULAR; INTRAVENOUS; SUBCUTANEOUS
Status: DISCONTINUED | OUTPATIENT
Start: 2023-02-22 | End: 2023-02-22 | Stop reason: HOSPADM

## 2023-02-22 RX ORDER — MEPERIDINE HYDROCHLORIDE 25 MG/ML
12.5 INJECTION INTRAMUSCULAR; INTRAVENOUS; SUBCUTANEOUS
Status: DISCONTINUED | OUTPATIENT
Start: 2023-02-22 | End: 2023-02-22 | Stop reason: HOSPADM

## 2023-02-22 RX ORDER — ONDANSETRON 2 MG/ML
INJECTION INTRAMUSCULAR; INTRAVENOUS PRN
Status: DISCONTINUED | OUTPATIENT
Start: 2023-02-22 | End: 2023-02-22 | Stop reason: SURG

## 2023-02-22 RX ORDER — SCOLOPAMINE TRANSDERMAL SYSTEM 1 MG/1
1 PATCH, EXTENDED RELEASE TRANSDERMAL ONCE
Status: DISCONTINUED | OUTPATIENT
Start: 2023-02-22 | End: 2023-02-22 | Stop reason: HOSPADM

## 2023-02-22 RX ORDER — OXYCODONE HCL 5 MG/5 ML
5 SOLUTION, ORAL ORAL
Status: COMPLETED | OUTPATIENT
Start: 2023-02-22 | End: 2023-02-22

## 2023-02-22 RX ORDER — SODIUM CHLORIDE, SODIUM LACTATE, POTASSIUM CHLORIDE, CALCIUM CHLORIDE 600; 310; 30; 20 MG/100ML; MG/100ML; MG/100ML; MG/100ML
INJECTION, SOLUTION INTRAVENOUS
Status: DISCONTINUED | OUTPATIENT
Start: 2023-02-22 | End: 2023-02-22 | Stop reason: SURG

## 2023-02-22 RX ORDER — MIDAZOLAM HYDROCHLORIDE 1 MG/ML
INJECTION INTRAMUSCULAR; INTRAVENOUS PRN
Status: DISCONTINUED | OUTPATIENT
Start: 2023-02-22 | End: 2023-02-22 | Stop reason: SURG

## 2023-02-22 RX ORDER — HYDRALAZINE HYDROCHLORIDE 20 MG/ML
5 INJECTION INTRAMUSCULAR; INTRAVENOUS
Status: DISCONTINUED | OUTPATIENT
Start: 2023-02-22 | End: 2023-02-22 | Stop reason: HOSPADM

## 2023-02-22 RX ADMIN — FENTANYL CITRATE 50 MCG: 50 INJECTION, SOLUTION INTRAMUSCULAR; INTRAVENOUS at 12:10

## 2023-02-22 RX ADMIN — ROCURONIUM BROMIDE 50 MG: 10 INJECTION, SOLUTION INTRAVENOUS at 10:49

## 2023-02-22 RX ADMIN — CEFAZOLIN 2 G: 330 INJECTION, POWDER, FOR SOLUTION INTRAMUSCULAR; INTRAVENOUS at 10:49

## 2023-02-22 RX ADMIN — SODIUM CHLORIDE, POTASSIUM CHLORIDE, SODIUM LACTATE AND CALCIUM CHLORIDE: 600; 310; 30; 20 INJECTION, SOLUTION INTRAVENOUS at 10:44

## 2023-02-22 RX ADMIN — ONDANSETRON 4 MG: 2 INJECTION INTRAMUSCULAR; INTRAVENOUS at 11:34

## 2023-02-22 RX ADMIN — FENTANYL CITRATE 50 MCG: 50 INJECTION, SOLUTION INTRAMUSCULAR; INTRAVENOUS at 11:00

## 2023-02-22 RX ADMIN — SUGAMMADEX 200 MG: 100 INJECTION, SOLUTION INTRAVENOUS at 11:28

## 2023-02-22 RX ADMIN — ACETAMINOPHEN 1 G: 10 INJECTION INTRAVENOUS at 11:45

## 2023-02-22 RX ADMIN — LIDOCAINE HYDROCHLORIDE 80 MG: 20 INJECTION, SOLUTION EPIDURAL; INFILTRATION; INTRACAUDAL at 10:49

## 2023-02-22 RX ADMIN — MIDAZOLAM HYDROCHLORIDE 2 MG: 1 INJECTION, SOLUTION INTRAMUSCULAR; INTRAVENOUS at 10:46

## 2023-02-22 RX ADMIN — SCOPOLAMINE 1 PATCH: 1.5 PATCH, EXTENDED RELEASE TRANSDERMAL at 10:23

## 2023-02-22 RX ADMIN — SODIUM CHLORIDE, POTASSIUM CHLORIDE, SODIUM LACTATE AND CALCIUM CHLORIDE: 600; 310; 30; 20 INJECTION, SOLUTION INTRAVENOUS at 10:36

## 2023-02-22 RX ADMIN — GABAPENTIN 300 MG: 300 CAPSULE ORAL at 10:23

## 2023-02-22 RX ADMIN — DEXMEDETOMIDINE 20 MCG: 200 INJECTION, SOLUTION INTRAVENOUS at 10:55

## 2023-02-22 RX ADMIN — OXYCODONE HYDROCHLORIDE 10 MG: 5 SOLUTION ORAL at 12:09

## 2023-02-22 RX ADMIN — OXYCODONE HYDROCHLORIDE 10 MG: 10 TABLET, FILM COATED, EXTENDED RELEASE ORAL at 10:23

## 2023-02-22 RX ADMIN — HYDROMORPHONE HYDROCHLORIDE 0.4 MG: 2 INJECTION INTRAMUSCULAR; INTRAVENOUS; SUBCUTANEOUS at 11:12

## 2023-02-22 RX ADMIN — ONDANSETRON 4 MG: 2 INJECTION INTRAMUSCULAR; INTRAVENOUS at 14:33

## 2023-02-22 RX ADMIN — FENTANYL CITRATE 50 MCG: 50 INJECTION, SOLUTION INTRAMUSCULAR; INTRAVENOUS at 10:47

## 2023-02-22 RX ADMIN — PROPOFOL 200 MG: 10 INJECTION, EMULSION INTRAVENOUS at 10:49

## 2023-02-22 ASSESSMENT — PAIN DESCRIPTION - PAIN TYPE: TYPE: ACUTE PAIN

## 2023-02-22 ASSESSMENT — PAIN SCALES - GENERAL: PAIN_LEVEL: 2

## 2023-02-22 ASSESSMENT — FIBROSIS 4 INDEX: FIB4 SCORE: 0.93

## 2023-02-22 NOTE — ANESTHESIA PREPROCEDURE EVALUATION
Case: [id number] Date/Time: 02/22/23 1205    Procedure: LAPAROSCOPIC REMOVAL OF ADJUSTABLE GASTRIC BAND AND PORT    Pre-op diagnosis: GASTRIC BAND COMPLICATION    Location: [facility] OR 10 / SURGERY [facility]    Surgeons: Levi Gonzalez M.D.          Relevant Problems   ANESTHESIA   (positive) Obstructive sleep apnea syndrome      CARDIAC   (positive) Retinal vasculitis      GI   (positive) GERD (gastroesophageal reflux disease)      ENDO   (positive) Hypothyroidism      Other   (positive) ADD (attention deficit disorder) without hyperactivity   (positive) Anxiety   (positive) History of stroke   (positive) Marijuana use   (positive) Mixed hyperlipidemia   (positive) Obesity (BMI 30-39.9)   (positive) Panic attacks   (positive) Status post bariatric surgery       Physical Exam    Airway   Mallampati: II  TM distance: >3 FB  Neck ROM: full       Cardiovascular - normal exam  Rhythm: regular  Rate: normal  (-) murmur     Dental - normal exam           Pulmonary - normal exam  Breath sounds clear to auscultation     Abdominal    Neurological - normal exam                 Anesthesia Plan    ASA 3   ASA physical status 3 criteria: CVA or TIA - history (> 3 months)    Plan - general       Airway plan will be ETT          Induction: intravenous    Postoperative Plan: Postoperative administration of opioids is intended.    Pertinent diagnostic labs and testing reviewed    Informed Consent:    Anesthetic plan and risks discussed with patient.    Use of blood products discussed with: patient whom consented to blood products.

## 2023-02-22 NOTE — DISCHARGE INSTRUCTIONS
HOME CARE INSTRUCTIONS    ACTIVITY: Rest and take it easy for the first 24 hours.  A responsible adult is recommended to remain with you during that time.  It is normal to feel sleepy.  We encourage you to not do anything that requires balance, judgment or coordination.    FOR 24 HOURS DO NOT:  Drive, operate machinery or run household appliances.  Drink beer or alcoholic beverages.  Make important decisions or sign legal documents.    SPECIAL INSTRUCTIONS:     Lap Band and Port Removal Discharge instructions:    1. DIET: Upon discharge from the hospital you may resume your normal preoperative diet. Depending on how you are feeling and whether you have nausea or not, you may wish to stay with a bland diet for the first few days. However, you can advance this as quickly as you feel ready.    2. ACTIVITIES: After discharge from the hospital, you may resume full routine activities. However, there should be no heavy lifting (greater than 20 pounds) and no strenuous activities for the next 4 weeks. Otherwise, routine activities of daily living are acceptable.    3. DRIVING: You may drive whenever you are off pain medications and are able to perform the activities needed to drive, i.e. turning, bending, twisting, etc.    4. BATHING: Your wound at the port removal site contains a small amount of packing covered with gauze and tape. Within the next few days, if the outer dressing becomes saturated with pink/bloody fluid, you may change the outer dressing, but leave the packing in place. You can remove the entire dressing and packing in a few days, on Sunday 2/26/2023 and place a dry dressing over the small wound.  You should not get the wound wet. Sponge baths only. Your other incisions have bandaids with steri-strips under your dressings. These may drain a little pink/bloody drainage. This is normal. The bandaids may come off after 48 hours. The steri-strips should stay in place. They will fall off over 5-7 days.     5.  BOWEL FUNCTION: Constipation is common after an operation, especially with pain medications. The combination of pain medication and decreased activity level can cause constipation in otherwise normal patients. If you feel this is occurring, take a laxative (Miralax, Milk of Magnesia, Ex-Lax, Senokot, etc.) until the problem has resolved.    6. PAIN MEDICATION: You will be given a prescription for pain medication at discharge. Please take these as directed. It is important to remember not to take medications on an empty stomach as this may cause nausea.    7.CALL IF YOU HAVE: (1) Fevers to more than 101.0 F, (2) Unusual chest or leg pain, (3) Drainage or fluid from incision that may be foul smelling, increased tenderness or soreness at the wound or the wound edges are no longer together, redness or swelling at the incision site. Please do not hesitate to call with any other questions.     8. APPOINTMENT: Contact our office at [phone] for a follow-up appointment in 3-5 days following your procedure.    If you have any additional questions, please do not hesitate to call the office and speak to either myself or the physician on call.    Office address:  [address]  [address] [place] [address]    DIET: To avoid nausea, slowly advance diet as tolerated, avoiding spicy or greasy foods for the first day.  Add more substantial food to your diet according to your physician's instructions.  Babies can be fed formula or breast milk as soon as they are hungry.  INCREASE FLUIDS AND FIBER TO AVOID CONSTIPATION.    SURGICAL DRESSING/BATHING: See above.    MEDICATIONS: Resume taking daily medication.  Take prescribed pain medication with food.  If no medication is prescribed, you may take non-aspirin pain medication if needed.  PAIN MEDICATION CAN BE VERY CONSTIPATING.  Take a stool softener or laxative such as senokot, pericolace, or milk of magnesia if needed.    Prescriptions at [facility] for  patient to .    Last pain medication given at -Tylenol 1000mg at 11:45am, Gabapentin 300mg and Oxycontin CR 10mg at 10:23am    You have a Scopolamine Patch in place behind your right ear,(helps reduce nausea), it may stay in place for up to 3 days, when removing patch, wrap it in tissue, discard in trash and wash your hands,    A follow-up appointment should be arranged with your doctor in 3-5 days; call to schedule.    You should CALL YOUR PHYSICIAN if you develop:  Fever greater than 101 degrees F.  Pain not relieved by medication, or persistent nausea or vomiting.  Excessive bleeding (blood soaking through dressing) or unexpected drainage from the wound.  Extreme redness or swelling around the incision site, drainage of pus or foul smelling drainage.  Inability to urinate or empty your bladder within 8 hours.  Problems with breathing or chest pain.    You should call 911 if you develop problems with breathing or chest pain.  If you are unable to contact your doctor or surgical center, you should go to the nearest emergency room or urgent care center.  Physician's telephone #: Dr. Gonzalez [phone]    MILD FLU-LIKE SYMPTOMS ARE NORMAL.  YOU MAY EXPERIENCE GENERALIZED MUSCLE ACHES, THROAT IRRITATION, HEADACHE AND/OR SOME NAUSEA.    If any questions arise, call your doctor.  If your doctor is not available, please feel free to call the Surgical Center at [phone].  The Center is open Monday through Friday from 7AM to 7PM.      A registered nurse may call you a few days after your surgery to see how you are doing after your procedure.    You may also receive a survey in the mail within the next two weeks and we ask that you take a few moments to complete the survey and return it to us.  Our goal is to provide you with very good care and we value your comments.     Depression / Suicide Risk    As you are discharged from this [facility] facility, it is important to learn how to keep safe from harming  yourself.    Recognize the warning signs:  Abrupt changes in personality, positive or negative- including increase in energy   Giving away possessions  Change in eating patterns- significant weight changes-  positive or negative  Change in sleeping patterns- unable to sleep or sleeping all the time   Unwillingness or inability to communicate  Depression  Unusual sadness, discouragement and loneliness  Talk of wanting to die  Neglect of personal appearance   Rebelliousness- reckless behavior  Withdrawal from people/activities they love  Confusion- inability to concentrate     If you or a loved one observes any of these behaviors or has concerns about self-harm, here's what you can do:  Talk about it- your feelings and reasons for harming yourself  Remove any means that you might use to hurt yourself (examples: pills, rope, extension cords, firearm)  Get professional help from the community (Mental Health, Substance Abuse, psychological counseling)  Do not be alone:Call your Safe Contact- someone whom you trust who will be there for you.  Call your local CRISIS HOTLINE 865-9767 or 671-236-4356  Call your local Children's Mobile Crisis Response Team [place] (476) 514-8869 or www.[employer]  Call the toll free National Suicide Prevention Hotlines   National Suicide Prevention Lifeline 382-189-IQOX (4188)  National Hope Line Network 800-SUICIDE (061-3495)    I acknowledge receipt and understanding of these Home Care instructions.

## 2023-02-22 NOTE — ANESTHESIA TIME REPORT
Anesthesia Start and Stop Event Times     Date Time Event    2/22/2023 1030 Ready for Procedure     1044 Anesthesia Start     1139 Anesthesia Stop        Responsible Staff  02/22/23    Name Role Begin End    Erin Kemp M.D. Anesth 1044 1130        Overtime Reason:  no overtime (within assigned shift)    Comments:

## 2023-02-22 NOTE — OR NURSING
VSS, pt steady with ambulation, meets discharge criteria. Discharged home with family. Wheeled to car with hospital escort. Rx to be picked up at home pharmacy. Drsgs CDI, extra dressing supplies provided . IV dc'd, cathlon intact. Pt to f/u with physician as directed by physician. Pt to return to ER for any emergent sx. Pt verbalizes understanding of discharge instructions and all questions were answered.

## 2023-02-22 NOTE — ANESTHESIA PROCEDURE NOTES
Airway    Date/Time: 2/22/2023 10:50 AM  Performed by: Erin Kemp M.D.  Authorized by: Erin Kemp M.D.     Location:  OR  Urgency:  Elective  Difficult Airway: No    Indications for Airway Management:  Anesthesia      Spontaneous Ventilation: absent    Sedation Level:  Deep  Preoxygenated: Yes    Patient Position:  Sniffing  Mask Difficulty Assessment:  1 - vent by mask  Final Airway Type:  Endotracheal airway  Final Endotracheal Airway:  ETT  Cuffed: Yes    Technique Used for Successful ETT Placement:  Direct laryngoscopy    Insertion Site:  Oral  Blade Type:  Leisa  Laryngoscope Blade/Videolaryngoscope Blade Size:  3  ETT Size (mm):  7.0  Measured from:  Teeth  ETT to Teeth (cm):  21  Placement Verified by: auscultation and capnometry    Cormack-Lehane Classification:  Grade I - full view of glottis  Number of Attempts at Approach:  1

## 2023-02-22 NOTE — OR NURSING
Pt arrives from OR to PACU at 1136. Pt identification verified by team, pt placed on all monitors with alarms audible, report and care of pt received from Anesthesiologist and RN. Assessment completed, pt changed into hospital gown and provided with warm blankets.     1230- Message sent to pt significant other (Shree) with update.     1410- Report called to phase II. Pt transported to phase II via gurney.

## 2023-02-22 NOTE — OP REPORT
NAME:  Leigh Whitley  MRN:  [id number]  :  1970      DATE OF OPERATION: 2023    PREOPERATIVE DIAGNOSIS: Morbid Obesity with medical sequelae; Dysphagia, port pain and Intolerance to Lap Band    POSTOPERATIVE DIAGNOSIS: Morbid Obesity with medical sequelae; Dysphagia, port pain and Intolerance to Lap Band; Complex abdominal wall wound    OPERATION PERFORMED: Removal of gastric band and abdominal wall port and biological wound graft application    SURGEON: Levi Gonzalez MD    ASSISTANT:  Cherelle Aguillon PA-C, PA-C    ANESTHESIOLOGIST:  Anesthesiologist: Erin Kemp M.D.    ANESTHESIA: General endotracheal anesthesia.     ESTIMATED BLOOD LOSS: <10cc.     INDICATIONS: The patient is a 52 y.o. female with a diagnosis of morbid obesity with medical sequelae with intolerance to gastric band. She is taken to the operating room today for Laparoscopic removal of gastric band.     PROCEDURE: Following informed consent, the patient was properly identified, taken to the operating room, and placed in the supine position where general endotracheal anesthesia was administered. Intravenous antibiotics were administered by the anesthesiologist in the correct time interval. Sequential compression devices were employed. The abdomen was prepped and draped into a sterile field.     A bladeless optical entry trocar was carefully inserted into the abdomen and a pneumoperitoneum was established in the usual fashion. A bladeless 5 mm separator trocar was introduced. A 5 mm lens/camera was passed into the peritoneal cavity. Three additional separator trocars were placed under direct vision. A 5 mm August-type liver retractor was placed into position. This was used to elevate the left sided segment of the liver. It was secured to the patients right side with a robot arm. Careful inspection revealed no untoward events with placement of the first trocars.     The band was unclasped and removed easily from around the  stomach, the tubing cut and removed.     The ports were removed under direct vision and the pneumoperitoneum was allowed to escape. The abdominal wall port and remaining tubing were removed and the wound irrigated and treated with xcellistem biograft 1000mg powder before being closed in layers. The fascia of this port was closed with 0-Vicryl suture. Iodoform gauze was tucked into the open skin area of the lapband port site. The port sites were then irrigated well. The port site skin incisions were closed with interrupted 4-0 Vicryl subcuticular sutures. Steri-Strips and Benzoin were applied beneath sterile Band-Aids.     The patient tolerated the procedure well and there were no apparent complications. All sponge, needle, and instrument counts were correct on 2 separate occasions. She was awakened, extubated, and transferred to the recovery room in satisfactory condition.       ____________________________________   Levi Gonzalez MD  DD: 2/22/2023  1:14 PM    CC:  [facility];

## 2023-02-22 NOTE — ANESTHESIA POSTPROCEDURE EVALUATION
Patient: Leigh Whitley    Procedure Summary     Date: 02/22/23 Room / Location: [place] [address] / SURGERY [facility]    Anesthesia Start: 1044 Anesthesia Stop: 1139    Procedure: LAPAROSCOPIC REMOVAL OF ADJUSTABLE GASTRIC BAND AND PORT (Abdomen) Diagnosis: (GASTRIC BAND COMPLICATION)    Surgeons: Levi Gonzalez M.D. Responsible Provider: Erin Kemp M.D.    Anesthesia Type: general ASA Status: 3          Final Anesthesia Type: general  Last vitals  BP   Blood Pressure: (!) 86/49    Temp   36.1 °C (96.9 °F)    Pulse   65   Resp   17    SpO2   92 %      Anesthesia Post Evaluation    Patient location during evaluation: PACU  Patient participation: complete - patient participated  Level of consciousness: awake and alert  Pain score: 2    Airway patency: patent  Anesthetic complications: no  Cardiovascular status: hemodynamically stable  Respiratory status: acceptable  Hydration status: euvolemic    PONV: none          No notable events documented.     Nurse Pain Score: 2 (NPRS)

## 2023-03-03 DIAGNOSIS — F41.0 PANIC ATTACKS: ICD-10-CM

## 2023-03-03 DIAGNOSIS — F41.9 ANXIETY: ICD-10-CM

## 2023-03-03 RX ORDER — CITALOPRAM 20 MG/1
TABLET ORAL
Qty: 90 TABLET | Refills: 0 | Status: SHIPPED | OUTPATIENT
Start: 2023-03-03 | End: 2023-06-01

## 2023-04-21 ENCOUNTER — OFFICE VISIT (OUTPATIENT)
Dept: WOUND CARE | Facility: MEDICAL CENTER | Age: 53
End: 2023-04-21
Attending: STUDENT IN AN ORGANIZED HEALTH CARE EDUCATION/TRAINING PROGRAM
Payer: COMMERCIAL

## 2023-04-21 VITALS
TEMPERATURE: 97 F | SYSTOLIC BLOOD PRESSURE: 114 MMHG | OXYGEN SATURATION: 98 % | RESPIRATION RATE: 18 BRPM | HEART RATE: 66 BPM | DIASTOLIC BLOOD PRESSURE: 72 MMHG

## 2023-04-21 DIAGNOSIS — Z98.84 HISTORY OF REMOVAL OF LAPAROSCOPIC GASTRIC BANDING DEVICE: ICD-10-CM

## 2023-04-21 DIAGNOSIS — T81.89XA NON-HEALING SURGICAL WOUND, INITIAL ENCOUNTER: ICD-10-CM

## 2023-04-21 PROCEDURE — 99214 OFFICE O/P EST MOD 30 MIN: CPT

## 2023-04-21 PROCEDURE — 99203 OFFICE O/P NEW LOW 30 MIN: CPT | Performed by: STUDENT IN AN ORGANIZED HEALTH CARE EDUCATION/TRAINING PROGRAM

## 2023-04-21 RX ORDER — BRIMONIDINE TARTRATE 2 MG/ML
1 SOLUTION/ DROPS OPHTHALMIC 2 TIMES DAILY
COMMUNITY
Start: 2023-02-08

## 2023-04-21 ASSESSMENT — ENCOUNTER SYMPTOMS
ABDOMINAL PAIN: 0
CHILLS: 0
NAUSEA: 0
FEVER: 0
VOMITING: 0

## 2023-04-21 NOTE — PROGRESS NOTES
Provider Encounter- Full Thickness wound    HISTORY OF PRESENT ILLNESS  Wound History:    START OF CARE IN CLINIC: 4/21/2023    REFERRING PROVIDER: Jyoti Kothari      WOUND- Full Thickness Wound   LOCATION: Left Upper Quadrant Full thickness wound   HISTORY:  52F with PMHx of Anxiety, depression, Hx lyme disease, Hx hypothyroidism, obesity. Patient had Lap Band removal with Dr. Gonzalez 2/22/2023 due to complications and not tolerating. According to patient her port site was left open to heal by secondary intention and was treated with wound VAC in Dr. Gonzalez's clinic. She was unhappy with lack of progress and felt that she was not receiving adequate care. Patient was referred to [facility].    Pertinent Medical History: Anxiety, Hx depression,  Hypothyroidism, Hx lyme disease, obesity    TOBACCO USE:  Denies use    Patient's problem list, allergies, and current medications reviewed and updated in Epic    Interval History:  4/21/2023: Clinic visit with Amari Vaughan MD. Patient reports that she is feeling ok, but is incredibly frusturated by lack of progress of wound and care she was receiving at surgeon's office. Patient had Lap Band removal due to complications and not tolerating on 2/22/2023 with Dr. Gonzalez. He left her port site open to heal by secondary intention. She was treated with wound VAC without resolution. Reports recently completed course of Bactrim due to concerns for infection. Wound has undermining with small tunnel which is not amenable to wound VAC today. Patient is currently working and not candidate for home health.      REVIEW OF SYSTEMS:   Review of Systems   Constitutional:  Negative for chills and fever.   Gastrointestinal:  Negative for abdominal pain, nausea and vomiting.   Skin:         Open wound abdomen     PHYSICAL EXAMINATION:   /72   Pulse 66   Temp 36.1 °C (97 °F) (Temporal)   Resp 18   SpO2 98%     Physical Exam  Cardiovascular:      Rate and Rhythm: Normal rate.   Pulmonary:       Effort: Pulmonary effort is normal. No respiratory distress.   Abdominal:      General: There is no distension.      Palpations: Abdomen is soft.   Skin:     Comments: Open wound left upper quadrant - Surgical site appears to have been left open to heal by secondary intention. Wound is now a tract into deeper undermining. No slough. Tract too small to safely VAC. Moderate serous drainage with occasional turbid fluid. No periwound erythema, induration.   Neurological:      Mental Status: She is alert.       WOUND ASSESSMENT  Wound 02/22/23 Incision Abdomen LUQ abdomen (Active)   Wound Image   04/21/23 0900   Site Assessment Red;Pink 04/21/23 0900   Periwound Assessment Rash;Scar tissue 04/21/23 0900   Margins Attached edges;Unattached edges 04/21/23 0900   Drainage Amount Large 04/21/23 0900   Drainage Description Tan 04/21/23 0900   Treatments Cleansed;Topical Lidocaine;Provider debridement;Site care 04/21/23 0900   Wound Cleansing Puracyn [place] 04/21/23 0900   Periwound Protectant Skin Protectant Wipes to Periwound;Barrier Paste 04/21/23 0900   Dressing Cleansing/Solutions Normal Saline 04/21/23 0900   Dressing Options Collagen Dressing;Hydrofiber Silver Strip;Hydrofiber Silver;Silicone Adhesive Foam 04/21/23 0900   Dressing Changed New 04/21/23 0900   Dressing Status Clean;Dry;Intact 04/21/23 0900   Non-staged Wound Description Full thickness 04/21/23 0900   Wound Length (cm) 0.8 cm 04/21/23 0900   Wound Width (cm) 2.8 cm 04/21/23 0900   Wound Depth (cm) 4.5 cm 04/21/23 0900   Wound Surface Area (cm^2) 2.24 cm^2 04/21/23 0900   Wound Volume (cm^3) 10.08 cm^3 04/21/23 0900   Tunneling (cm) 0 cm 04/21/23 0900   Undermining (cm) 0 cm 04/21/23 0900   Wound Odor None 04/21/23 0900   Exposed Structures CAROLINE 04/21/23 0900   Number of days: 58       PROCEDURE:   - Did not require debridement today    Pertinent Labs and Diagnostics:    Labs:     A1c: No results found for: HBA1C       IMAGING: None    VASCULAR STUDIES:  None    LAST  WOUND CULTURE:  DATE : No results found for: CULTRSULT      ASSESSMENT AND PLAN:     1. Non-healing surgical wound, initial encounter  2. History of removal of laparoscopic gastric banding device  Comments: Had removal of Lap Band by Dr. Gonzalez 2/2023, apparently left abdominal port site open to heal by secondary intention    4/21/2023  - Patient has been having wound VAC applied at Dr. Gonzalez office without resolution. Wound has tract that probes deep and has undermining 9 oclock under the partially healed aspect of wound  - Wound tract too small to VAC with black foam safely, risk of foam piece breaking off and causing infection  - Recommend next visit using subcutaneous lidocaine and opening the wound to allow for better access to wound base for continued VAC use. Patient agreeable  - Recommend she bring wound VAC and all supplies to next clinic visit  - Patient just completed Bactrim. Does not appear grossly infected, monitor off Abx for now  - Return to clinic three times weekly for I&D and VAC changes   Wound Care: Collagen, hydrofiber silver strip, hydrofiber silver, silicone adhesive foam      PATIENT EDUCATION  - Importance of adequate nutrition for wound healing  -Advised to go to ER for any increased redness, swelling, drainage, or odor, or if patient develops fever, chills, nausea or vomiting.     My total time spent caring for the patient on the day of the encounter was 30 minutes.   This does not include time spent on separately billable procedures/tests.       Please note that this note may have been created using voice recognition software. I have worked with technical experts from [facility] to optimize the interface.  I have made every reasonable attempt to correct obvious errors, but there may be errors of grammar and possibly content that I did not discover before finalizing the note.    N

## 2023-04-21 NOTE — PATIENT INSTRUCTIONS
-Keep your wound dressing clean, dry, and intact.    -Change your dressing if it becomes soiled, soaked, or falls off.    -Should you experience any significant changes in your wound(s), such as infection (redness, swelling, localized heat, increased pain, fever > 101 F, chills) or have any questions regarding your home care instructions, please contact the wound center at [phone]. If after hours, contact your primary care physician or go to the hospital emergency room.

## 2023-04-24 ENCOUNTER — TELEPHONE (OUTPATIENT)
Dept: SLEEP MEDICINE | Facility: MEDICAL CENTER | Age: 53
End: 2023-04-24
Payer: COMMERCIAL

## 2023-04-24 NOTE — TELEPHONE ENCOUNTER
SLEEP - NEW PATIENT CHART PREP COMPLETED ON 04/24/2023    SCHEDULED FOLLOW UP 04/25/2023    Ref by P[facility]AS Dx: Obstructive sleep apnea syndrome    Referring OV Notes Reviewed : YES     Is Pt currently on PAP? NO .SPOKE WITH PATIENT AND WAS INFORMED IS NOT INTERESTED IN PAP THERAPY , POSSIBLY O2 DUE TO HAVING SOB , PATIENT HAS BEEN INFORMED SLEEP CONSULT ONLY, PATIENT STATED SHE UNDERSTOOD.     Nocturnal Oxygen :NO . If yes, Liter Flow? N/A    Any prior Sleep Studies on file? YES . If yes, when?  [place] SLEEP DX 10/05/2022    Previously seen with [facility]? NO If yes, when? N/A    Previous PMA Patient? NO    HOD PREP   LAST ED : 02/2023   RECENT LABS WITHIN [facility] : 02/2023  
(V5) oriented

## 2023-04-26 ENCOUNTER — OFFICE VISIT (OUTPATIENT)
Dept: WOUND CARE | Facility: MEDICAL CENTER | Age: 53
End: 2023-04-26
Attending: STUDENT IN AN ORGANIZED HEALTH CARE EDUCATION/TRAINING PROGRAM
Payer: COMMERCIAL

## 2023-04-26 ENCOUNTER — TELEPHONE (OUTPATIENT)
Dept: SLEEP MEDICINE | Facility: MEDICAL CENTER | Age: 53
End: 2023-04-26

## 2023-04-26 VITALS
SYSTOLIC BLOOD PRESSURE: 121 MMHG | RESPIRATION RATE: 17 BRPM | TEMPERATURE: 99 F | HEART RATE: 66 BPM | DIASTOLIC BLOOD PRESSURE: 76 MMHG | OXYGEN SATURATION: 96 %

## 2023-04-26 DIAGNOSIS — Z98.84 HISTORY OF REMOVAL OF LAPAROSCOPIC GASTRIC BANDING DEVICE: ICD-10-CM

## 2023-04-26 DIAGNOSIS — T81.89XD NON-HEALING SURGICAL WOUND, SUBSEQUENT ENCOUNTER: ICD-10-CM

## 2023-04-26 PROCEDURE — 11042 DBRDMT SUBQ TIS 1ST 20SQCM/<: CPT

## 2023-04-26 PROCEDURE — 97605 NEG PRS WND THER DME<=50SQCM: CPT

## 2023-04-26 PROCEDURE — 11042 DBRDMT SUBQ TIS 1ST 20SQCM/<: CPT | Performed by: STUDENT IN AN ORGANIZED HEALTH CARE EDUCATION/TRAINING PROGRAM

## 2023-04-26 NOTE — PATIENT INSTRUCTIONS
-Keep your wound dressing clean, dry, and intact.    -Change your dressing if it becomes soiled, soaked, or falls off.    -Wound vac may not have any drainage in tube or cannister & it will still be working.   Change cannister if it does become full by pressing tab on side of machine to remove canister and snap on new one. Full canister can be thrown in the trash. If cannister fills with bright red blood - go to ER. Dressing will be changed every MWF at the wound clini.  If you are having issues with your wound VAC, please consider patching leaks, changing the canister, or calling [phone] for troubleshooting. If the wound VAC has been off or un-operational for over 2 hours, call wound care center to inform them and remove all dressings including black foam and replace with normal saline damp gauze.     -Should you experience any significant changes in your wound(s), such as infection (redness, swelling, localized heat, increased pain, fever > 101 F, chills) or have any questions regarding your home care instructions, please contact the wound center at [phone]. If after hours, contact your primary care physician or go to the hospital emergency room.

## 2023-04-26 NOTE — PROGRESS NOTES
NPWT < 50 sq cm dressing applied this visit. 1 new piece of black foam placed to wound bed with an additional piece of black foam used for tracpad (2 total). Pump set to 125 mmhg,  continuous suction. No leaks detected. Refer to flow sheet for wound care details. Patient tolerated the procedure well.

## 2023-04-26 NOTE — PROGRESS NOTES
Provider Encounter- Full Thickness wound    HISTORY OF PRESENT ILLNESS  Wound History:    START OF CARE IN CLINIC: 4/21/2023    REFERRING PROVIDER: Jyoti Kothari      WOUND- Full Thickness Wound   LOCATION: Left Upper Quadrant Full thickness wound   HISTORY:  52F with PMHx of Anxiety, depression, Hx lyme disease, Hx hypothyroidism, obesity. Patient had Lap Band removal with Dr. Gonzalez 2/22/2023 due to complications and not tolerating. According to patient her port site was left open to heal by secondary intention and was treated with wound VAC in Dr. Gonzalez's clinic. She was unhappy with lack of progress and felt that she was not receiving adequate care. Patient was referred to [facility].    Pertinent Medical History: Anxiety, Hx depression,  Hypothyroidism, Hx lyme disease, obesity    TOBACCO USE:  Denies use    Patient's problem list, allergies, and current medications reviewed and updated in Epic    Interval History:  4/21/2023: Clinic visit with Amari Vaughan MD. Patient reports that she is feeling ok, but is incredibly frusturated by lack of progress of wound and care she was receiving at surgeon's office. Patient had Lap Band removal due to complications and not tolerating on 2/22/2023 with Dr. Gonzalez. He left her port site open to heal by secondary intention. She was treated with wound VAC without resolution. Reports recently completed course of Bactrim due to concerns for infection. Wound has undermining with small tunnel which is not amenable to wound VAC today. Patient is currently working and not candidate for home health.    4/26/2023: Clinic visit with Amari Vaughan MD. Patient reports doing well. She went on walk recent and reported increased drainage following walk. She was questioning if she can do minor exercises, I recommend she walk and stay active, recommend against doing abdominal exercises or weight lifting currently. Patient brought wound VAC and supplies today and is prepared for excisional  debridement of wound with injection of subcutaneous lidocaine to open wound to allow wound VAC to base of wound.      REVIEW OF SYSTEMS:   Unchanged from previous wound clinic assessment on 4/21/2023, except as noted in interval history above      PHYSICAL EXAMINATION:   /76   Pulse 66   Temp 37.2 °C (99 °F) (Temporal)   Resp 17   SpO2 96%     Physical Exam  Cardiovascular:      Rate and Rhythm: Normal rate.   Pulmonary:      Effort: Pulmonary effort is normal. No respiratory distress.   Abdominal:      General: There is no distension.      Palpations: Abdomen is soft.   Skin:     Comments: Open wound left upper quadrant - Wound opening measuring smaller, tract depth has not changed. Has some turbid fluid drainage. Moderate serous drainage with occasional turbid fluid. No periwound erythema, induration.   Neurological:      Mental Status: She is alert.       WOUND ASSESSMENT  Wound 02/22/23 Incision Abdomen LUQ abdomen (Active)   Wound Image    04/26/23 0900   Site Assessment Red;Pink 04/26/23 0900   Periwound Assessment Scar tissue 04/26/23 0900   Margins Attached edges;Unattached edges 04/26/23 0900   Drainage Amount Large 04/26/23 0900   Drainage Description Serosanguineous 04/26/23 0900   Treatments Cleansed;Topical Lidocaine;Injectible Lidocaine;Provider debridement;Site care 04/26/23 0900   Wound Cleansing Puracyn [place] 04/26/23 0900   Periwound Protectant Skin Protectant Wipes to Periwound 04/26/23 0900   Dressing Cleansing/Solutions Normal Saline 04/26/23 0900   Dressing Options Collagen Dressing;Wound Vac 04/26/23 0900   Dressing Changed New 04/26/23 0900   Dressing Status Clean;Dry;Intact 04/26/23 0900   Non-staged Wound Description Full thickness 04/26/23 0900   Wound Length (cm) 0.5 cm 04/26/23 0900   Wound Width (cm) 2.5 cm 04/26/23 0900   Wound Depth (cm) 4.5 cm 04/26/23 0900   Wound Surface Area (cm^2) 1.25 cm^2 04/26/23 0900   Wound Volume (cm^3) 5.625 cm^3 04/26/23 0900   Post-Procedure  Length (cm) 0.8 cm 04/26/23 0900   Post-Procedure Width (cm) 1.9 cm 04/26/23 0900   Post-Procedure Depth (cm) 4.5 cm 04/26/23 0900   Post-Procedure Surface Area (cm^2) 1.52 cm^2 04/26/23 0900   Post-Procedure Volume (cm^3) 6.84 cm^3 04/26/23 0900   Wound Healing % 44 04/26/23 0900   Tunneling (cm) 0 cm 04/26/23 0900   Undermining (cm) 0 cm 04/26/23 0900   Wound Odor None 04/26/23 0900   Exposed Structures Adipose 04/26/23 0900   Number of days: 63       PROCEDURE: Excisional debridement of wound and tissue overlying undermining with scalpel and injectable lidocaine.  -2% viscous lidocaine applied topically to wound bed for approximately 5 minutes prior to debridement  - Discussed benefits vs risk of procedure. Needed to open wound tract to allow for better wound VAC application. Risks discussed but not limited to pain, infection, bleeding, larger wound. Options such as continued conservative care discussed. Patient expressed understanding of risks and wished to proceed.  - Signout was performed with nurse to verify patient, procedure, location.  - Skin was prepped with chlorhexidine in normal fashion  - Sterile gloves and precautions used for rest of procedure  - Injected 8ml of 2% lidocaine with epinephrine into subcutaneous tissue  - Used forceps and scalpel to cut down tissue to base of wound. Small ellipsoid excision of skin to open wound to allow for wound VAC.  - Excisional debridement was performed to remove healed tissue and skin over undermining. Entire surface of wound, 1.52 cm2 debrided.  Tissue debrided into the subcutaneous layer.    -Bleeding controlled with manual pressure.    - Estimated blood loss <2ml  - Procedure performed without any pain or complication  -Wound care completed by wound RN, refer to flowsheet  -Patient tolerated the procedure well, without c/o pain or discomfort.      Pertinent Labs and Diagnostics:    Labs:     A1c: No results found for: HBA1C       IMAGING: None    VASCULAR  STUDIES: None    LAST  WOUND CULTURE:  DATE : No results found for: CULTRSULT      ASSESSMENT AND PLAN:     1. Non-healing surgical wound, subsequent encounter  2. History of removal of laparoscopic gastric banding device  Comments: Had removal of Lap Band by Dr. Gonzalez 2/2023, apparently left abdominal port site open to heal by secondary intention    4/26/2023  - Wound opening measures smaller, however wound tract remains with same depth.  - Counseled that wound tract is too small for wound VAC to be applied safely or effectively  - Patient was agreeable to excise skin and tissue over undermining. Excisional debridement performed today in clinic.  - Patient recently completed Bactrim. Does not appear grossly infected, monitor off Abx for now  - Return to clinic three times weekly for VAC changes, once with provider for assessment and further debridement as necessary.   Wound Care: Collagen into wound tract, NPWT -125mmHg continuous with black foam      PATIENT EDUCATION  - Importance of adequate nutrition for wound healing  -Advised to go to ER for any increased redness, swelling, drainage, or odor, or if patient develops fever, chills, nausea or vomiting.     Please note that this note may have been created using voice recognition software. I have worked with technical experts from [employer] to optimize the interface.  I have made every reasonable attempt to correct obvious errors, but there may be errors of grammar and possibly content that I did not discover before finalizing the note.    N

## 2023-04-27 ENCOUNTER — TELEMEDICINE (OUTPATIENT)
Dept: MEDICAL GROUP | Facility: MEDICAL CENTER | Age: 53
End: 2023-04-27
Payer: COMMERCIAL

## 2023-04-27 VITALS
BODY MASS INDEX: 30.61 KG/M2 | HEIGHT: 67 IN | WEIGHT: 195 LBS | DIASTOLIC BLOOD PRESSURE: 67 MMHG | HEART RATE: 72 BPM | SYSTOLIC BLOOD PRESSURE: 120 MMHG | TEMPERATURE: 99.8 F

## 2023-04-27 DIAGNOSIS — R06.02 SOB (SHORTNESS OF BREATH): ICD-10-CM

## 2023-04-27 DIAGNOSIS — R53.1 WEAKNESS: ICD-10-CM

## 2023-04-27 DIAGNOSIS — Z00.00 PE (PHYSICAL EXAM), ANNUAL: ICD-10-CM

## 2023-04-27 DIAGNOSIS — R00.2 PALPITATIONS: ICD-10-CM

## 2023-04-27 DIAGNOSIS — E03.4 HYPOTHYROIDISM DUE TO ACQUIRED ATROPHY OF THYROID: ICD-10-CM

## 2023-04-27 DIAGNOSIS — R00.2 RAPID PALPITATIONS: ICD-10-CM

## 2023-04-27 DIAGNOSIS — I95.1 ORTHOSTATIC HYPOTENSION: ICD-10-CM

## 2023-04-27 DIAGNOSIS — Z98.84 S/P BARIATRIC SURGERY: ICD-10-CM

## 2023-04-27 PROCEDURE — 99214 OFFICE O/P EST MOD 30 MIN: CPT | Mod: 95 | Performed by: NURSE PRACTITIONER

## 2023-04-27 ASSESSMENT — FIBROSIS 4 INDEX: FIB4 SCORE: 0.93

## 2023-04-27 NOTE — ASSESSMENT & PLAN NOTE
New problem.  Patient reports palpitations,  shortness of breath and orthostatic hypotension, blood pressure going down to the 90s with a supine position goes back up to her normal above 100.  This started after reversal gastric banding in February with Dr. Gonzalez.  She has since resigned herself from his services and now seeing wound specialist as she still has wound VAC in place.  She reports fever slightly in the 100 yesterday and still feels pretty warm.  She tolerates PO intake, denies nausea and vomiting.  Pt is foolowed by Dr. Galo, who is aware of current symptoms and not concerned for sepsis.  Patient has history of comorbidities, thyroid condition for which she is on levothyroxine 88 mcg.  Last TSH from May 2022 and normal.  Patient is requesting a referral to a specialist.

## 2023-04-27 NOTE — ASSESSMENT & PLAN NOTE
Latest Reference Range & Units 05/18/22 05:56   TSH 0.450 - 4.500 uIU/mL 0.617   Free T-4 0.82 - 1.77 ng/dL 1.22   Chronic problem.  Last panel from a year ago.  Currently on levothyroxine 88 mcg and compliant.  Recent bariatric surgery reversal.  We will recheck annual labs particularly thyroid as patient is having palpitations.

## 2023-04-27 NOTE — TELEPHONE ENCOUNTER
04-26-23  1st NO SHOW  Date of No Show: 04-25-23  Provider: Cal  Reason For Visit:NP/ DAVID  Outcome of call:      Pt declined to reschedule at time of call, Left call back for scheduling [phone].    Reason Missed: pt not feeling well, said tried to call number listed in confirmation text - but it made fax noise  ACM

## 2023-04-27 NOTE — PROGRESS NOTES
Virtual Visit: Established Patient   This visit was conducted via Zoom using secure and encrypted videoconferencing technology.   The patient was in their home in the [facility].    The patient's identity was confirmed and verbal consent was obtained for this virtual visit.     Subjective:   CC:   Chief Complaint   Patient presents with    Palpitations     Feels like she's going to faint, low BP readings, lap band removed, wound vac x 11 weeks        Leigh Whitley is a 52 y.o. female presenting for evaluation and management of:    Weakness  New problem.  Patient reports palpitations,  shortness of breath and orthostatic hypotension, blood pressure going down to the 90s with a supine position goes back up to her normal above 100.  This started after reversal gastric banding in February with Dr. Gonzalez.  She has since resigned herself from his services and now seeing wound specialist as she still has wound VAC in place.  She reports fever slightly in the 100 yesterday and still feels pretty warm.  She tolerates PO intake, denies nausea and vomiting.  Pt is foolowed by Dr. Galo, who is aware of current symptoms and not concerned for sepsis.  Patient has history of comorbidities, thyroid condition for which she is on levothyroxine 88 mcg.  Last TSH from May 2022 and normal.  Patient is requesting a referral to a specialist.      Hypothyroidism   Latest Reference Range & Units 05/18/22 05:56   TSH 0.450 - 4.500 uIU/mL 0.617   Free T-4 0.82 - 1.77 ng/dL 1.22   Chronic problem.  Last panel from a year ago.  Currently on levothyroxine 88 mcg and compliant.  Recent bariatric surgery reversal.  We will recheck annual labs particularly thyroid as patient is having palpitations.     ROS     Current medicines (including changes today)  Current Outpatient Medications   Medication Sig Dispense Refill    brimonidine (ALPHAGAN) 0.2 % Solution       citalopram (CELEXA) 20 MG Tab Take 1 tablet by mouth every morning. 90  "Tablet 0    Non Formulary Request Take 2 Tablets by mouth every day. ADDIE Immunity supplement with iron      vitamin D2, Ergocalciferol, (DRISDOL) 1.25 MG (38411 UT) Cap capsule Take 1 Capsule by mouth every 7 days. 12 Capsule 3    levothyroxine (SYNTHROID) 88 MCG Tab Take 1 Tablet by mouth every morning on an empty stomach. 90 Tablet 1     No current facility-administered medications for this visit.       Patient Active Problem List    Diagnosis Date Noted    S/P bariatric surgery 04/27/2023    Weakness 04/27/2023    Obstructive sleep apnea syndrome 11/09/2022    Snoring 09/09/2022    Vitamin D deficiency 09/09/2022    Abnormal sensation of eye 08/31/2022    Onychocryptosis 05/27/2022    Mixed hyperlipidemia 07/01/2021    History of stroke 05/14/2021    Panic attacks 05/14/2021    Severe episode of recurrent major depressive disorder, without psychotic features (HCC) 01/29/2020    ADD (attention deficit disorder) without hyperactivity 02/09/2018    Status post bariatric surgery 02/09/2018    GERD (gastroesophageal reflux disease) 02/09/2018    Environmental allergies 08/30/2017    Obesity (BMI 30-39.9) 08/30/2017    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  Skin: No rashes in visible areas.  Eye: Round. Conjunctiva clear, lids normal. No icterus.   ENMT: Lips pink without lesions, good dentition, moist mucous membranes. Phonation normal.  Neck: No masses, no thyromegaly. Moves freely without pain.  Respiratory: Unlabored respiratory effort, no cough or audible wheeze  Psych: Alert and oriented x3, normal affect and " mood.     Assessment and Plan:   The following treatment plan was discussed:   1. Rapid palpitations  Uncontrolled.  Patient reports this is being chronic.  We will need to repeat annual labs including thyroid panel particularly after recent surgery.  Patient denies being dehydrated.  She does have history of anxiety.    2. SOB (shortness of breath)  Shortness of breath is new and with exertion and only started with wound care episodes.    3. Orthostatic hypotension  Uncontrolled, stable.  Patient advised to proceed slowly when changing positions, stay adequately hydrated, utilize electrolytes and her fluids.  Would like to rule out vasovagal episodes, will appreciate cardiology evaluation.  - REFERRAL TO CARDIOLOGY    4. Weakness    5. S/P bariatric surgery  Patient is following with wound care.    6. Palpitations  - REFERRAL TO CARDIOLOGY    7. PE (physical exam), annual  - CBC WITH DIFFERENTIAL; Future  - Lipid Profile; Future  - TSH; Future  - T3 FREE; Future  - FREE THYROXINE; Future  - HEMOGLOBIN A1C; Future  - VITAMIN D,25 HYDROXY (DEFICIENCY); Future  - Comp Metabolic Panel; Future    8. Hypothyroidism due to acquired atrophy of thyroid  We will obtain thyroid panel and follow-up with the patient next week.       Discussed with patient possible alternative diagnoses, patient is to take all medications as prescribed.      If symptoms persist FU w/PCP, if symptoms worsen go to emergency room.      If experiencing any side effects from prescribed medications report to the office immediately or go to emergency room.     Reviewed indication, dosage, usage and potential adverse effects of prescribed medications.      Reviewed risks and benefits of treatment plan. Patient verbalizes understanding of all instruction and verbally agrees to plan.     Discussed plan with the patient, and patient agrees to the above.      I personally reviewed prior external notes and test results pertinent to today's visit.      Follow-up: 1 wk for lab review

## 2023-04-28 ENCOUNTER — NON-PROVIDER VISIT (OUTPATIENT)
Dept: WOUND CARE | Facility: MEDICAL CENTER | Age: 53
End: 2023-04-28
Attending: STUDENT IN AN ORGANIZED HEALTH CARE EDUCATION/TRAINING PROGRAM
Payer: COMMERCIAL

## 2023-04-28 PROCEDURE — 97602 WOUND(S) CARE NON-SELECTIVE: CPT

## 2023-04-28 NOTE — PROCEDURES
Nonselective debridement to left abdomen wound using NS/Qtip to remove loosely adherent nonviable tissue.  Pt tolerated well; denied pain.

## 2023-04-28 NOTE — PATIENT INSTRUCTIONS
-Keep your wound dressing clean, dry, and intact.    -Wound vac may not have any drainage in tube or cannister & it will still be working.   Change cannister if it does become full by pressing tab on side of machine to remove canister and snap on new one. Full canister can be thrown in the trash. If cannister fills with bright red blood - go to ER. Dressing will be changed every MWF at the wound clinic.  If you are having issues with your wound VAC, please consider patching leaks, changing the canister, or calling [phone] for troubleshooting. If the wound VAC has been off or un-operational for over 2 hours, call wound care center to inform them and remove all dressings including black foam and replace with normal saline damp gauze.     -Should you experience any significant changes in your wound(s), such as infection (redness, swelling, localized heat, increased pain, fever > 101 F, chills) or have any questions regarding your home care instructions, please contact the wound center at [phone]. If after hours, contact your primary care physician or go to the hospital emergency room.

## 2023-05-01 ENCOUNTER — NON-PROVIDER VISIT (OUTPATIENT)
Dept: WOUND CARE | Facility: MEDICAL CENTER | Age: 53
End: 2023-05-01
Attending: STUDENT IN AN ORGANIZED HEALTH CARE EDUCATION/TRAINING PROGRAM
Payer: COMMERCIAL

## 2023-05-01 DIAGNOSIS — R53.1 WEAKNESS: ICD-10-CM

## 2023-05-01 PROCEDURE — 97605 NEG PRS WND THER DME<=50SQCM: CPT

## 2023-05-01 PROCEDURE — 97602 WOUND(S) CARE NON-SELECTIVE: CPT

## 2023-05-01 NOTE — PROCEDURES
Wound vac removed by RN with 2 pieces of black foam removed.    Non selective debridement of wound using normal saline and cotton tip applicator to remove biofilm and drainage from wound bed.    Wound vac resumed using 2 pieces of regular black foam  one to wound depth and one for tract pad. No leaks detected and resumed at 125mmHg continuous.

## 2023-05-01 NOTE — PATIENT INSTRUCTIONS
-Keep your wound dressing clean, dry, and intact.    -Change your dressing if it becomes soiled, soaked, or falls off.    -Wound vac may not have any drainage in tube or cannister & it will still be working.   Change cannister if it does become full by pressing tab on side of machine to remove canister and snap on new one. Full canister can be thrown in the trash. If cannister fills with bright red blood - go to ER. Dressing will be changed every MWF at the wound clini.  If you are having issues with your wound VAC, please consider patching leaks, changing the canister, or calling [phone] for troubleshooting. If the wound VAC has been off or un-operational for over 2 hours, call wound care center to inform them and remove all dressings including black foam and replace with normal saline damp gauze.     -Should you experience any significant changes in your wound(s), such as infection (redness, swelling, localized heat, increased pain, fever > 101 F, chills) or have any questions regarding your home care instructions, please contact the wound center at [phone]. If after hours, contact your primary care physician or go to the hospital emergency room.

## 2023-05-03 ENCOUNTER — HOSPITAL ENCOUNTER (OUTPATIENT)
Dept: RADIOLOGY | Facility: MEDICAL CENTER | Age: 53
End: 2023-05-03
Attending: NURSE PRACTITIONER
Payer: COMMERCIAL

## 2023-05-03 ENCOUNTER — OFFICE VISIT (OUTPATIENT)
Dept: WOUND CARE | Facility: MEDICAL CENTER | Age: 53
End: 2023-05-03
Attending: STUDENT IN AN ORGANIZED HEALTH CARE EDUCATION/TRAINING PROGRAM
Payer: COMMERCIAL

## 2023-05-03 VITALS
TEMPERATURE: 99 F | DIASTOLIC BLOOD PRESSURE: 70 MMHG | OXYGEN SATURATION: 98 % | RESPIRATION RATE: 17 BRPM | SYSTOLIC BLOOD PRESSURE: 101 MMHG | HEART RATE: 65 BPM

## 2023-05-03 DIAGNOSIS — T81.89XD NON-HEALING SURGICAL WOUND, SUBSEQUENT ENCOUNTER: Primary | ICD-10-CM

## 2023-05-03 DIAGNOSIS — Z98.84 HISTORY OF REMOVAL OF LAPAROSCOPIC GASTRIC BANDING DEVICE: ICD-10-CM

## 2023-05-03 DIAGNOSIS — T81.89XD NON-HEALING SURGICAL WOUND, SUBSEQUENT ENCOUNTER: ICD-10-CM

## 2023-05-03 PROCEDURE — 99213 OFFICE O/P EST LOW 20 MIN: CPT | Performed by: NURSE PRACTITIONER

## 2023-05-03 PROCEDURE — 74170 CT ABD WO CNTRST FLWD CNTRST: CPT

## 2023-05-03 PROCEDURE — 700117 HCHG RX CONTRAST REV CODE 255: Performed by: NURSE PRACTITIONER

## 2023-05-03 PROCEDURE — 99213 OFFICE O/P EST LOW 20 MIN: CPT

## 2023-05-03 RX ADMIN — IOHEXOL 100 ML: 350 INJECTION, SOLUTION INTRAVENOUS at 18:30

## 2023-05-03 NOTE — PROGRESS NOTES
Provider Encounter- Full Thickness wound    HISTORY OF PRESENT ILLNESS  Wound History:    START OF CARE IN CLINIC: 4/21/2023    REFERRING PROVIDER: Jyoti Kothari      WOUND- Full Thickness Wound   LOCATION: Left Upper Quadrant Full thickness wound   HISTORY:  52F with PMHx of Anxiety, depression, Hx lyme disease, Hx hypothyroidism, obesity. Patient had Lap Band removal with Dr. Gonzalez 2/22/2023 due to complications and not tolerating. According to patient her port site was left open to heal by secondary intention and was treated with wound VAC in Dr. Gonzalez's clinic. She was unhappy with lack of progress and felt that she was not receiving adequate care. Patient was referred to [facility].    Pertinent Medical History: Anxiety, Hx depression,  Hypothyroidism, Hx lyme disease, obesity    TOBACCO USE:  Denies use    Patient's problem list, allergies, and current medications reviewed and updated in Epic    Interval History:  4/21/2023: Clinic visit with Amari Vaughan MD. Patient reports that she is feeling ok, but is incredibly frusturated by lack of progress of wound and care she was receiving at surgeon's office. Patient had Lap Band removal due to complications and not tolerating on 2/22/2023 with Dr. Gonzalez. He left her port site open to heal by secondary intention. She was treated with wound VAC without resolution. Reports recently completed course of Bactrim due to concerns for infection. Wound has undermining with small tunnel which is not amenable to wound VAC today. Patient is currently working and not candidate for home health.    4/26/2023: Clinic visit with Amari Vaughan MD. Patient reports doing well. She went on walk recent and reported increased drainage following walk. She was questioning if she can do minor exercises, I recommend she walk and stay active, recommend against doing abdominal exercises or weight lifting currently. Patient brought wound VAC and supplies today and is prepared for excisional  debridement of wound with injection of subcutaneous lidocaine to open wound to allow wound VAC to base of wound.    5/3/2023: Clinic visit with BRIDGETT John.  The depth of the wound has increased.  The wound has not progressed depth wise in three months.  Patient reports some generalized fatigue.  Occasional night sweats which may be associated with menopause.  Patient doesn't have increased pain at this time the drainage out of the wound bed is serosanguineous within the wound VAC canister.  There is no periwound erythema or edema.  However, patient does have a air pocket inferiorly to the wound with deep palpation you can hear the air released into the wound bed.  Therefore I will order a CT with contrast to determine any underlying pathology to the nonhealing wound.      REVIEW OF SYSTEMS:   Unchanged from previous wound clinic assessment on 4/26/2023, except as noted in interval history above      PHYSICAL EXAMINATION:   /70   Pulse 65   Temp 37.2 °C (99 °F) (Temporal)   Resp 17   SpO2 98%     Physical Exam  Cardiovascular:      Rate and Rhythm: Normal rate.   Pulmonary:      Effort: Pulmonary effort is normal. No respiratory distress.      Breath sounds: No wheezing.   Abdominal:      General: There is no distension.      Palpations: Abdomen is soft.   Skin:     Comments: Open wound left upper quadrant - Wound opening measuring smaller, tract depth has increased.  Moderate serosanguineous drainage occasional turbid fluid.  No periwound erythema, edema or induration.     Neurological:      Mental Status: She is alert.       WOUND ASSESSMENT  Wound 02/22/23 Incision Abdomen LUQ abdomen (Active)   Wound Image   05/03/23 0900   Site Assessment Red;Granulation tissue 05/03/23 0900   Periwound Assessment Intact 05/03/23 0900   Margins Unattached edges 05/03/23 0900   Closure Secondary intention 04/28/23 1300   Drainage Amount Moderate 05/03/23 0900   Drainage Description Serosanguineous 05/03/23  0900   Treatments Cleansed;Site care 05/03/23 0900   Wound Cleansing Normal Saline Irrigation 05/03/23 0900   Periwound Protectant Skin Protectant Wipes to Periwound;Barrier Paste 05/03/23 0900   Dressing Cleansing/Solutions Not Applicable 05/03/23 0900   Dressing Options Collagen Dressing;Wound Vac 05/01/23 1000   Dressing Changed New 05/03/23 0900   Dressing Status Clean;Dry;Intact 04/26/23 0900   Dressing Change/Treatment Frequency Monday, Wednesday, Friday, and As Needed 05/01/23 1000   Non-staged Wound Description Full thickness 05/03/23 0900   Wound Length (cm) 0.5 cm 05/03/23 0900   Wound Width (cm) 1.5 cm 05/03/23 0900   Wound Depth (cm) 5.7 cm 05/03/23 0900   Wound Surface Area (cm^2) 0.75 cm^2 05/03/23 0900   Wound Volume (cm^3) 4.275 cm^3 05/03/23 0900   Post-Procedure Length (cm) 0.8 cm 04/26/23 0900   Post-Procedure Width (cm) 1.9 cm 04/26/23 0900   Post-Procedure Depth (cm) 4.5 cm 04/26/23 0900   Post-Procedure Surface Area (cm^2) 1.52 cm^2 04/26/23 0900   Post-Procedure Volume (cm^3) 6.84 cm^3 04/26/23 0900   Wound Healing % 58 05/03/23 0900   Tunneling (cm) 0 cm 05/01/23 1000   Undermining (cm) 0 cm 05/01/23 1000   Wound Odor None 05/03/23 0900   Exposed Structures Adipose 05/03/23 0900   Number of days: 70       Procedure: No excisional debridement required in clinic today.      Pertinent Labs and Diagnostics:    Labs:     A1c: No results found for: HBA1C       IMAGING: None    VASCULAR STUDIES: None    LAST  WOUND CULTURE:  DATE : No results found for: CULTRSULT      ASSESSMENT AND PLAN:     1. Non-healing surgical wound, subsequent encounter  2. History of removal of laparoscopic gastric banding device  Comments: Had removal of Lap Band by Dr. Gonzalez 2/2023, apparently left abdominal port site open to heal by secondary intention    05/03/23:  -The depth of the wound appears to be increasing since last clinical appointment.  Query if there is some tunneling inferior to the wound bed.  With deep  palpation movement of air inferior to the wound can be heard escaping the wound bed.  - We will pack the wound bed with strip packing until patient has completed the CT.  - Last week opening of the wound was performed to allow for packing.  -Patient has completed her course of Bactrim.  There is no signs signs or symptoms of acute infection.  However, the wound depth has increased and the wound has been open for nearly 3 months with no progression in depth.  -To return later this week for packing of the wound bed.  -CT ordered to determine underlying pathology contributing to nonhealing wound.   Wound Care: Hydrofiber silver strip, Hydrofiber silver, silk adhesive foam, high fixed tape      PATIENT EDUCATION  - Importance of adequate nutrition for wound healing  -Advised to go to ER for any increased redness, swelling, drainage, or odor, or if patient develops fever, chills, nausea or vomiting.     >20 min spent face to face with patient, >50% of time spent counseling, coordinating care, reviewing records, discussing POC, educating patient      Please note that this note may have been created using voice recognition software. I have worked with technical experts from [employer] to optimize the interface.  I have made every reasonable attempt to correct obvious errors, but there may be errors of grammar and possibly content that I did not discover before finalizing the note.    N

## 2023-05-03 NOTE — PROGRESS NOTES
VAC dressing removed by CNA. Wound bed inspected and no residual foam noted. NPWT not applied this visit as there has been no wound progression and provider is ordering a CT scan. [facility] notified of vac hold.

## 2023-05-03 NOTE — PATIENT INSTRUCTIONS
-Keep your wound dressing clean, dry, and intact.    -Change your dressing if it becomes soiled, soaked, or falls off.    -Should you experience any significant changes in your wound(s), such as infection (redness, swelling, localized heat, increased pain, fever > 101 F, chills) or have any questions regarding your home care instructions, please contact the wound center at [phone]. If after hours, contact your primary care physician or go to the hospital emergency room.

## 2023-05-04 ENCOUNTER — HOSPITAL ENCOUNTER (EMERGENCY)
Facility: MEDICAL CENTER | Age: 53
End: 2023-05-04
Attending: STUDENT IN AN ORGANIZED HEALTH CARE EDUCATION/TRAINING PROGRAM | Admitting: STUDENT IN AN ORGANIZED HEALTH CARE EDUCATION/TRAINING PROGRAM
Payer: COMMERCIAL

## 2023-05-04 VITALS
TEMPERATURE: 98.4 F | SYSTOLIC BLOOD PRESSURE: 102 MMHG | DIASTOLIC BLOOD PRESSURE: 53 MMHG | HEIGHT: 66 IN | BODY MASS INDEX: 33.34 KG/M2 | RESPIRATION RATE: 16 BRPM | OXYGEN SATURATION: 96 % | WEIGHT: 207.45 LBS | HEART RATE: 69 BPM

## 2023-05-04 DIAGNOSIS — I95.1 ORTHOSTATIC HYPOTENSION: ICD-10-CM

## 2023-05-04 DIAGNOSIS — T81.89XA NON-HEALING SURGICAL WOUND, INITIAL ENCOUNTER: ICD-10-CM

## 2023-05-04 LAB
ALBUMIN SERPL BCP-MCNC: 3.8 G/DL (ref 3.2–4.9)
ALBUMIN/GLOB SERPL: 1.2 G/DL
ALP SERPL-CCNC: 90 U/L (ref 30–99)
ALT SERPL-CCNC: 22 U/L (ref 2–50)
ANION GAP SERPL CALC-SCNC: 10 MMOL/L (ref 7–16)
AST SERPL-CCNC: 25 U/L (ref 12–45)
BASOPHILS # BLD AUTO: 0.6 % (ref 0–1.8)
BASOPHILS # BLD: 0.04 K/UL (ref 0–0.12)
BILIRUB SERPL-MCNC: 0.2 MG/DL (ref 0.1–1.5)
BUN SERPL-MCNC: 26 MG/DL (ref 8–22)
CALCIUM ALBUM COR SERPL-MCNC: 9.5 MG/DL (ref 8.5–10.5)
CALCIUM SERPL-MCNC: 9.3 MG/DL (ref 8.5–10.5)
CHLORIDE SERPL-SCNC: 107 MMOL/L (ref 96–112)
CO2 SERPL-SCNC: 24 MMOL/L (ref 20–33)
CREAT SERPL-MCNC: 0.7 MG/DL (ref 0.5–1.4)
EKG IMPRESSION: NORMAL
EOSINOPHIL # BLD AUTO: 0.09 K/UL (ref 0–0.51)
EOSINOPHIL NFR BLD: 1.4 % (ref 0–6.9)
ERYTHROCYTE [DISTWIDTH] IN BLOOD BY AUTOMATED COUNT: 45.3 FL (ref 35.9–50)
GFR SERPLBLD CREATININE-BSD FMLA CKD-EPI: 104 ML/MIN/1.73 M 2
GLOBULIN SER CALC-MCNC: 3.2 G/DL (ref 1.9–3.5)
GLUCOSE SERPL-MCNC: 137 MG/DL (ref 65–99)
HCT VFR BLD AUTO: 38.2 % (ref 37–47)
HGB BLD-MCNC: 13 G/DL (ref 12–16)
IMM GRANULOCYTES # BLD AUTO: 0.02 K/UL (ref 0–0.11)
IMM GRANULOCYTES NFR BLD AUTO: 0.3 % (ref 0–0.9)
LIPASE SERPL-CCNC: 52 U/L (ref 11–82)
LYMPHOCYTES # BLD AUTO: 2.47 K/UL (ref 1–4.8)
LYMPHOCYTES NFR BLD: 38.1 % (ref 22–41)
MCH RBC QN AUTO: 32.8 PG (ref 27–33)
MCHC RBC AUTO-ENTMCNC: 34 G/DL (ref 33.6–35)
MCV RBC AUTO: 96.5 FL (ref 81.4–97.8)
MONOCYTES # BLD AUTO: 0.36 K/UL (ref 0–0.85)
MONOCYTES NFR BLD AUTO: 5.5 % (ref 0–13.4)
NEUTROPHILS # BLD AUTO: 3.51 K/UL (ref 2–7.15)
NEUTROPHILS NFR BLD: 54.1 % (ref 44–72)
NRBC # BLD AUTO: 0 K/UL
NRBC BLD-RTO: 0 /100 WBC
PLATELET # BLD AUTO: 232 K/UL (ref 164–446)
PMV BLD AUTO: 10.3 FL (ref 9–12.9)
POTASSIUM SERPL-SCNC: 4.3 MMOL/L (ref 3.6–5.5)
PROT SERPL-MCNC: 7 G/DL (ref 6–8.2)
RBC # BLD AUTO: 3.96 M/UL (ref 4.2–5.4)
SODIUM SERPL-SCNC: 141 MMOL/L (ref 135–145)
WBC # BLD AUTO: 6.5 K/UL (ref 4.8–10.8)

## 2023-05-04 PROCEDURE — A9270 NON-COVERED ITEM OR SERVICE: HCPCS | Performed by: STUDENT IN AN ORGANIZED HEALTH CARE EDUCATION/TRAINING PROGRAM

## 2023-05-04 PROCEDURE — 99284 EMERGENCY DEPT VISIT MOD MDM: CPT

## 2023-05-04 PROCEDURE — 83690 ASSAY OF LIPASE: CPT

## 2023-05-04 PROCEDURE — 80053 COMPREHEN METABOLIC PANEL: CPT

## 2023-05-04 PROCEDURE — 85025 COMPLETE CBC W/AUTO DIFF WBC: CPT

## 2023-05-04 PROCEDURE — 36415 COLL VENOUS BLD VENIPUNCTURE: CPT

## 2023-05-04 PROCEDURE — 700102 HCHG RX REV CODE 250 W/ 637 OVERRIDE(OP): Performed by: STUDENT IN AN ORGANIZED HEALTH CARE EDUCATION/TRAINING PROGRAM

## 2023-05-04 PROCEDURE — 93005 ELECTROCARDIOGRAM TRACING: CPT | Performed by: STUDENT IN AN ORGANIZED HEALTH CARE EDUCATION/TRAINING PROGRAM

## 2023-05-04 RX ORDER — LORAZEPAM 1 MG/1
1 TABLET ORAL ONCE
Status: COMPLETED | OUTPATIENT
Start: 2023-05-04 | End: 2023-05-04

## 2023-05-04 RX ORDER — SODIUM CHLORIDE 9 MG/ML
1000 INJECTION, SOLUTION INTRAVENOUS ONCE
Status: DISCONTINUED | OUTPATIENT
Start: 2023-05-04 | End: 2023-05-05 | Stop reason: HOSPADM

## 2023-05-04 RX ORDER — M-VIT,TX,IRON,MINS/CALC/FOLIC 27MG-0.4MG
1 TABLET ORAL DAILY
COMMUNITY

## 2023-05-04 RX ORDER — DIAZEPAM 2 MG/1
2 TABLET ORAL ONCE
Status: DISCONTINUED | OUTPATIENT
Start: 2023-05-04 | End: 2023-05-04

## 2023-05-04 RX ADMIN — LORAZEPAM 1 MG: 1 TABLET ORAL at 22:00

## 2023-05-04 ASSESSMENT — FIBROSIS 4 INDEX: FIB4 SCORE: 0.93

## 2023-05-04 NOTE — PROGRESS NOTES
Called and informed that I have put in an urgent referral to general surgery due to the CT results.  Patient instructed that if she develops any fever or chills, fatigue, malaise, nausea, purulent drainage or other symptoms of infection she should go directly to the ER and not wait.  Patient verbalized understanding.

## 2023-05-05 ENCOUNTER — TELEPHONE (OUTPATIENT)
Dept: WOUND CARE | Facility: MEDICAL CENTER | Age: 53
End: 2023-05-05
Payer: COMMERCIAL

## 2023-05-05 ENCOUNTER — NON-PROVIDER VISIT (OUTPATIENT)
Dept: WOUND CARE | Facility: MEDICAL CENTER | Age: 53
End: 2023-05-05
Attending: STUDENT IN AN ORGANIZED HEALTH CARE EDUCATION/TRAINING PROGRAM
Payer: COMMERCIAL

## 2023-05-05 PROCEDURE — 97602 WOUND(S) CARE NON-SELECTIVE: CPT

## 2023-05-05 NOTE — ED TRIAGE NOTES
.  Chief Complaint   Patient presents with    Wound Check     Non healing wound left flank      Pt ambulate to triage with above complaint. Pt had CT scan completed yesterday, results indicate possible fistula. Pt sent to [facility] for surgery but was denied due to being other physicians patient.    Pt sent to ED for further eval and possible surgery.   Pt educated on triage process and returned to lobby.

## 2023-05-05 NOTE — PROGRESS NOTES
Pt presented to [facility] ER yesterday. According to the the ER attending physician's notes, the surgical team refused to see her as she had no identifiable emergent surgical needs. Pt is very frustrated and distressed today. She denies fever, chills, abdominal pain or distension, fatigue, N+V, or loss of appetite. There is no fecal/enteric drainage from wound to suggest fistula. I instr her to present to the ER again if she does have fever/abdominal pain, and/or any fecal drainage from wound. She understands.

## 2023-05-05 NOTE — DISCHARGE INSTRUCTIONS
Please follow-up with wound care as scheduled tomorrow.  You should return to the ER immediately if you have fever, chills, increased redness, swelling, purulent drainage, increasing pain, vomiting or any other new or acute concern.  It is important that you follow-up with surgery as an outpatient .You can tell them that your ER doctor spoke with Dr. Kelsey

## 2023-05-05 NOTE — ED NOTES
Pt provided discharge instructions and verbalizes understanding. Pt has no questions at this time. Pt ambulates from ED with steady gait and all belongings, including discharge paperwork.

## 2023-05-05 NOTE — ED NOTES
Pt okay to drink. Provided water. Call light within reach. Bed in position of comfort. No other needs at this time.

## 2023-05-05 NOTE — ED PROVIDER NOTES
"ED Provider Note    CHIEF COMPLAINT  Chief Complaint   Patient presents with    Wound Check     Non healing wound left flank       EXTERNAL RECORDS REVIEWED  Outpatient Notes patient seen in wound care clinic yesterday morning.  Per documentation she had a lap band removal with Dr. Gonzalez 2/22/2023 due to complications and not tolerating.  According to patient her port site was left open to heal by secondary intention and was treated with a wound VAC which was removed recently.  The  documentation from clinic notes that the depth of the wound had increased since the last clinic appointment and there was question about tunneling inferior to the wound bed.  The wound was packed with plan for CT results are documented below..    HPI/ROS  LIMITATION TO HISTORY   Select: : None  OUTSIDE HISTORIAN(S):  None    Leigh Whitley is a 52 y.o. female who presents with a nonhealing abdominal wound.  Patient confirms the history as above.  She said that after the CT scan the wound care clinic called and wanted her to follow-up with general surgery.  She said that she was connected with [facility] but they would not see her as she was a patient of a different surgeon.  Due to concerns about her not being able to obtain follow-up she was advised to present to the emergency department.  She denies any actual acute change in her symptoms today however she does say that she has had a low-grade fever, persistent fatigue and palpitations which have been ongoing since this procedure.  No nausea or vomiting.  She does have pain at the wound site however no worsening today.    CT reviewed from 5/3 \" 5.0 x 2.9 cm open subcutaneous soft tissue defect defect containing gas and fluid in the left anterolateral abdominal. There is no definite evidence of fistulous connection with adjacent bowel however evaluation is limited in the absence of enteric contrast administration. The closest adjacent hollow viscus structure appears to " "be the transverse colon. If there is strong clinical suspicion for colocutaneous fistula, this exam could be repeated following administration of rectal contrast.\"    PAST MEDICAL HISTORY   has a past medical history of Adverse effect of anesthesia, Arrhythmia, Arthritis, Heart murmur, High cholesterol, Hypothyroid, Lyme disease, PONV (postoperative nausea and vomiting), Sleep apnea, and Snoring.    SURGICAL HISTORY   has a past surgical history that includes hysterectomy, total abdominal (01/01/2012); cholecystectomy (01/01/2002); appendectomy (01/01/2012); gastric banding laparoscopic (01/01/2008); tubal ligation; laminotomy; other orthopedic surgery; gyn surgery; and lap, remove adjust sima restrict d* (N/A, 2/22/2023).    FAMILY HISTORY  Family History   Problem Relation Age of Onset    Cancer Mother     Drug abuse Father        SOCIAL HISTORY  Social History     Tobacco Use    Smoking status: Never    Smokeless tobacco: Never   Vaping Use    Vaping Use: Never used   Substance and Sexual Activity    Alcohol use: Yes     Alcohol/week: 1.2 oz     Types: 1 Glasses of wine, 1 Standard drinks or equivalent per week     Comment: occ    Drug use: Not Currently     Types: Marijuana     Comment: Occasionally    Sexual activity: Yes     Partners: Male       CURRENT MEDICATIONS  Home Medications       Reviewed by [place] (Pharmacy Tech) on 05/04/23 at 2312  Med List Status: Complete     Medication Last Dose Status   brimonidine (ALPHAGAN) 0.2 % Solution >1 week Active   citalopram (CELEXA) 20 MG Tab 5/4/2023 Active   COLLAGEN PO 5/4/2023 Active   levothyroxine (SYNTHROID) 88 MCG Tab 5/4/2023 Active   Non Formulary Request 5/4/2023 Active   therapeutic multivitamin-minerals (THERAGRAN-M) Tab 5/4/2023 Active   vitamin D2, Ergocalciferol, (DRISDOL) 1.25 MG (18355 UT) Cap capsule 5/1/2023 Active                    ALLERGIES  Allergies   Allergen Reactions    Ultram [Tramadol Hcl] Itching       PHYSICAL " "EXAM  VITAL SIGNS: /53   Pulse 69   Temp 36.9 °C (98.4 °F) (Temporal)   Resp 16   Ht 1.676 m (5' 6\")   Wt 94.1 kg (207 lb 7.3 oz)   SpO2 96%   BMI 33.48 kg/m²    Constitutional: Awake and alert. Nontoxic  HENT:  Grossly normal  Eyes: Grossly normal  Neck: Normal range of motion  Cardiovascular: Normal heart rate   Thorax & Lungs: No respiratory distress  Abdomen: Non-tender to palpation in all 4 quadrants.  No rebound or guarding  Skin: She has an open wound to the left upper quadrant with packing in place.  There is moderate serosanguineous drainage occasional turbid fluid. No fecal matter.  No periwound erythema, edema or induration.  Extremities: Well perfused  Psychiatric: Affect normal      DIAGNOSTIC STUDIES / PROCEDURES    EKG  Bradycardic rate, normal rhythm. Intervals within normal limits. No ST wave elevations or depressions.  My interpretation is sinus bradycardia      LABS  Results for orders placed or performed during the hospital encounter of 05/04/23   CBC with Differential   Result Value Ref Range    WBC 6.5 4.8 - 10.8 K/uL    RBC 3.96 (L) 4.20 - 5.40 M/uL    Hemoglobin 13.0 12.0 - 16.0 g/dL    Hematocrit 38.2 37.0 - 47.0 %    MCV 96.5 81.4 - 97.8 fL    MCH 32.8 27.0 - 33.0 pg    MCHC 34.0 33.6 - 35.0 g/dL    RDW 45.3 35.9 - 50.0 fL    Platelet Count 232 164 - 446 K/uL    MPV 10.3 9.0 - 12.9 fL    Neutrophils-Polys 54.10 44.00 - 72.00 %    Lymphocytes 38.10 22.00 - 41.00 %    Monocytes 5.50 0.00 - 13.40 %    Eosinophils 1.40 0.00 - 6.90 %    Basophils 0.60 0.00 - 1.80 %    Immature Granulocytes 0.30 0.00 - 0.90 %    Nucleated RBC 0.00 /100 WBC    Neutrophils (Absolute) 3.51 2.00 - 7.15 K/uL    Lymphs (Absolute) 2.47 1.00 - 4.80 K/uL    Monos (Absolute) 0.36 0.00 - 0.85 K/uL    Eos (Absolute) 0.09 0.00 - 0.51 K/uL    Baso (Absolute) 0.04 0.00 - 0.12 K/uL    Immature Granulocytes (abs) 0.02 0.00 - 0.11 K/uL    NRBC (Absolute) 0.00 K/uL   Complete Metabolic Panel   Result Value Ref Range    " Sodium 141 135 - 145 mmol/L    Potassium 4.3 3.6 - 5.5 mmol/L    Chloride 107 96 - 112 mmol/L    Co2 24 20 - 33 mmol/L    Anion Gap 10.0 7.0 - 16.0    Glucose 137 (H) 65 - 99 mg/dL    Bun 26 (H) 8 - 22 mg/dL    Creatinine 0.70 0.50 - 1.40 mg/dL    Calcium 9.3 8.5 - 10.5 mg/dL    AST(SGOT) 25 12 - 45 U/L    ALT(SGPT) 22 2 - 50 U/L    Alkaline Phosphatase 90 30 - 99 U/L    Total Bilirubin 0.2 0.1 - 1.5 mg/dL    Albumin 3.8 3.2 - 4.9 g/dL    Total Protein 7.0 6.0 - 8.2 g/dL    Globulin 3.2 1.9 - 3.5 g/dL    A-G Ratio 1.2 g/dL   Lipase   Result Value Ref Range    Lipase 52 11 - 82 U/L   CORRECTED CALCIUM   Result Value Ref Range    Correct Calcium 9.5 8.5 - 10.5 mg/dL   ESTIMATED GFR   Result Value Ref Range    GFR (CKD-EPI) 104 >60 mL/min/1.73 m 2   EKG   Result Value Ref Range    Report       [facility]    Test Date:  2023  Pt Name:    CHERRI SHAH                Department: ER  MRN:        [id number]                      Room:       [facility]  Gender:     Female                       Technician: [id number]  :        1970                   Requested By:CHALO VALDES  Order #:    [id number]                    Reading MD:    Measurements  Intervals                                Axis  Rate:       58                           P:          26  SC:         200                          QRS:        25  QRSD:       100                          T:          15  QT:         444  QTc:        437    Interpretive Statements  Sinus bradycardia  Compared to ECG 2023 08:49:15  No significant changes           COURSE & MEDICAL DECISION MAKING    ED Observation Status? Yes; I am placing the patient in to an observation status due to a diagnostic uncertainty as well as therapeutic intensity. Patient placed in observation status at 8:19 PM, 2023.     Observation plan is as follows: IV, Labs, Reevaluation    Upon Reevaluation, the patient's condition has: Improved; and will be  discharged.    Patient discharged from ED Observation status at 23.15 PM 5/4/2023    INITIAL ASSESSMENT, COURSE AND PLAN  Care Narrative: This is a 52-year-old female with a complex surgical history with a lap band removal in February by Dr. Gonzalez.  She has an open abdominal wound that has not been healing and is being followed closely as an outpatient by wound care.  She had a CT scan that was performed as an outpatient that queried the possibility of a fistula however unable to completely evaluate with IV contrast recommended obtaining with rectal contrast.  She was instructed to present to the ER in order to expedite surgical follow-up.  Specifically she said that she had been declined at [facility] as she has a previous surgeon with a different group.     She arrives with normal vital signs is afebrile and overall systemically well-appearing.  The wound is open with some turbid fluid but there is no surrounding erythema or fluctuance.  Her abdominal exam is very reassuring, non-tender, non peritonitic.  Labs are reassuring, no leukocytosis, no significant electrolyte derangements.  She is not septic.    20.41PM  I spoke with Dr. Kelsey with general surgery.  He did not think the patient had any indication for emergent surgery.  He was reassured that she did not have any fecal matter coming from the wound and therefore thought an enteric fistula would be very unlikely.  He thought that a CT scan with rectal contrast would be of little utility. He did not have any further recommendations and thought this could be further worked up as an outpatient     I had a long conversation with the patient regarding options going forwards.  She does not want to proceed with the CT with rectal contrast.  I did discuss with the inpatient hospitalist Dr. Marin regarding admission for wound care however given the chronicity of her symptoms,  he did not feel that she met criteria for hospitalization.     I again, had a long  discussion with the patient and my discussions with various consultants.  I explained that there is no indication for emergent intervention and she will need to have this further worked up as an outpatient.  I placed another referral to [facility] so that she can obtain follow-up.  I did also discuss with her surgeon Dr. Gonzalez who agrees with management and outpatient treatment plan.      Patient did have one episode of orthostatic hypotension in the ER.  She does have a documented history of this and has been referred to cardiology. She was very anxious regarding IV placement and was given oral Valium however still unable to get IV access.  She felt better after fluids and did not want IV fluids.  She was able to tolerate p.o. and her blood pressure improved.     She does continue to look overall systemically well.  Her boyfriend at bedside will take her home.  She understands the importance of close follow-up and actually has a wound care appointment tomorrow morning.  All questions were answered. She was discharged home in good condition.     DISPOSITION AND DISCUSSIONS  I have discussed management of the patient with the following physicians and NILAM's:  Dr. Kelsey (General surgery), Dr. Marin (hospitalist), Dr. Gonzalez    Discussion of management with other [facility] or appropriate source(s): None     Escalation of care considered, and ultimately not performed:acute inpatient care management, however at this time, the patient is most appropriate for outpatient management    Barriers to care at this time, including but not limited to:  Patient previously has been unable to follow-up with general surgery .     Decision tools and prescription drugs considered including, but not limited to: Antibiotics Not indicated .    FINAL DIAGNOSIS  1. Non-healing surgical wound, initial encounter Acute   2. Orthostatic hypotension Acute          Electronically signed by: Dena Hawkins M.D., 5/4/2023 8:16 PM

## 2023-05-05 NOTE — ED NOTES
Pt resting on gurney on phone. Pt reporting palpitations and is found to be hypotensive. ERP made aware. EKG ordered.

## 2023-05-05 NOTE — TELEPHONE ENCOUNTER
[facility] Will need new wound vac order if needing to resume.

## 2023-05-05 NOTE — ED NOTES
Pt resting comfortably on gurney, left lateral. Pt has no needs at this time. Call light within reach. Lights turned down, per request.

## 2023-05-06 NOTE — PATIENT INSTRUCTIONS
After Visit Summary Wound Care Instructions    Nutrition - Patient instructed increased protein diet unless contraindicated in renal failure (meat, eggs, fish, yogurt, cottage cheese, beans), use of multivitamin with minerals and Arginaid supplementation (check if ok with Primary Care Provider first).    Infection -  instructed signs and symptoms of infection, increased redness and swelling, localized heat over wound and surrounding area/fever/chills/nausea and vomiting, when to call doctor or go to Emergency Room.     Dressing Changes - Instructed patient rationale for wound care products. Instructed to keep dressings clean and dry, shower on clinic days right before coming in. Change your dressing if it becomes soiled, soaked, or falls off.      Dressing change procedure   Remove old dressing.  Cleanse the wound with saline and gauze.  Dry surrounding skin with gauze, then apply skin prep wipe, allow to dry, then apply zinc oxide barrier paste.  Cut a small piece of Aquacel silver dressing, and pack loosely into wound to fill the dead space and the undermining areas.   Cut a larger piece of Aquacel silver and place this on top.   Cover with foam dressing and secure with the hypafix tape if needed.   Change 1-2 x week and as needed at home, come to wound clinic 1 x week.   Change your dressing if it becomes soiled, soaked, or falls off.    Instructed patient about fall prevention.      Discharge Instructions - discharge today as wound is resolved; new skin tissue over wound area will be fragile for a few days, bathe and dry area gently, only ever regains a maximum of 80% of the tensile strength of the surrounding skin, remodeling of scar can continue for 6 months to a year. Contact Primary Care Provider for a referral back here if any problems with area opening and draining again.    Questions - should you have any questions regarding your home care instructions, please contact the wound center at [phone].  If after hours, contact your primary care physician or go to the hospital emergency room.

## 2023-05-08 ENCOUNTER — NON-PROVIDER VISIT (OUTPATIENT)
Dept: WOUND CARE | Facility: MEDICAL CENTER | Age: 53
End: 2023-05-08
Attending: STUDENT IN AN ORGANIZED HEALTH CARE EDUCATION/TRAINING PROGRAM
Payer: COMMERCIAL

## 2023-05-08 PROCEDURE — 97602 WOUND(S) CARE NON-SELECTIVE: CPT

## 2023-05-08 NOTE — PATIENT INSTRUCTIONS
-Keep your wound dressing clean, dry, and intact.    -Change your dressing if it becomes soiled, soaked, or falls off.    -Should you experience any significant changes in your wound(s), such as infection (redness, swelling, localized heat, increased pain, fever > 101 F, chills) or have any questions regarding your home care instructions, please contact the wound center at [phone]. If after hours, contact your primary care physician or go to the hospital emergency room.

## 2023-05-09 ENCOUNTER — APPOINTMENT (OUTPATIENT)
Dept: MEDICAL GROUP | Facility: MEDICAL CENTER | Age: 53
End: 2023-05-09
Payer: COMMERCIAL

## 2023-05-09 ENCOUNTER — TELEPHONE (OUTPATIENT)
Dept: HEALTH INFORMATION MANAGEMENT | Facility: OTHER | Age: 53
End: 2023-05-09

## 2023-05-10 ENCOUNTER — OFFICE VISIT (OUTPATIENT)
Dept: WOUND CARE | Facility: MEDICAL CENTER | Age: 53
End: 2023-05-10
Attending: STUDENT IN AN ORGANIZED HEALTH CARE EDUCATION/TRAINING PROGRAM
Payer: COMMERCIAL

## 2023-05-10 VITALS
HEART RATE: 86 BPM | SYSTOLIC BLOOD PRESSURE: 102 MMHG | RESPIRATION RATE: 16 BRPM | DIASTOLIC BLOOD PRESSURE: 69 MMHG | TEMPERATURE: 97.7 F | OXYGEN SATURATION: 97 %

## 2023-05-10 DIAGNOSIS — T81.89XD NON-HEALING SURGICAL WOUND, SUBSEQUENT ENCOUNTER: ICD-10-CM

## 2023-05-10 DIAGNOSIS — Z98.84 HISTORY OF REMOVAL OF LAPAROSCOPIC GASTRIC BANDING DEVICE: ICD-10-CM

## 2023-05-10 PROCEDURE — 99214 OFFICE O/P EST MOD 30 MIN: CPT | Performed by: NURSE PRACTITIONER

## 2023-05-10 PROCEDURE — 99213 OFFICE O/P EST LOW 20 MIN: CPT

## 2023-05-10 ASSESSMENT — ENCOUNTER SYMPTOMS
CHILLS: 1
COUGH: 0
NAUSEA: 0
VOMITING: 0
DIARRHEA: 0
SHORTNESS OF BREATH: 0
NERVOUS/ANXIOUS: 1
FEVER: 0
CONSTIPATION: 1

## 2023-05-10 NOTE — PROGRESS NOTES
Provider Encounter- Full Thickness wound    HISTORY OF PRESENT ILLNESS  Wound History:    START OF CARE IN CLINIC: 4/21/2023    REFERRING PROVIDER: Jyoti Kothari      WOUND- Full Thickness Wound   LOCATION: Left Upper Quadrant Full thickness wound   HISTORY:  52F with PMHx of Anxiety, depression, Hx lyme disease, Hx hypothyroidism, obesity. Patient had Lap Band removal with Dr. Gonzalez 2/22/2023 due to complications and not tolerating. According to patient her port site was left open to heal by secondary intention and was treated with wound VAC in Dr. Gonzalez's clinic. She was unhappy with lack of progress and felt that she was not receiving adequate care. Patient was referred to [facility].    Pertinent Medical History: Anxiety, Hx depression,  Hypothyroidism, Hx lyme disease, obesity    TOBACCO USE:  Denies use    Patient's problem list, allergies, and current medications reviewed and updated in Epic    Interval History:  4/21/2023: Clinic visit with Amari Vaughan MD. Patient reports that she is feeling ok, but is incredibly frusturated by lack of progress of wound and care she was receiving at surgeon's office. Patient had Lap Band removal due to complications and not tolerating on 2/22/2023 with Dr. Gonzalez. He left her port site open to heal by secondary intention. She was treated with wound VAC without resolution. Reports recently completed course of Bactrim due to concerns for infection. Wound has undermining with small tunnel which is not amenable to wound VAC today. Patient is currently working and not candidate for home health.    4/26/2023: Clinic visit with Amari Vaughan MD. Patient reports doing well. She went on walk recent and reported increased drainage following walk. She was questioning if she can do minor exercises, I recommend she walk and stay active, recommend against doing abdominal exercises or weight lifting currently. Patient brought wound VAC and supplies today and is prepared for excisional  "debridement of wound with injection of subcutaneous lidocaine to open wound to allow wound VAC to base of wound.    5/3/2023: Clinic visit with BRIDGETT John.  The depth of the wound has increased.  The wound has not progressed depth wise in three months.  Patient reports some generalized fatigue.  Occasional night sweats which may be associated with menopause.  Patient doesn't have increased pain at this time the drainage out of the wound bed is serosanguineous within the wound VAC canister.  There is no periwound erythema or edema.  However, patient does have a air pocket inferiorly to the wound with deep palpation you can hear the air released into the wound bed.  Therefore I will order a CT with contrast to determine any underlying pathology to the nonhealing wound.    5/10/2023 : Clinic visit with BRIDGETT Walker, MARY, EDWINN, ERENDIRA.  Patient was seen in the ED last week on 5/4 due to concerning findings on CT.  An attempt had been made to refer her to [facility] from the wound clinic, however they would not see her.  Patient is adamant that she does not want to follow-up with her original surgeon, Dr. Gonzalez.  Due to concerns about not being able to obtain follow-up she was advised to present to the emergency room.   CT  from 5/3 \" 5.0 x 2.9 cm open subcutaneous soft tissue defect defect containing gas and fluid in the left anterolateral abdominal. There is no definite evidence of fistulous connection with adjacent bowel.   It was recommended that if there were strong evidence of fistula, that a CT with rectal contrast to be done.   While in the ED, Dr. Kelsey with general surgery was consulted.  He did not feel patient needed to surgery, and based on wound assessment he did not feel an enteric fistula was likely.  Nonetheless, CT with rectal contrast was offered, patient declined.  Patient did not meet criteria for admission, and therefore was discharged home.  Another referral was placed to " "hospitals.  Patient is emotional today, worried that although her wound appears to be healing, \"there is something brewing inside\".  She is very worried about previous CT findings, adamant that she does not want a colostomy.  She is demanding that another CT to be done.  She denies any fevers, but states that she is always fatigued, and often chilled.  She is eating a regular diet.  Reports that she had an unusual bowel movement this morning, of which she showed me a photo of on her phone.   After patient left clinic, I discussed case with our medical director, Dr. Dow.  He did not feel another CT was indicated at this point as she just a week ago.  However he did contact nurse manager at hospitals who stated she would speak with several of their surgeons to see if they would reconsider and see patient for second opinion.      Review of Systems   Constitutional:  Positive for chills and malaise/fatigue. Negative for fever.        States she often feels cold  States she is always fatigued   Respiratory:  Negative for cough and shortness of breath.    Cardiovascular:  Negative for chest pain.   Gastrointestinal:  Positive for constipation. Negative for diarrhea, nausea and vomiting.        Sometimes has constipation  Reports yesterday she had an unusual bowel movement, \"covered with a film\".   Psychiatric/Behavioral:  The patient is nervous/anxious.         Admits that she is very worried about her wound worsening, and that she may require colostomy at some point.          PHYSICAL EXAMINATION:   /69 (BP Location: Left arm, Patient Position: Sitting)   Pulse 86   Temp 36.5 °C (97.7 °F) (Temporal)   Resp 16   SpO2 97%     Physical Exam  Cardiovascular:      Rate and Rhythm: Normal rate.   Pulmonary:      Effort: Pulmonary effort is normal.   Abdominal:      Palpations: Abdomen is soft.   Skin:     Comments: Open wound left upper quadrant -full-thickness, depth of tract has decreased since " last assessment, any noted, moderate serosanguineous drainage, no purulence, no periwound erythema or induration     Neurological:      Mental Status: She is alert.   Psychiatric:      Comments: She is anxious, tearful at times.         WOUND ASSESSMENT  Wound 02/22/23 Incision Abdomen LUQ abdomen (Active)   Wound Image   05/03/23 0900   Site Assessment Red;Granulation tissue 05/08/23 0800   Periwound Assessment Intact 05/08/23 0800   Margins Unattached edges 05/08/23 0800   Closure Secondary intention 05/05/23 0700   Drainage Amount Moderate 05/08/23 0800   Drainage Description Serosanguineous 05/08/23 0800   Treatments Cleansed;Site care 05/08/23 0800   Wound Cleansing Pur[place]yn [place] 05/08/23 0800   Periwound Protectant Skin Protectant Wipes to Periwound 05/08/23 0800   Dressing Cleansing/Solutions Not Applicable 05/08/23 0800   Dressing Options Hydrofiber Silver Strip;Hydrofiber Silver;Silicone Adhesive Foam 05/08/23 0800   Dressing Changed Changed 05/08/23 0800   Dressing Status Clean;Dry;Intact 04/26/23 0900   Dressing Change/Treatment Frequency Monday, Wednesday, Friday, and As Needed 05/08/23 0800   Non-staged Wound Description Full thickness 05/08/23 0800   Wound Length (cm) 0.5 cm 05/03/23 0900   Wound Width (cm) 1.5 cm 05/03/23 0900   Wound Depth (cm) 5.7 cm 05/03/23 0900   Wound Surface Area (cm^2) 0.75 cm^2 05/03/23 0900   Wound Volume (cm^3) 4.275 cm^3 05/03/23 0900   Post-Procedure Length (cm) 0.8 cm 04/26/23 0900   Post-Procedure Width (cm) 1.9 cm 04/26/23 0900   Post-Procedure Depth (cm) 4.5 cm 04/26/23 0900   Post-Procedure Surface Area (cm^2) 1.52 cm^2 04/26/23 0900   Post-Procedure Volume (cm^3) 6.84 cm^3 04/26/23 0900   Wound Healing % 58 05/03/23 0900   Tunneling (cm) 0 cm 05/08/23 0800   Undermining (cm) 0 cm 05/08/23 0800   Wound Odor None 05/08/23 0800   Exposed Structures CAROLINE 05/08/23 0800   Number of days: 77       Procedure: No excisional debridement required in clinic today.  -Wound  flushed thoroughly with normal saline  -Wound care by RN    Pertinent Labs and Diagnostics:    Labs:     A1c: No results found for: HBA1C       IMAGING: None    VASCULAR STUDIES: None    LAST  WOUND CULTURE:  DATE : No results found for: CULTRSULT      ASSESSMENT AND PLAN:     1. Non-healing surgical wound, subsequent encounter  2. History of removal of laparoscopic gastric banding device  Comments: Had removal of Lap Band by Dr. Gonzalez 2/2023, apparently left abdominal port site open to heal by secondary intention    5/10/2023: Patient was seen in ED on 5/4, was not admitted.  General surgery consulted, did not feel surgery indicated.  Referrals to [facility] and [facility] have been declined thus far.  Patient adamant that she does not want to see Dr. Gonzalez again.  -No debridement today  -Wound depth has decreased since last assessment, I did not find any undermining or pocket  -Patient has returned VAC.  This seems reasonable as she has not had much progress over the past 3 months with this modality, and also due to concern for possible fistula formation  -Patient is currently not on any antibiotics.  Her wound does not appear to be infected today  -Patient to return to clinic in 2 days.  She is to change outer dressing only as needed for saturation.  -Discussed patient's case with wound clinic medical director, , who contacted nurse manager at [facility] regarding getting patient a second opinion.  Nurse manager to discuss case with 2 of their surgeons, and will get back to Dr. Crooks.  I relayed this information to patient, and also informed her that at this time I would not be ordering another CT as she just had one a week ago.   Wound Care: Hydrofiber silver strip, Hydrofiber silver, silicone adhesive foam, Hypafix tape      PATIENT EDUCATION  - Importance of adequate nutrition for wound healing  -Advised to go to ER for any increased redness, swelling, drainage, or odor, or if  patient develops fever, chills, nausea or vomiting.     My total time spent caring for the patient on the day of the encounter was 30 minutes.   This does not include time spent on separately billable procedures/tests.       Please note that this note may have been created using voice recognition software. I have worked with technical experts from [facility] to optimize the interface.  I have made every reasonable attempt to correct obvious errors, but there may be errors of grammar and possibly content that I did not discover before finalizing the note.    N

## 2023-05-10 NOTE — PATIENT INSTRUCTIONS
-Keep your wound dressing clean, dry, and intact.    -Change your dressing if it becomes soiled, soaked, or falls off.      -Should you experience any significant changes in your wound(s), such as infection (redness, swelling, localized heat, increased pain, fever > 101 F, chills) or have any questions regarding your home care instructions, please contact the wound center at [phone]. If after hours, contact your primary care physician or go to the hospital emergency room.

## 2023-05-12 ENCOUNTER — NON-PROVIDER VISIT (OUTPATIENT)
Dept: WOUND CARE | Facility: MEDICAL CENTER | Age: 53
End: 2023-05-12
Attending: STUDENT IN AN ORGANIZED HEALTH CARE EDUCATION/TRAINING PROGRAM
Payer: COMMERCIAL

## 2023-05-12 PROCEDURE — 97602 WOUND(S) CARE NON-SELECTIVE: CPT

## 2023-05-12 NOTE — PATIENT INSTRUCTIONS
-Keep your wound dressing clean, dry, and intact.    -Change your dressing daily and if it becomes soiled, soaked, or falls off.    -Should you experience any significant changes in your wound(s), such as infection (redness, swelling, localized heat, increased pain, fever > 101 F, chills) or have any questions regarding your home care instructions, please contact the wound center at [phone]. If after hours, contact your primary care physician or go to the hospital emergency room.

## 2023-05-15 ENCOUNTER — APPOINTMENT (OUTPATIENT)
Dept: WOUND CARE | Facility: MEDICAL CENTER | Age: 53
End: 2023-05-15
Attending: STUDENT IN AN ORGANIZED HEALTH CARE EDUCATION/TRAINING PROGRAM
Payer: COMMERCIAL

## 2023-05-15 ENCOUNTER — TELEPHONE (OUTPATIENT)
Dept: WOUND CARE | Facility: MEDICAL CENTER | Age: 53
End: 2023-05-15
Payer: COMMERCIAL

## 2023-05-15 NOTE — TELEPHONE ENCOUNTER
Patient reached out to clinic today because she noticed more blood on the dressing material than usual.     States that she did the packing while standing and make have scraped or poked the wound while the packing was going in.     Educated patient on the s/s of excessive bleeding and when to seek emergency medical attention vs. calling clinic. Patient able to verbalize when dressing is saturated or exudate on exterior dressing is larger than a quarter to call back.     ** Patient is not covered under [facility] services and has had trouble affording dressings. She is currently using plain gauze and tape. Assured patient that this is a sufficient secondary dressing. Can discuss additional options at next clinic appointment.

## 2023-05-17 ENCOUNTER — APPOINTMENT (OUTPATIENT)
Dept: WOUND CARE | Facility: MEDICAL CENTER | Age: 53
End: 2023-05-17
Attending: STUDENT IN AN ORGANIZED HEALTH CARE EDUCATION/TRAINING PROGRAM
Payer: COMMERCIAL

## 2023-05-19 ENCOUNTER — NON-PROVIDER VISIT (OUTPATIENT)
Dept: WOUND CARE | Facility: MEDICAL CENTER | Age: 53
End: 2023-05-19
Attending: STUDENT IN AN ORGANIZED HEALTH CARE EDUCATION/TRAINING PROGRAM
Payer: COMMERCIAL

## 2023-05-19 PROCEDURE — 99211 OFF/OP EST MAY X REQ PHY/QHP: CPT

## 2023-05-19 NOTE — PROGRESS NOTES
Per chart review patient's supply order was forwarded from [facility] to [facility]. Telephone call to [facility] #[phone]. Representative states that patient's insurance is not contracted for wound care supplies.   Telephone call to patient's listed number, call went straight to voicemail. Unable to leave a message to inform patient of above, voicemail box is full.

## 2023-05-22 ENCOUNTER — APPOINTMENT (OUTPATIENT)
Dept: WOUND CARE | Facility: MEDICAL CENTER | Age: 53
End: 2023-05-22
Attending: STUDENT IN AN ORGANIZED HEALTH CARE EDUCATION/TRAINING PROGRAM
Payer: COMMERCIAL

## 2023-05-24 ENCOUNTER — APPOINTMENT (OUTPATIENT)
Dept: WOUND CARE | Facility: MEDICAL CENTER | Age: 53
End: 2023-05-24
Attending: STUDENT IN AN ORGANIZED HEALTH CARE EDUCATION/TRAINING PROGRAM
Payer: COMMERCIAL

## 2023-05-24 NOTE — PROGRESS NOTES
Called Pt to inform her that her insurance does not cover wound care supplies.  She said she ordered supplies herself and is doing well with them.

## 2023-05-25 ENCOUNTER — OFFICE VISIT (OUTPATIENT)
Dept: WOUND CARE | Facility: MEDICAL CENTER | Age: 53
End: 2023-05-25
Attending: STUDENT IN AN ORGANIZED HEALTH CARE EDUCATION/TRAINING PROGRAM
Payer: COMMERCIAL

## 2023-05-25 VITALS
RESPIRATION RATE: 16 BRPM | DIASTOLIC BLOOD PRESSURE: 69 MMHG | HEART RATE: 63 BPM | OXYGEN SATURATION: 99 % | SYSTOLIC BLOOD PRESSURE: 114 MMHG | TEMPERATURE: 97.5 F

## 2023-05-25 DIAGNOSIS — T81.89XD NON-HEALING SURGICAL WOUND, SUBSEQUENT ENCOUNTER: ICD-10-CM

## 2023-05-25 DIAGNOSIS — Z98.84 HISTORY OF REMOVAL OF LAPAROSCOPIC GASTRIC BANDING DEVICE: ICD-10-CM

## 2023-05-25 PROCEDURE — 99213 OFFICE O/P EST LOW 20 MIN: CPT

## 2023-05-25 PROCEDURE — 3074F SYST BP LT 130 MM HG: CPT | Performed by: NURSE PRACTITIONER

## 2023-05-25 PROCEDURE — 99213 OFFICE O/P EST LOW 20 MIN: CPT | Performed by: NURSE PRACTITIONER

## 2023-05-25 PROCEDURE — 3078F DIAST BP <80 MM HG: CPT | Performed by: NURSE PRACTITIONER

## 2023-05-25 ASSESSMENT — ENCOUNTER SYMPTOMS
CHILLS: 0
CONSTIPATION: 0
FEVER: 0
SHORTNESS OF BREATH: 0
VOMITING: 0
NERVOUS/ANXIOUS: 0
NAUSEA: 0
COUGH: 0

## 2023-05-25 NOTE — PROGRESS NOTES
Provider Encounter- Full Thickness wound    HISTORY OF PRESENT ILLNESS  Wound History:    START OF CARE IN CLINIC: 4/21/2023    REFERRING PROVIDER: Jyoti Kothari      WOUND- Full Thickness Wound   LOCATION: Left Upper Quadrant Full thickness wound   HISTORY:  52F with PMHx of Anxiety, depression, Hx lyme disease, Hx hypothyroidism, obesity. Patient had Lap Band removal with Dr. Gonzalez 2/22/2023 due to complications and not tolerating. According to patient her port site was left open to heal by secondary intention and was treated with wound VAC in Dr. Gonzalez's clinic. She was unhappy with lack of progress and felt that she was not receiving adequate care. Patient was referred to [facility].    Pertinent Medical History: Anxiety, Hx depression,  Hypothyroidism, Hx lyme disease, obesity    TOBACCO USE:  Denies use    Patient's problem list, allergies, and current medications reviewed and updated in Epic    Interval History:  4/21/2023: Clinic visit with Amari Vaughan MD. Patient reports that she is feeling ok, but is incredibly frusturated by lack of progress of wound and care she was receiving at surgeon's office. Patient had Lap Band removal due to complications and not tolerating on 2/22/2023 with Dr. Gonzalez. He left her port site open to heal by secondary intention. She was treated with wound VAC without resolution. Reports recently completed course of Bactrim due to concerns for infection. Wound has undermining with small tunnel which is not amenable to wound VAC today. Patient is currently working and not candidate for home health.    4/26/2023: Clinic visit with Amari Vaughan MD. Patient reports doing well. She went on walk recent and reported increased drainage following walk. She was questioning if she can do minor exercises, I recommend she walk and stay active, recommend against doing abdominal exercises or weight lifting currently. Patient brought wound VAC and supplies today and is prepared for excisional  "debridement of wound with injection of subcutaneous lidocaine to open wound to allow wound VAC to base of wound.    5/3/2023: Clinic visit with BRIDGETT John.  The depth of the wound has increased.  The wound has not progressed depth wise in three months.  Patient reports some generalized fatigue.  Occasional night sweats which may be associated with menopause.  Patient doesn't have increased pain at this time the drainage out of the wound bed is serosanguineous within the wound VAC canister.  There is no periwound erythema or edema.  However, patient does have a air pocket inferiorly to the wound with deep palpation you can hear the air released into the wound bed.  Therefore I will order a CT with contrast to determine any underlying pathology to the nonhealing wound.    5/10/2023 : Clinic visit with BRIDGETT Walker, MARY, EDWINN, ERENDIRA.  Patient was seen in the ED last week on 5/4 due to concerning findings on CT.  An attempt had been made to refer her to [facility] from the wound clinic, however they would not see her.  Patient is adamant that she does not want to follow-up with her original surgeon, Dr. Gonzalez.  Due to concerns about not being able to obtain follow-up she was advised to present to the emergency room.   CT  from 5/3 \" 5.0 x 2.9 cm open subcutaneous soft tissue defect defect containing gas and fluid in the left anterolateral abdominal. There is no definite evidence of fistulous connection with adjacent bowel.   It was recommended that if there were strong evidence of fistula, that a CT with rectal contrast to be done.   While in the ED, Dr. Kelsey with general surgery was consulted.  He did not feel patient needed to surgery, and based on wound assessment he did not feel an enteric fistula was likely.  Nonetheless, CT with rectal contrast was offered, patient declined.  Patient did not meet criteria for admission, and therefore was discharged home.  Another referral was placed to " "[facility].  Patient is emotional today, worried that although her wound appears to be healing, \"there is something brewing inside\".  She is very worried about previous CT findings, adamant that she does not want a colostomy.  She is demanding that another CT to be done.  She denies any fevers, but states that she is always fatigued, and often chilled.  She is eating a regular diet.  Reports that she had an unusual bowel movement this morning, of which she showed me a photo of on her phone.   After patient left clinic, I discussed case with our medical director, Dr. Dow.  He did not feel another CT was indicated at this point as she just a week ago.  However he did contact nurse manager at [facility] who stated she would speak with several of their surgeons to see if they would reconsider and see patient for second opinion.    5/25/2023 : Clinic visit with BRIDGETT Walker, MARY, NAYA, ERENDIRA.   She states she is feeling very well.  She is exercising again, and continues to take supplements to enhance wound healing.  She has been changing her dressing daily, reports quite a bit of drainage as she has become more active.  Depth has increased just slightly today.  There is no evidence of infection.      Review of Systems   Constitutional:  Negative for chills, fever and malaise/fatigue.   Respiratory:  Negative for cough and shortness of breath.    Cardiovascular:  Negative for chest pain.   Gastrointestinal:  Negative for constipation, nausea and vomiting.   Psychiatric/Behavioral:  The patient is not nervous/anxious.         She is much less anxious about this wound, more optimistic that will heal.          PHYSICAL EXAMINATION:   /69 (BP Location: Left arm, Patient Position: Sitting)   Pulse 63   Temp 36.4 °C (97.5 °F) (Temporal)   Resp 16   SpO2 99%     Physical Exam  Cardiovascular:      Rate and Rhythm: Normal rate.   Pulmonary:      Effort: Pulmonary effort is normal.   Abdominal: "      Palpations: Abdomen is soft.   Skin:     Comments: Open wound left upper quadrant -outer opening has decreased, depth slightly increased.  Serosanguineous drainage, no periwound induration or erythema.    Refer to wound flowsheet and photos     Neurological:      Mental Status: She is alert.   Psychiatric:      Comments: She is anxious, tearful at times.         WOUND ASSESSMENT  Wound 02/22/23 Incision Abdomen LUQ abdomen (Active)   Wound Image   05/25/23 0900   Site Assessment Pink;Red 05/25/23 0900   Periwound Assessment Scar tissue 05/25/23 0900   Margins Unattached edges 05/25/23 0900   Closure Secondary intention 05/05/23 0700   Drainage Amount Moderate 05/25/23 0900   Drainage Description Serosanguineous 05/25/23 0900   Treatments Cleansed;Site care 05/25/23 0900   Wound Cleansing Normal Saline Irrigation 05/25/23 0900   Periwound Protectant Skin Protectant Wipes to Periwound 05/25/23 0900   Dressing Cleansing/Solutions Not Applicable 05/25/23 0900   Dressing Options Silver Strip Packing;Dry Gauze;Nonadhesive Foam;Hypafix Tape 05/25/23 0900   Dressing Changed Changed 05/25/23 0900   Dressing Status Clean;Dry;Intact 04/26/23 0900   Dressing Change/Treatment Frequency Daily, and As Needed 05/25/23 0900   Non-staged Wound Description Full thickness 05/25/23 0900   Wound Length (cm) 0.1 cm 05/25/23 0900   Wound Width (cm) 0.3 cm 05/25/23 0900   Wound Depth (cm) 3.7 cm 05/25/23 0900   Wound Surface Area (cm^2) 0.03 cm^2 05/25/23 0900   Wound Volume (cm^3) 0.111 cm^3 05/25/23 0900   Post-Procedure Length (cm) 0.8 cm 04/26/23 0900   Post-Procedure Width (cm) 1.9 cm 04/26/23 0900   Post-Procedure Depth (cm) 4.5 cm 04/26/23 0900   Post-Procedure Surface Area (cm^2) 1.52 cm^2 04/26/23 0900   Post-Procedure Volume (cm^3) 6.84 cm^3 04/26/23 0900   Wound Healing % 99 05/25/23 0900   Tunneling (cm) 0 cm 05/25/23 0900   Undermining (cm) 0 cm 05/25/23 0900   Wound Odor None 05/25/23 0900   Exposed Structures CAROLINE  05/25/23 0900   Number of days: 92       Procedure: Excisional debridement was not required today  -Wound flushed thoroughly with normal saline  -Wound care by RN    Pertinent Labs and Diagnostics:    Labs:     A1c: No results found for: HBA1C       IMAGING: None    VASCULAR STUDIES: None    LAST  WOUND CULTURE:  DATE : No results found for: CULTRSULT      ASSESSMENT AND PLAN:     1. Non-healing surgical wound, subsequent encounter  2. History of removal of laparoscopic gastric banding device  Comments: Had removal of Lap Band by Dr. Gonzalez 2/2023, apparently left abdominal port site open to heal by secondary intention    5/25/2023: Outer opening of wound continues to decrease, depth is slightly increased today.  Overall however this wound is improving.  Patient has been changing dressing at least daily, reports increased drainage as she has become more active.  -No debridement required  -Wound depth has increased slightly since last assessment.  I did not note any undermining or tract  -Her wound does not appear to be infected.  She is not on any antibiotics  -Patient to continue changing her dressing at least daily.  Reinforced that she is not to pack the wound, but to gently fill the wound, using strip packing as a wick     Wound Care: Silver strip packing, gauze cover dressing, Hypafix tape      PATIENT EDUCATION  - Importance of adequate nutrition for wound healing  -Advised to go to ER for any increased redness, swelling, drainage, or odor, or if patient develops fever, chills, nausea or vomiting.     My total time spent caring for the patient on the day of the encounter was 20 minutes.   This does not include time spent on separately billable procedures/tests.       Please note that this note may have been created using voice recognition software. I have worked with technical experts from [employer] to optimize the interface.  I have made every reasonable attempt to correct obvious errors, but there may be  errors of grammar and possibly content that I did not discover before finalizing the note.    N

## 2023-05-25 NOTE — PATIENT INSTRUCTIONS
-Keep dressings clean and dry. Change dressings every daily, and if the dressings become saturated, soiled, or fall off.    -Should you experience any significant changes in your wound(s), such as signs of infection (increasing redness, swelling, localized heat, increased pain, fever > 101 F, chills) or have any questions regarding your home care instructions, please contact the wound center at [phone]. If after hours, contact your primary care physician or go to the hospital emergency room.     -If you are 5 or more minutes late for an appointment, we reserve the right to cancel and reschedule that appointment. Additionally, if you are habitually late or not showing (3 late cancellations and/or no shows), we reserve the right to cancel your remaining appointments and it will be your responsibility to obtain a new referral if services are still needed.

## 2023-05-26 ENCOUNTER — APPOINTMENT (OUTPATIENT)
Dept: WOUND CARE | Facility: MEDICAL CENTER | Age: 53
End: 2023-05-26
Attending: STUDENT IN AN ORGANIZED HEALTH CARE EDUCATION/TRAINING PROGRAM
Payer: COMMERCIAL

## 2023-05-31 ENCOUNTER — TELEMEDICINE (OUTPATIENT)
Dept: MEDICAL GROUP | Facility: MEDICAL CENTER | Age: 53
End: 2023-05-31
Payer: COMMERCIAL

## 2023-05-31 VITALS — BODY MASS INDEX: 33.27 KG/M2 | WEIGHT: 207 LBS | HEIGHT: 66 IN

## 2023-05-31 DIAGNOSIS — F33.2 SEVERE EPISODE OF RECURRENT MAJOR DEPRESSIVE DISORDER, WITHOUT PSYCHOTIC FEATURES (HCC): ICD-10-CM

## 2023-05-31 DIAGNOSIS — Z12.31 ENCOUNTER FOR SCREENING MAMMOGRAM FOR MALIGNANT NEOPLASM OF BREAST: ICD-10-CM

## 2023-05-31 DIAGNOSIS — M85.672 BONE CYST OF LEFT ANKLE: ICD-10-CM

## 2023-05-31 PROCEDURE — 99214 OFFICE O/P EST MOD 30 MIN: CPT | Mod: 95 | Performed by: NURSE PRACTITIONER

## 2023-05-31 ASSESSMENT — PATIENT HEALTH QUESTIONNAIRE - PHQ9
SUM OF ALL RESPONSES TO PHQ9 QUESTIONS 1 AND 2: 0
8. MOVING OR SPEAKING SO SLOWLY THAT OTHER PEOPLE COULD HAVE NOTICED. OR THE OPPOSITE, BEING SO FIGETY OR RESTLESS THAT YOU HAVE BEEN MOVING AROUND A LOT MORE THAN USUAL: NOT AT ALL
6. FEELING BAD ABOUT YOURSELF - OR THAT YOU ARE A FAILURE OR HAVE LET YOURSELF OR YOUR FAMILY DOWN: NOT AL ALL
SUM OF ALL RESPONSES TO PHQ QUESTIONS 1-9: 0
3. TROUBLE FALLING OR STAYING ASLEEP OR SLEEPING TOO MUCH: NOT AT ALL
7. TROUBLE CONCENTRATING ON THINGS, SUCH AS READING THE NEWSPAPER OR WATCHING TELEVISION: NOT AT ALL
4. FEELING TIRED OR HAVING LITTLE ENERGY: NOT AT ALL
1. LITTLE INTEREST OR PLEASURE IN DOING THINGS: NOT AT ALL
9. THOUGHTS THAT YOU WOULD BE BETTER OFF DEAD, OR OF HURTING YOURSELF: NOT AT ALL
2. FEELING DOWN, DEPRESSED, IRRITABLE, OR HOPELESS: NOT AT ALL
5. POOR APPETITE OR OVEREATING: NOT AT ALL

## 2023-05-31 ASSESSMENT — FIBROSIS 4 INDEX: FIB4 SCORE: 1.17

## 2023-05-31 NOTE — ASSESSMENT & PLAN NOTE
New problem today.  This is a chronic problem for patient.  Noted months ago.  Has been following with podiatry needs a new referral to [facility] in [place].

## 2023-05-31 NOTE — ASSESSMENT & PLAN NOTE
Chronic and stable.  On Celexa 20 mg is working well.  Situational depression related to recent wound healing issues after bariatric surgery.  Otherwise she denies SI and she is doing well without any additional interventions at this time.

## 2023-05-31 NOTE — PROGRESS NOTES
Virtual Visit: Established Patient   This visit was conducted via Zoom using secure and encrypted videoconferencing technology.   The patient was in their home in the [facility].    The patient's identity was confirmed and verbal consent was obtained for this virtual visit.     Subjective:   CC:   Chief Complaint   Patient presents with    Referral Needed     podiatrist       Leigh Whitley is a 52 y.o. female presenting for evaluation and management of:    Bone cyst of left ankle  New problem today.  This is a chronic problem for patient.  Noted months ago.  Has been following with podiatry needs a new referral to [facility] in [place].    Severe episode of recurrent major depressive disorder, without psychotic features (HCC)  Chronic and stable.  On Celexa 20 mg is working well.  Situational depression related to recent wound healing issues after bariatric surgery.  Otherwise she denies SI and she is doing well without any additional interventions at this time.     ROS     Current medicines (including changes today)  Current Outpatient Medications   Medication Sig Dispense Refill    Non Formulary Request Take 1 Capsule by mouth every day. Lions Jong Mushroom Capsules      therapeutic multivitamin-minerals (THERAGRAN-M) Tab Take 1 Tablet by mouth every day.      COLLAGEN PO Take 1 Capsule by mouth every day.      brimonidine (ALPHAGAN) 0.2 % Solution Administer 1 Drop into the right eye 2 times a day.      citalopram (CELEXA) 20 MG Tab Take 1 tablet by mouth every morning. 90 Tablet 0    vitamin D2, Ergocalciferol, (DRISDOL) 1.25 MG (99165 UT) Cap capsule Take 1 Capsule by mouth every 7 days. 12 Capsule 3    levothyroxine (SYNTHROID) 88 MCG Tab Take 1 Tablet by mouth every morning on an empty stomach. 90 Tablet 1     No current facility-administered medications for this visit.       Patient Active Problem List    Diagnosis Date Noted    Bone cyst of left ankle 05/31/2023    S/P bariatric  "surgery 04/27/2023    Weakness 04/27/2023    Obstructive sleep apnea syndrome 11/09/2022    Snoring 09/09/2022    Vitamin D deficiency 09/09/2022    Abnormal sensation of eye 08/31/2022    Onychocryptosis 05/27/2022    Mixed hyperlipidemia 07/01/2021    History of stroke 05/14/2021    Panic attacks 05/14/2021    Severe episode of recurrent major depressive disorder, without psychotic features (HCC) 01/29/2020    ADD (attention deficit disorder) without hyperactivity 02/09/2018    Status post bariatric surgery 02/09/2018    GERD (gastroesophageal reflux disease) 02/09/2018    Environmental allergies 08/30/2017    Obesity (BMI 30-39.9) 08/30/2017    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  Skin: No rashes in visible areas.  Eye: Round. Conjunctiva clear, lids normal. No icterus.   ENMT: Lips pink without lesions, good dentition, moist mucous membranes. Phonation normal.  Neck: No masses, no thyromegaly. Moves freely without pain.  Respiratory: Unlabored respiratory effort, no cough or audible wheeze  Psych: Alert and oriented x3, normal affect and mood.     Assessment and Plan:   The following treatment plan was discussed:   1. Bone cyst of left ankle  - Referral to Podiatry    2. Encounter for screening mammogram for malignant neoplasm of breast  - MA-SCREENING MAMMO BILAT W/TOMOSYNTHESIS W/CAD; Future    3. Severe episode of recurrent major depressive disorder, without psychotic features (HCC)  Stable on current regimen.  Continue.        Discussed with patient possible alternative diagnoses, patient is to take all medications as prescribed.      If symptoms persist FU w/PCP, if symptoms worsen go to emergency room.      If " experiencing any side effects from prescribed medications report to the office immediately or go to emergency room.     Reviewed indication, dosage, usage and potential adverse effects of prescribed medications.      Reviewed risks and benefits of treatment plan. Patient verbalizes understanding of all instruction and verbally agrees to plan.     Discussed plan with the patient, and patient agrees to the above.      I personally reviewed prior external notes and test results pertinent to today's visit.     Follow-up: 2 wks for lab f/u

## 2023-06-01 DIAGNOSIS — F41.9 ANXIETY: ICD-10-CM

## 2023-06-01 DIAGNOSIS — F41.0 PANIC ATTACKS: ICD-10-CM

## 2023-06-01 RX ORDER — CITALOPRAM 20 MG/1
TABLET ORAL
Qty: 90 TABLET | Refills: 0 | Status: SHIPPED | OUTPATIENT
Start: 2023-06-01 | End: 2023-06-07

## 2023-06-02 ENCOUNTER — NON-PROVIDER VISIT (OUTPATIENT)
Dept: WOUND CARE | Facility: MEDICAL CENTER | Age: 53
End: 2023-06-02
Attending: STUDENT IN AN ORGANIZED HEALTH CARE EDUCATION/TRAINING PROGRAM
Payer: COMMERCIAL

## 2023-06-02 ENCOUNTER — HOSPITAL ENCOUNTER (OUTPATIENT)
Dept: LAB | Facility: MEDICAL CENTER | Age: 53
End: 2023-06-02
Attending: NURSE PRACTITIONER
Payer: COMMERCIAL

## 2023-06-02 DIAGNOSIS — Z00.00 PE (PHYSICAL EXAM), ANNUAL: ICD-10-CM

## 2023-06-02 LAB
25(OH)D3 SERPL-MCNC: 57 NG/ML (ref 30–100)
ALBUMIN SERPL BCP-MCNC: 4.1 G/DL (ref 3.2–4.9)
ALBUMIN/GLOB SERPL: 1.5 G/DL
ALP SERPL-CCNC: 73 U/L (ref 30–99)
ALT SERPL-CCNC: 18 U/L (ref 2–50)
ANION GAP SERPL CALC-SCNC: 12 MMOL/L (ref 7–16)
AST SERPL-CCNC: 22 U/L (ref 12–45)
BASOPHILS # BLD AUTO: 0.9 % (ref 0–1.8)
BASOPHILS # BLD: 0.04 K/UL (ref 0–0.12)
BILIRUB SERPL-MCNC: 0.3 MG/DL (ref 0.1–1.5)
BUN SERPL-MCNC: 24 MG/DL (ref 8–22)
CALCIUM ALBUM COR SERPL-MCNC: 8.9 MG/DL (ref 8.5–10.5)
CALCIUM SERPL-MCNC: 9 MG/DL (ref 8.5–10.5)
CHLORIDE SERPL-SCNC: 107 MMOL/L (ref 96–112)
CHOLEST SERPL-MCNC: 208 MG/DL (ref 100–199)
CO2 SERPL-SCNC: 24 MMOL/L (ref 20–33)
CREAT SERPL-MCNC: 0.79 MG/DL (ref 0.5–1.4)
EOSINOPHIL # BLD AUTO: 0.1 K/UL (ref 0–0.51)
EOSINOPHIL NFR BLD: 2.3 % (ref 0–6.9)
ERYTHROCYTE [DISTWIDTH] IN BLOOD BY AUTOMATED COUNT: 44 FL (ref 35.9–50)
EST. AVERAGE GLUCOSE BLD GHB EST-MCNC: 100 MG/DL
GFR SERPLBLD CREATININE-BSD FMLA CKD-EPI: 90 ML/MIN/1.73 M 2
GLOBULIN SER CALC-MCNC: 2.7 G/DL (ref 1.9–3.5)
GLUCOSE SERPL-MCNC: 95 MG/DL (ref 65–99)
HBA1C MFR BLD: 5.1 % (ref 4–5.6)
HCT VFR BLD AUTO: 40.4 % (ref 37–47)
HDLC SERPL-MCNC: 46 MG/DL
HGB BLD-MCNC: 13.5 G/DL (ref 12–16)
IMM GRANULOCYTES # BLD AUTO: 0.01 K/UL (ref 0–0.11)
IMM GRANULOCYTES NFR BLD AUTO: 0.2 % (ref 0–0.9)
LDLC SERPL CALC-MCNC: 141 MG/DL
LYMPHOCYTES # BLD AUTO: 1.64 K/UL (ref 1–4.8)
LYMPHOCYTES NFR BLD: 38.1 % (ref 22–41)
MCH RBC QN AUTO: 32.5 PG (ref 27–33)
MCHC RBC AUTO-ENTMCNC: 33.4 G/DL (ref 32.2–35.5)
MCV RBC AUTO: 97.3 FL (ref 81.4–97.8)
MONOCYTES # BLD AUTO: 0.43 K/UL (ref 0–0.85)
MONOCYTES NFR BLD AUTO: 10 % (ref 0–13.4)
NEUTROPHILS # BLD AUTO: 2.09 K/UL (ref 1.82–7.42)
NEUTROPHILS NFR BLD: 48.5 % (ref 44–72)
NRBC # BLD AUTO: 0 K/UL
NRBC BLD-RTO: 0 /100 WBC (ref 0–0.2)
PLATELET # BLD AUTO: 207 K/UL (ref 164–446)
PMV BLD AUTO: 10.8 FL (ref 9–12.9)
POTASSIUM SERPL-SCNC: 4.8 MMOL/L (ref 3.6–5.5)
PROT SERPL-MCNC: 6.8 G/DL (ref 6–8.2)
RBC # BLD AUTO: 4.15 M/UL (ref 4.2–5.4)
SODIUM SERPL-SCNC: 143 MMOL/L (ref 135–145)
T3FREE SERPL-MCNC: 2.86 PG/ML (ref 2–4.4)
T4 FREE SERPL-MCNC: 1.24 NG/DL (ref 0.93–1.7)
TRIGL SERPL-MCNC: 107 MG/DL (ref 0–149)
TSH SERPL DL<=0.005 MIU/L-ACNC: 0.37 UIU/ML (ref 0.38–5.33)
WBC # BLD AUTO: 4.3 K/UL (ref 4.8–10.8)

## 2023-06-02 PROCEDURE — 82306 VITAMIN D 25 HYDROXY: CPT

## 2023-06-02 PROCEDURE — 85025 COMPLETE CBC W/AUTO DIFF WBC: CPT

## 2023-06-02 PROCEDURE — 99211 OFF/OP EST MAY X REQ PHY/QHP: CPT

## 2023-06-02 PROCEDURE — 83036 HEMOGLOBIN GLYCOSYLATED A1C: CPT

## 2023-06-02 PROCEDURE — 84439 ASSAY OF FREE THYROXINE: CPT

## 2023-06-02 PROCEDURE — 84443 ASSAY THYROID STIM HORMONE: CPT

## 2023-06-02 PROCEDURE — 36415 COLL VENOUS BLD VENIPUNCTURE: CPT

## 2023-06-02 PROCEDURE — 80061 LIPID PANEL: CPT

## 2023-06-02 PROCEDURE — 84481 FREE ASSAY (FT-3): CPT

## 2023-06-02 PROCEDURE — 80053 COMPREHEN METABOLIC PANEL: CPT

## 2023-06-02 NOTE — PATIENT INSTRUCTIONS
-Keep your wound dressing clean, dry, and intact.    -Change your dressing daily or if it becomes soiled, soaked, or falls off.    -Should you experience any significant changes in your wound(s), such as infection (redness, swelling, localized heat, increased pain, fever > 101 F, chills) or have any questions regarding your home care instructions, please contact the wound center at [phone]. If after hours, contact your primary care physician or go to the hospital emergency room.

## 2023-06-07 ENCOUNTER — TELEMEDICINE (OUTPATIENT)
Dept: MEDICAL GROUP | Facility: MEDICAL CENTER | Age: 53
End: 2023-06-07
Payer: COMMERCIAL

## 2023-06-07 VITALS — BODY MASS INDEX: 33.27 KG/M2 | WEIGHT: 207 LBS | HEIGHT: 66 IN

## 2023-06-07 DIAGNOSIS — E03.4 HYPOTHYROIDISM DUE TO ACQUIRED ATROPHY OF THYROID: ICD-10-CM

## 2023-06-07 DIAGNOSIS — K59.00 CONSTIPATION, UNSPECIFIED CONSTIPATION TYPE: ICD-10-CM

## 2023-06-07 DIAGNOSIS — F41.9 ANXIETY AND DEPRESSION: ICD-10-CM

## 2023-06-07 DIAGNOSIS — F32.A ANXIETY AND DEPRESSION: ICD-10-CM

## 2023-06-07 DIAGNOSIS — E55.9 VITAMIN D DEFICIENCY: ICD-10-CM

## 2023-06-07 DIAGNOSIS — E78.2 MIXED HYPERLIPIDEMIA: ICD-10-CM

## 2023-06-07 PROCEDURE — 99214 OFFICE O/P EST MOD 30 MIN: CPT | Mod: 95 | Performed by: NURSE PRACTITIONER

## 2023-06-07 RX ORDER — ARIPIPRAZOLE 5 MG/1
5 TABLET ORAL DAILY
Qty: 60 TABLET | Refills: 0 | Status: SHIPPED | OUTPATIENT
Start: 2023-06-07 | End: 2023-06-28 | Stop reason: SDUPTHER

## 2023-06-07 RX ORDER — DOCUSATE SODIUM 100 MG/1
100 CAPSULE, LIQUID FILLED ORAL DAILY
Qty: 90 CAPSULE | Refills: 1 | Status: SHIPPED | OUTPATIENT
Start: 2023-06-07 | End: 2023-11-01

## 2023-06-07 RX ORDER — LEVOTHYROXINE SODIUM 88 UG/1
88 TABLET ORAL
Qty: 90 TABLET | Refills: 1 | Status: SHIPPED | OUTPATIENT
Start: 2023-06-07 | End: 2023-11-01

## 2023-06-07 RX ORDER — ERGOCALCIFEROL 1.25 MG/1
50000 CAPSULE ORAL
Qty: 12 CAPSULE | Refills: 3 | Status: SHIPPED | OUTPATIENT
Start: 2023-06-07

## 2023-06-07 ASSESSMENT — FIBROSIS 4 INDEX: FIB4 SCORE: 1.28

## 2023-06-07 NOTE — PROGRESS NOTES
Virtual Visit: Established Patient   This visit was conducted via Zoom using secure and encrypted videoconferencing technology.   The patient was in their home in the [facility].    The patient's identity was confirmed and verbal consent was obtained for this virtual visit.     Subjective:   CC:   Chief Complaint   Patient presents with    Lab Results    Follow-Up       Leigh Whitley is a 52 y.o. female presenting for evaluation and management of:    Mixed hyperlipidemia   Latest Reference Range & Units 06/02/23 09:38   Cholesterol,Tot 100 - 199 mg/dL 208 (H)   Triglycerides 0 - 149 mg/dL 107   HDL >=40 mg/dL 46   LDL <100 mg/dL 141 (H)   (H): Data is abnormally high  The 10-year ASCVD risk score (Frantz GARCIA, et al., 2019) is: 1.4%    Values used to calculate the score:      Age: 52 years      Sex: Female      Is Non- : No      Diabetic: No      Tobacco smoker: No      Systolic Blood Pressure: 114 mmHg      Is BP treated: No      HDL Cholesterol: 46 mg/dL      Total Cholesterol: 208 mg/dL   Non smoker.  Not interested in Cholesterol medicine.   Taking supplements for cholesterol.     Vitamin D deficiency   Latest Reference Range & Units 06/02/23 09:38   25-Hydroxy   Vitamin D 25 30 - 100 ng/mL 57       Hypothyroidism   Latest Reference Range & Units 06/02/23 09:38   TSH 0.380 - 5.330 uIU/mL 0.370 (L)   Free T-4 0.93 - 1.70 ng/dL 1.24   T3,Free 2.00 - 4.40 pg/mL 2.86   (L): Data is abnormally low  Pt admits to constipation.  Currently on levothyroxine 88 mcg.    Anxiety and depression  This is a chronic problem.  Patient admits to chronic depression.  Her symptoms of anxiety are under control.  She is on Celexa 20 mg.    Constipation  New problem.  This is a chronic problem for patient.  Constipation since she was a child.  She is well-hydrated and healthy diet with adequate daily fiber intake.  History of hypothyroid, well controlled.     ROS     Current medicines (including changes  today)  Current Outpatient Medications   Medication Sig Dispense Refill    vitamin D2, Ergocalciferol, (DRISDOL) 1.25 MG (24763 UT) Cap capsule Take 1 Capsule by mouth every 7 days. 12 Capsule 3    ARIPiprazole (ABILIFY) 5 MG tablet Take 1 Tablet by mouth every day. 60 Tablet 0    docusate sodium (COLACE) 100 MG Cap Take 1 Capsule by mouth every day at 6 PM. 90 Capsule 1    magnesium hydroxide (MILK OF MAGNESIA) 400 MG/5ML Suspension Take 5 mL by mouth 1 time a day as needed (constipation). 43 mL 1    levothyroxine (SYNTHROID) 88 MCG Tab Take 1 Tablet by mouth every morning on an empty stomach. 90 Tablet 1    Non Formulary Request Take 1 Capsule by mouth every day. Lions Jong Mushroom Capsules      therapeutic multivitamin-minerals (THERAGRAN-M) Tab Take 1 Tablet by mouth every day.      COLLAGEN PO Take 1 Capsule by mouth every day.      brimonidine (ALPHAGAN) 0.2 % Solution Administer 1 Drop into the right eye 2 times a day.       No current facility-administered medications for this visit.       Patient Active Problem List    Diagnosis Date Noted    Constipation 06/07/2023    Bone cyst of left ankle 05/31/2023    S/P bariatric surgery 04/27/2023    Weakness 04/27/2023    Obstructive sleep apnea syndrome 11/09/2022    Snoring 09/09/2022    Vitamin D deficiency 09/09/2022    Abnormal sensation of eye 08/31/2022    Onychocryptosis 05/27/2022    Mixed hyperlipidemia 07/01/2021    History of stroke 05/14/2021    Panic attacks 05/14/2021    Severe episode of recurrent major depressive disorder, without psychotic features ([facility]) 01/29/2020    ADD (attention deficit disorder) without hyperactivity 02/09/2018    Status post bariatric surgery 02/09/2018    GERD (gastroesophageal reflux disease) 02/09/2018    Environmental allergies 08/30/2017    Obesity (BMI 30-39.9) 08/30/2017    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  Skin: No rashes in visible areas.  Eye: Round. Conjunctiva clear, lids normal. No icterus.   ENMT: Lips pink without lesions, good dentition, moist mucous membranes. Phonation normal.  Neck: No masses, no thyromegaly. Moves freely without pain.  Respiratory: Unlabored respiratory effort, no cough or audible wheeze  Psych: Alert and oriented x3, normal affect and mood.     Assessment and Plan:   The following treatment plan was discussed:   1. Mixed hyperlipidemia  Uncontrolled, stable. Discussed etiology and potential risk factors. Discussed 10-year ASCVD risk. Educated on healthy lifestyle including nutrition and exercise.  Avoid excessive red meat and simple carbohydrates.  Increase fiber intake to 25-30 g daily if tolerated and take fish oil 2 g nightly.  Advised low saturated fat, low carbohydrate diet.  Quit smoking if you do. Maintain adequate hydration. Advise daily exercise 45-60 mins per tolerance and preference.     2. Vitamin D deficiency  Improving, continue rx  - vitamin D2, Ergocalciferol, (DRISDOL) 1.25 MG (71948 UT) Cap capsule; Take 1 Capsule by mouth every 7 days.  Dispense: 12 Capsule; Refill: 3    3. Hypothyroidism due to acquired atrophy of thyroid  Stable on current regimen.  Continue.  Refills given   - levothyroxine (SYNTHROID) 88 MCG Tab; Take 1 Tablet by mouth every morning on an empty stomach.  Dispense: 90 Tablet; Refill: 1    4. Constipation, unspecified constipation type  Uncontrolled.  Continue adequate hydration and fiber intake.  Trial of Colace and milk of magnesia.  - docusate sodium (COLACE) 100 MG Cap; Take 1 Capsule by mouth every day at 6 PM.  Dispense: 90 Capsule; Refill: 1  - magnesium hydroxide (MILK OF MAGNESIA) 400 MG/5ML Suspension; Take 5 mL by mouth 1 time a day as needed " (constipation).  Dispense: 43 mL; Refill: 1    5. Anxiety and depression  Uncontrolled, stable.  Trial of Abilify.  We will follow-up in 2 weeks.  - ARIPiprazole (ABILIFY) 5 MG tablet; Take 1 Tablet by mouth every day.  Dispense: 60 Tablet; Refill: 0       Discussed with patient possible alternative diagnoses, patient is to take all medications as prescribed.      If symptoms persist FU w/PCP, if symptoms worsen go to emergency room.      If experiencing any side effects from prescribed medications report to the office immediately or go to emergency room.     Reviewed indication, dosage, usage and potential adverse effects of prescribed medications.      Reviewed risks and benefits of treatment plan. Patient verbalizes understanding of all instruction and verbally agrees to plan.     Discussed plan with the patient, and patient agrees to the above.      I personally reviewed prior external notes and test results pertinent to today's visit.     Follow-up: 3 wks

## 2023-06-07 NOTE — ASSESSMENT & PLAN NOTE
New problem.  This is a chronic problem for patient.  Constipation since she was a child.  She is well-hydrated and healthy diet with adequate daily fiber intake.  History of hypothyroid, well controlled.

## 2023-06-07 NOTE — ASSESSMENT & PLAN NOTE
This is a chronic problem.  Patient admits to chronic depression.  Her symptoms of anxiety are under control.  She is on Celexa 20 mg.

## 2023-06-07 NOTE — ASSESSMENT & PLAN NOTE
Latest Reference Range & Units 06/02/23 09:38   Cholesterol,Tot 100 - 199 mg/dL 208 (H)   Triglycerides 0 - 149 mg/dL 107   HDL >=40 mg/dL 46   LDL <100 mg/dL 141 (H)   (H): Data is abnormally high  The 10-year ASCVD risk score (Frantz GARCIA, et al., 2019) is: 1.4%    Values used to calculate the score:      Age: 52 years      Sex: Female      Is Non- : No      Diabetic: No      Tobacco smoker: No      Systolic Blood Pressure: 114 mmHg      Is BP treated: No      HDL Cholesterol: 46 mg/dL      Total Cholesterol: 208 mg/dL   Non smoker.  Not interested in Cholesterol medicine.   Taking supplements for cholesterol.

## 2023-06-07 NOTE — ASSESSMENT & PLAN NOTE
Latest Reference Range & Units 06/02/23 09:38   TSH 0.380 - 5.330 uIU/mL 0.370 (L)   Free T-4 0.93 - 1.70 ng/dL 1.24   T3,Free 2.00 - 4.40 pg/mL 2.86   (L): Data is abnormally low  Pt admits to constipation.  Currently on levothyroxine 88 mcg.

## 2023-06-09 ENCOUNTER — HOSPITAL ENCOUNTER (OUTPATIENT)
Dept: RADIOLOGY | Facility: MEDICAL CENTER | Age: 53
End: 2023-06-09

## 2023-06-09 ENCOUNTER — NON-PROVIDER VISIT (OUTPATIENT)
Dept: WOUND CARE | Facility: MEDICAL CENTER | Age: 53
End: 2023-06-09
Attending: STUDENT IN AN ORGANIZED HEALTH CARE EDUCATION/TRAINING PROGRAM
Payer: COMMERCIAL

## 2023-06-09 PROCEDURE — 99211 OFF/OP EST MAY X REQ PHY/QHP: CPT

## 2023-06-09 NOTE — PATIENT INSTRUCTIONS
-Keep your wound dressing clean, dry, and intact.    -Change your dressing daily and if it becomes soiled, soaked, or falls off.    -Should you experience any significant changes in your wound(s), such as infection (redness, swelling, localized heat, increased pain, fever > 101 F, chills) or have any questions regarding your home care instructions, please contact the wound center at [phone]. If after hours, contact your primary care physician or go to the hospital emergency room.      Detail Level: Zone

## 2023-06-13 ENCOUNTER — APPOINTMENT (OUTPATIENT)
Dept: RADIOLOGY | Facility: MEDICAL CENTER | Age: 53
End: 2023-06-13
Attending: NURSE PRACTITIONER
Payer: COMMERCIAL

## 2023-06-16 ENCOUNTER — APPOINTMENT (OUTPATIENT)
Dept: RADIOLOGY | Facility: MEDICAL CENTER | Age: 53
End: 2023-06-16
Attending: NURSE PRACTITIONER
Payer: COMMERCIAL

## 2023-06-16 ENCOUNTER — NON-PROVIDER VISIT (OUTPATIENT)
Dept: WOUND CARE | Facility: MEDICAL CENTER | Age: 53
End: 2023-06-16
Attending: STUDENT IN AN ORGANIZED HEALTH CARE EDUCATION/TRAINING PROGRAM
Payer: COMMERCIAL

## 2023-06-16 PROCEDURE — 97602 WOUND(S) CARE NON-SELECTIVE: CPT

## 2023-06-16 NOTE — PATIENT INSTRUCTIONS
-Keep your wound dressing clean, dry, and intact.    -Change your dressing daily and if it becomes soiled, soaked, or falls off.    -Should you experience any significant changes in your wound(s), such as infection (redness, swelling, localized heat, increased pain, fever > 101 F, chills) or have any questions regarding your home care instructions, please contact the wound center at [phone]. If after hours, contact your primary care physician or go to the hospital emergency room.

## 2023-06-23 ENCOUNTER — APPOINTMENT (OUTPATIENT)
Dept: RADIOLOGY | Facility: MEDICAL CENTER | Age: 53
End: 2023-06-23
Attending: NURSE PRACTITIONER
Payer: COMMERCIAL

## 2023-06-23 ENCOUNTER — NON-PROVIDER VISIT (OUTPATIENT)
Dept: WOUND CARE | Facility: MEDICAL CENTER | Age: 53
End: 2023-06-23
Attending: STUDENT IN AN ORGANIZED HEALTH CARE EDUCATION/TRAINING PROGRAM
Payer: COMMERCIAL

## 2023-06-23 DIAGNOSIS — Z12.31 ENCOUNTER FOR SCREENING MAMMOGRAM FOR MALIGNANT NEOPLASM OF BREAST: ICD-10-CM

## 2023-06-23 PROCEDURE — 99211 OFF/OP EST MAY X REQ PHY/QHP: CPT

## 2023-06-23 PROCEDURE — 77063 BREAST TOMOSYNTHESIS BI: CPT

## 2023-06-23 NOTE — PATIENT INSTRUCTIONS
-Keep your wound dressing clean, dry, and intact.    -Change your dressing daily and if it becomes soiled, soaked, or falls off.    -Should you experience any significant changes in your wound(s), such as infection (redness, swelling, localized heat, increased pain, fever > 101 F, chills) or have any questions regarding your home care instructions, please contact the wound center at [phone]. If after hours, contact your primary care physician or go to the hospital emergency room.

## 2023-06-28 ENCOUNTER — TELEMEDICINE (OUTPATIENT)
Dept: MEDICAL GROUP | Facility: MEDICAL CENTER | Age: 53
End: 2023-06-28
Payer: COMMERCIAL

## 2023-06-28 DIAGNOSIS — F32.A ANXIETY AND DEPRESSION: ICD-10-CM

## 2023-06-28 DIAGNOSIS — K59.00 CONSTIPATION, UNSPECIFIED CONSTIPATION TYPE: ICD-10-CM

## 2023-06-28 DIAGNOSIS — F41.9 ANXIETY AND DEPRESSION: ICD-10-CM

## 2023-06-28 PROCEDURE — 99214 OFFICE O/P EST MOD 30 MIN: CPT | Mod: 95 | Performed by: NURSE PRACTITIONER

## 2023-06-28 RX ORDER — ARIPIPRAZOLE 5 MG/1
5 TABLET ORAL DAILY
Qty: 90 TABLET | Refills: 0 | Status: SHIPPED | OUTPATIENT
Start: 2023-06-28 | End: 2023-07-13

## 2023-06-28 NOTE — ASSESSMENT & PLAN NOTE
Chronic problem.  Last visit stopped Celexa and started Abilify 5 mg.  Has been on Abilify for 2 weeks and already feels significant improvement.  Her energy is back in the mornings, no brain fog.  Anxiety and depression is under control.  She would like to continue at the current dose.  We will follow-up in a month.

## 2023-06-28 NOTE — PROGRESS NOTES
Virtual Visit: Established Patient   This visit was conducted via Zoom using secure and encrypted videoconferencing technology.   The patient was in their home in the [facility].    The patient's identity was confirmed and verbal consent was obtained for this virtual visit.     Subjective:   CC: No chief complaint on file.      Leigh Whitley is a 52 y.o. female presenting for evaluation and management of:    Anxiety and depression  Chronic problem.  Last visit stopped Celexa and started Abilify 5 mg.  Has been on Abilify for 2 weeks and already feels significant improvement.  Her energy is back in the mornings, no brain fog.  Anxiety and depression is under control.  She would like to continue at the current dose.  We will follow-up in a month.    Constipation  Chronic intermittent problem.  Started taking milk of magnesia for constipation is working well.  Needs a refill today.     ROS     Current medicines (including changes today)  Current Outpatient Medications   Medication Sig Dispense Refill    ARIPiprazole (ABILIFY) 5 MG tablet Take 1 Tablet by mouth every day. 90 Tablet 0    magnesium hydroxide (MILK OF MAGNESIA) 400 MG/5ML Suspension Take 5 mL by mouth 1 time a day as needed (constipation). 43 mL 1    vitamin D2, Ergocalciferol, (DRISDOL) 1.25 MG (62743 UT) Cap capsule Take 1 Capsule by mouth every 7 days. 12 Capsule 3    docusate sodium (COLACE) 100 MG Cap Take 1 Capsule by mouth every day at 6 PM. 90 Capsule 1    levothyroxine (SYNTHROID) 88 MCG Tab Take 1 Tablet by mouth every morning on an empty stomach. 90 Tablet 1    Non Formulary Request Take 1 Capsule by mouth every day. Lions Jong Mushroom Capsules      therapeutic multivitamin-minerals (THERAGRAN-M) Tab Take 1 Tablet by mouth every day.      COLLAGEN PO Take 1 Capsule by mouth every day.      brimonidine (ALPHAGAN) 0.2 % Solution Administer 1 Drop into the right eye 2 times a day.       No current facility-administered medications for  this visit.       Patient Active Problem List    Diagnosis Date Noted    Constipation 06/07/2023    Bone cyst of left ankle 05/31/2023    S/P bariatric surgery 04/27/2023    Weakness 04/27/2023    Obstructive sleep apnea syndrome 11/09/2022    Snoring 09/09/2022    Vitamin D deficiency 09/09/2022    Abnormal sensation of eye 08/31/2022    Onychocryptosis 05/27/2022    Mixed hyperlipidemia 07/01/2021    History of stroke 05/14/2021    Panic attacks 05/14/2021    Severe episode of recurrent major depressive disorder, without psychotic features ([facility]) 01/29/2020    ADD (attention deficit disorder) without hyperactivity 02/09/2018    Status post bariatric surgery 02/09/2018    GERD (gastroesophageal reflux disease) 02/09/2018    Environmental allergies 08/30/2017    Obesity (BMI 30-39.9) 08/30/2017    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    Physical Exam:  Constitutional: Alert, no distress, well-groomed.  Skin: No rashes in visible areas.  Eye: Round. Conjunctiva clear, lids normal. No icterus.   ENMT: Lips pink without lesions, good dentition, moist mucous membranes. Phonation normal.  Neck: No masses, no thyromegaly. Moves freely without pain.  Respiratory: Unlabored respiratory effort, no cough or audible wheeze  Psych: Alert and oriented x3, normal affect and mood.     Assessment and Plan:   The following treatment plan was discussed:   1. Anxiety and depression  Improving, continue current medication and dose.  We will follow-up in a month  - ARIPiprazole (ABILIFY) 5 MG tablet; Take 1 Tablet by mouth every day.  Dispense: 90 Tablet; Refill: 0    2. Constipation, unspecified constipation type  Stable on current regimen.  Continue.  Refills given   - magnesium hydroxide (MILK OF MAGNESIA) 400 MG/5ML Suspension; Take 5 mL by mouth 1 time  a day as needed (constipation).  Dispense: 43 mL; Refill: 1       Discussed with patient possible alternative diagnoses, patient is to take all medications as prescribed.      If symptoms persist FU w/PCP, if symptoms worsen go to emergency room.      If experiencing any side effects from prescribed medications report to the office immediately or go to emergency room.     Reviewed indication, dosage, usage and potential adverse effects of prescribed medications.      Reviewed risks and benefits of treatment plan. Patient verbalizes understanding of all instruction and verbally agrees to plan.     Discussed plan with the patient, and patient agrees to the above.      I personally reviewed prior external notes and test results pertinent to today's visit.     Follow-up: 1 mo

## 2023-06-28 NOTE — ASSESSMENT & PLAN NOTE
Chronic intermittent problem.  Started taking milk of magnesia for constipation is working well.  Needs a refill today.

## 2023-06-30 ENCOUNTER — APPOINTMENT (OUTPATIENT)
Dept: WOUND CARE | Facility: MEDICAL CENTER | Age: 53
End: 2023-06-30
Attending: STUDENT IN AN ORGANIZED HEALTH CARE EDUCATION/TRAINING PROGRAM
Payer: COMMERCIAL

## 2023-07-03 ENCOUNTER — TELEPHONE (OUTPATIENT)
Dept: HEALTH INFORMATION MANAGEMENT | Facility: OTHER | Age: 53
End: 2023-07-03
Payer: COMMERCIAL

## 2023-07-07 ENCOUNTER — HOSPITAL ENCOUNTER (OUTPATIENT)
Facility: MEDICAL CENTER | Age: 53
End: 2023-07-07
Attending: STUDENT IN AN ORGANIZED HEALTH CARE EDUCATION/TRAINING PROGRAM
Payer: COMMERCIAL

## 2023-07-07 ENCOUNTER — NON-PROVIDER VISIT (OUTPATIENT)
Dept: WOUND CARE | Facility: MEDICAL CENTER | Age: 53
End: 2023-07-07
Attending: STUDENT IN AN ORGANIZED HEALTH CARE EDUCATION/TRAINING PROGRAM
Payer: COMMERCIAL

## 2023-07-07 DIAGNOSIS — T81.89XD NON-HEALING SURGICAL WOUND, SUBSEQUENT ENCOUNTER: ICD-10-CM

## 2023-07-07 PROCEDURE — 87070 CULTURE OTHR SPECIMN AEROBIC: CPT

## 2023-07-07 PROCEDURE — 99211 OFF/OP EST MAY X REQ PHY/QHP: CPT

## 2023-07-07 PROCEDURE — 87076 CULTURE ANAEROBE IDENT EACH: CPT

## 2023-07-07 PROCEDURE — 87077 CULTURE AEROBIC IDENTIFY: CPT

## 2023-07-07 PROCEDURE — 87205 SMEAR GRAM STAIN: CPT

## 2023-07-07 NOTE — PATIENT INSTRUCTIONS
-Keep your wound dressing clean, dry, and intact.    -Change your dressing daily and if it becomes soiled, soaked, or falls off.    -Should you experience any significant changes in your wound(s), such as infection (redness, swelling, localized heat, increased pain, fever > 101 F, chills) or have any questions regarding your home care instructions, please contact the wound center at [phone]. If after hours, contact your primary care physician or go to the hospital emergency room.

## 2023-07-07 NOTE — WOUND TEAM
LUQ abdomen wound cultured today due to:    -increased pain  -able to palpate indurated area  -small amt of redness  -wound probes deeper than last week

## 2023-07-08 LAB
GRAM STN SPEC: NORMAL
SIGNIFICANT IND 70042: NORMAL
SITE SITE: NORMAL
SOURCE SOURCE: NORMAL

## 2023-07-10 ENCOUNTER — TELEPHONE (OUTPATIENT)
Dept: WOUND CARE | Facility: MEDICAL CENTER | Age: 53
End: 2023-07-10
Payer: COMMERCIAL

## 2023-07-10 DIAGNOSIS — L08.9 WOUND INFECTION: ICD-10-CM

## 2023-07-10 DIAGNOSIS — T14.8XXA WOUND INFECTION: ICD-10-CM

## 2023-07-10 LAB
BACTERIA WND AEROBE CULT: ABNORMAL
BACTERIA WND AEROBE CULT: ABNORMAL
GRAM STN SPEC: ABNORMAL
SIGNIFICANT IND 70042: ABNORMAL
SITE SITE: ABNORMAL
SOURCE SOURCE: ABNORMAL

## 2023-07-10 RX ORDER — LINEZOLID 600 MG/1
600 TABLET, FILM COATED ORAL 2 TIMES DAILY
Qty: 14 TABLET | Refills: 0 | Status: SHIPPED | OUTPATIENT
Start: 2023-07-10 | End: 2023-07-17

## 2023-07-10 NOTE — TELEPHONE ENCOUNTER
Telephone call from Leigh regarding positive culture. Dr. Vaughan called antibiotic to her pharmacy of choice, [facility], [place] with instructions to hold brimonidine eye drop while on antibiotic.

## 2023-07-10 NOTE — TELEPHONE ENCOUNTER
[facility] pharmacy called regarding drug interaction with Phentermine that is not on the current medication list that we have for Leigh. Pharmacist to advise to not take while on antibiotic and this writer also called and left message to not take the phentermine while on antibiotic course of Linezolid as advised by pharmacist.

## 2023-07-10 NOTE — TELEPHONE ENCOUNTER
Brief Wound Care Provider Note    Patient with evidence of wound infection in clinic. Wound culture positive for corynebacterium striatum. Will prescribe short course of Linezolid.    Clinic staff to call patient.

## 2023-07-12 DIAGNOSIS — K59.00 CONSTIPATION, UNSPECIFIED CONSTIPATION TYPE: ICD-10-CM

## 2023-07-12 RX ORDER — MAGNESIUM HYDROXIDE 1200 MG/15ML
SUSPENSION ORAL
Qty: 43 ML | Refills: 0 | Status: SHIPPED | OUTPATIENT
Start: 2023-07-12 | End: 2023-08-23

## 2023-07-13 ENCOUNTER — OFFICE VISIT (OUTPATIENT)
Dept: MEDICAL GROUP | Facility: MEDICAL CENTER | Age: 53
End: 2023-07-13
Payer: COMMERCIAL

## 2023-07-13 VITALS
SYSTOLIC BLOOD PRESSURE: 126 MMHG | WEIGHT: 210.65 LBS | DIASTOLIC BLOOD PRESSURE: 72 MMHG | BODY MASS INDEX: 33.85 KG/M2 | OXYGEN SATURATION: 98 % | RESPIRATION RATE: 16 BRPM | TEMPERATURE: 97.1 F | HEART RATE: 83 BPM | HEIGHT: 66 IN

## 2023-07-13 DIAGNOSIS — E03.4 HYPOTHYROIDISM DUE TO ACQUIRED ATROPHY OF THYROID: ICD-10-CM

## 2023-07-13 DIAGNOSIS — F41.9 ANXIETY AND DEPRESSION: ICD-10-CM

## 2023-07-13 DIAGNOSIS — K59.00 CONSTIPATION, UNSPECIFIED CONSTIPATION TYPE: ICD-10-CM

## 2023-07-13 DIAGNOSIS — Z00.00 ANNUAL PHYSICAL EXAM: ICD-10-CM

## 2023-07-13 DIAGNOSIS — F32.A ANXIETY AND DEPRESSION: ICD-10-CM

## 2023-07-13 PROCEDURE — 99396 PREV VISIT EST AGE 40-64: CPT | Performed by: NURSE PRACTITIONER

## 2023-07-13 PROCEDURE — 3078F DIAST BP <80 MM HG: CPT | Performed by: NURSE PRACTITIONER

## 2023-07-13 PROCEDURE — 3074F SYST BP LT 130 MM HG: CPT | Performed by: NURSE PRACTITIONER

## 2023-07-13 PROCEDURE — 99213 OFFICE O/P EST LOW 20 MIN: CPT | Mod: 25 | Performed by: NURSE PRACTITIONER

## 2023-07-13 RX ORDER — ESCITALOPRAM OXALATE 10 MG/1
10 TABLET ORAL DAILY
Qty: 30 TABLET | Refills: 1 | Status: SHIPPED | OUTPATIENT
Start: 2023-07-13 | End: 2023-08-23

## 2023-07-13 ASSESSMENT — FIBROSIS 4 INDEX: FIB4 SCORE: 1.28

## 2023-07-13 NOTE — ASSESSMENT & PLAN NOTE
Latest Reference Range & Units 06/02/23 09:38   TSH 0.380 - 5.330 uIU/mL 0.370 (L)   Free T-4 0.93 - 1.70 ng/dL 1.24   T3,Free 2.00 - 4.40 pg/mL 2.86   (L): Data is abnormally low  Chronic problem.  On levothyroxine 88 mcg.  Denies hypo or hyperthyroid symptoms.

## 2023-07-13 NOTE — PROGRESS NOTES
Chief Complaint   Patient presents with    Follow-Up       HISTORY OF PRESENT ILLNESS: Patient is a 52 y.o. female, established patient who presents today to discuss medical problems as listed below:    Health Maintenance:  COMPLETED       Allergies: Ultram [tramadol hcl]    Current Outpatient Medications   Medication Sig Dispense Refill    escitalopram (LEXAPRO) 10 MG Tab Take 1 Tablet by mouth every day. 30 Tablet 1    MILK OF MAGNESIA 400 MG/5ML Suspension Take 5 ml by mouth once daily as needed for constipation. 43 mL 0    linezolid (ZYVOX) 600 MG Tab Take 1 Tablet by mouth 2 times a day for 7 days. 14 Tablet 0    vitamin D2, Ergocalciferol, (DRISDOL) 1.25 MG (48328 UT) Cap capsule Take 1 Capsule by mouth every 7 days. 12 Capsule 3    docusate sodium (COLACE) 100 MG Cap Take 1 Capsule by mouth every day at 6 PM. 90 Capsule 1    levothyroxine (SYNTHROID) 88 MCG Tab Take 1 Tablet by mouth every morning on an empty stomach. 90 Tablet 1    Non Formulary Request Take 1 Capsule by mouth every day. [employer] Mushroom Capsules      therapeutic multivitamin-minerals (THERAGRAN-M) Tab Take 1 Tablet by mouth every day.      COLLAGEN PO Take 1 Capsule by mouth every day.      brimonidine (ALPHAGAN) 0.2 % Solution Administer 1 Drop into the right eye 2 times a day.       No current facility-administered medications for this visit.       Allergies, past medical history, past surgical history, family history, social history reviewed and updated.    Review of Systems:     - Constitutional: Negative for fever, chills, unexpected weight change, and fatigue/generalized weakness.     - HEENT: Negative for headaches, vision changes, hearing changes, ear pain, ear discharge, rhinorrhea, sinus congestion, sore throat, and neck pain.      - Respiratory: Negative for cough, sputum production, chest congestion, dyspnea, wheezing, and crackles.      - Cardiovascular: Negative for chest pain, palpitations, orthopnea, and bilateral lower  "extremity edema.     - Gastrointestinal: Negative for heartburn, nausea, vomiting, abdominal pain, hematochezia, melena, diarrhea, constipation, and greasy/foul-smelling stools.     - Genitourinary: Negative for dysuria, polyuria, hematuria, pyuria, urinary urgency, and urinary incontinence.    - Musculoskeletal: Negative for myalgias, back pain, and joint pain.     - Skin: Negative for rash, itching, cyanotic skin color change.     - Neurological: Negative for dizziness, tingling, tremors, focal sensory deficit, focal weakness and headaches.     - Endo/Heme/Allergies: Does not bruise/bleed easily.     - Psychiatric/Behavioral: Negative for depression, suicidal/homicidal ideation and memory loss.      All other systems reviewed and are negative    Exam:    /72   Pulse 83   Temp 36.2 °C (97.1 °F) (Temporal)   Resp 16   Ht 1.676 m (5' 6\")   Wt 95.5 kg (210 lb 10.4 oz)   SpO2 98%   BMI 34.00 kg/m²  Body mass index is 34 kg/m².    Physical Exam:  Constitutional: Well-developed and well-nourished. Not diaphoretic. No distress.   Skin: Skin is warm and dry. No rash noted.  Head: Atraumatic without lesions.  Eyes: Conjunctivae and extraocular motions are normal. Pupils are equal, round, and reactive to light. No scleral icterus.   Ears:  External ears unremarkable. Tympanic membranes clear and intact.  Nose: Nares patent. Septum midline. Turbinates without erythema nor edema. No discharge.   Mouth/Throat: Dentition is normal. Tongue normal. Oropharynx is clear and moist. Posterior pharynx without erythema or exudates.  Neck: Supple, trachea midline. Normal range of motion. No thyromegaly present. No lymphadenopathy--cervical or supraclavicular.  Cardiovascular: Regular rate and rhythm, S1 and S2 without murmur, rubs, or gallops.    Chest: Effort normal. Clear to auscultation throughout. No adventitious sounds. No CVA tenderness.  Abdomen: Soft, non tender, and without distention. Active bowel sounds in all " four quadrants. No rebound, guarding, masses or HSM.  : Negative for dysuria, polyuria, hematuria, pyuria, urinary urgency, and urinary incontinence.  Extremities: No cyanosis, clubbing, erythema, nor edema. Distal pulses intact and symmetric.   Musculoskeletal: All major joints AROM full in all directions without pain.  Neurological: Alert and oriented x 3. DTRs 2+/3 and symmetric. No cranial nerve deficit. 5/5 myotomes. Sensation intact. Negative Rhomberg.  Psychiatric:  Behavior, mood, and affect are appropriate.  MA/nursing note and vitals reviewed.    LABS: 2023  results reviewed and discussed with the patient, questions answered.    Assessment/Plan:  Anxiety and depression  Chronic problem. Has tried Abilify, did not work for her. Previously on Celexa and would like to restart. Currently being treated for a would infection and not advised to take any antidepressant. Would like to try Lexapro.    Constipation  Chronic and stable.  Utilizing milk of magnesia as needed.    Hypothyroidism   Latest Reference Range & Units 06/02/23 09:38   TSH 0.380 - 5.330 uIU/mL 0.370 (L)   Free T-4 0.93 - 1.70 ng/dL 1.24   T3,Free 2.00 - 4.40 pg/mL 2.86   (L): Data is abnormally low  Chronic problem.  On levothyroxine 88 mcg.  Denies hypo or hyperthyroid symptoms.    1. Anxiety and depression  Uncontrolled, stable.  Trial of Lexapro.  We will follow-up in 3 weeks.  - escitalopram (LEXAPRO) 10 MG Tab; Take 1 Tablet by mouth every day.  Dispense: 30 Tablet; Refill: 1    2. Annual physical exam    3. Constipation, unspecified constipation type  Stable on current regimen.  Continue.  Refills given     4. Hypothyroidism due to acquired atrophy of thyroid  Stable on current regimen.  Continue.  Refills given     Ob-Gyn/ History:    Last Pap Smear:  pt can not remember. no history of abnormal pap smears.    Health Maintenance  Cholesterol Screening: labs  Diabetes Screening: labs  Aspirin Use: no    Diet: regular   Exercise: daily    Substance Abuse: no   Safe in relationship.   Seat belts, bike helmet, gun safety discussed.  Sun protection used.    Cancer screening  Colorectal Cancer Screening: 3 yrs ago    Lung Cancer Screening: no increased risks    Cervical Cancer Screening: will schedule for pap smear    Breast Cancer Screenin23    Infectious disease screening/Immunizations  --STI Screening: no concerns   --Practices safe sex.  --HIV Screening: no concerns    --Hepatitis C Screening: no concerns   --Immunizations:      History: Past illnesses, surgeries, medications, allergies, family and social histories, status of chronic conditions  Exam: Blood pressure, height, weight, BMI, hearing screening, depression screening, eyes, ENT, cardiovascular, respiratory, GI, , musculoskeletal, skin, neurological, psychological, hematological  Counseling/Anticipatory Guidance: Nutrition, physical activity, healthy weight and diet, injury prevention (wear a helmet when riding a bicycle, motocycle, skiing, snowboarding or any other sport where head protecting is advised), skin cancer prevention (use of broad spectrum SPF 30 or higher, stay in the shade, wear sunglasses, wear a hat with wide brim, wear protective clothing covering extremities), misuse of tobacco, alcohol, and drugs, sexual behavior, dental health, mental health, fall prevention immunizations, recommended screenings for age/gender  Screening Services: Cholesterol, diabetes, colorectal cancer age 45 + per USPSTF  For Women: Breast cancer, cervical cancer, osteoporosis beginning at 65  Discussed with patient possible alternative diagnoses, patient is to take all medications as prescribed.      If symptoms persist FU w/PCP, if symptoms worsen go to emergency room.      If experiencing any side effects from prescribed medications report to the office immediately or go to emergency room.     Reviewed indication, dosage, usage and potential adverse effects of prescribed medications.       Reviewed risks and benefits of treatment plan. Patient verbalizes understanding of all instruction and verbally agrees to plan.     Discussed plan with the patient, and patient agrees to the above.      I personally reviewed prior external notes and test results pertinent to today's visit.      No follow-ups on file. annual

## 2023-07-13 NOTE — ASSESSMENT & PLAN NOTE
Chronic problem. Has tried Abilify, did not work for her. Previously on Celexa and would like to restart. Currently being treated for a would infection and not advised to take any antidepressant. Would like to try Lexapro.

## 2023-07-18 ENCOUNTER — TELEPHONE (OUTPATIENT)
Dept: WOUND CARE | Facility: MEDICAL CENTER | Age: 53
End: 2023-07-18
Payer: COMMERCIAL

## 2023-07-18 DIAGNOSIS — B37.0 ORAL THRUSH: ICD-10-CM

## 2023-07-18 NOTE — TELEPHONE ENCOUNTER
Telephone call from Leigh that she has thrush in mouth after taking antibiotic course. Dr Vaughan is prescribing for her to  an oral treatment.

## 2023-07-18 NOTE — TELEPHONE ENCOUNTER
Brief Wound Care Provider Note    Patient called to report that she has oral thrush from Abx therapy. Will prescribe Nystatin swish and spit. Clinic staff called to inform her and prescription will be sent to [facility] [address]

## 2023-07-21 ENCOUNTER — NON-PROVIDER VISIT (OUTPATIENT)
Dept: WOUND CARE | Facility: MEDICAL CENTER | Age: 53
End: 2023-07-21
Attending: STUDENT IN AN ORGANIZED HEALTH CARE EDUCATION/TRAINING PROGRAM
Payer: COMMERCIAL

## 2023-07-21 PROCEDURE — 99211 OFF/OP EST MAY X REQ PHY/QHP: CPT

## 2023-07-21 NOTE — PATIENT INSTRUCTIONS
-Keep your wound dressing clean, dry, and intact.    -Change your dressing daily and if it becomes soiled, soaked, or falls off.    -Should you experience any significant changes in your wound(s), such as infection (redness, swelling, localized heat, increased pain, fever > 101 F, chills) or have any questions regarding your home care instructions, please contact the wound center at [phone]. If after hours, contact your primary care physician or go to the hospital emergency room.

## 2023-07-25 ENCOUNTER — APPOINTMENT (OUTPATIENT)
Dept: MEDICAL GROUP | Facility: MEDICAL CENTER | Age: 53
End: 2023-07-25
Payer: COMMERCIAL

## 2023-07-28 ENCOUNTER — OFFICE VISIT (OUTPATIENT)
Dept: WOUND CARE | Facility: MEDICAL CENTER | Age: 53
End: 2023-07-28
Attending: STUDENT IN AN ORGANIZED HEALTH CARE EDUCATION/TRAINING PROGRAM
Payer: COMMERCIAL

## 2023-07-28 VITALS
RESPIRATION RATE: 18 BRPM | HEART RATE: 68 BPM | DIASTOLIC BLOOD PRESSURE: 50 MMHG | SYSTOLIC BLOOD PRESSURE: 89 MMHG | OXYGEN SATURATION: 99 % | TEMPERATURE: 97.6 F

## 2023-07-28 DIAGNOSIS — T81.89XD NON-HEALING SURGICAL WOUND, SUBSEQUENT ENCOUNTER: ICD-10-CM

## 2023-07-28 DIAGNOSIS — Z98.84 HISTORY OF REMOVAL OF LAPAROSCOPIC GASTRIC BANDING DEVICE: ICD-10-CM

## 2023-07-28 PROCEDURE — 3074F SYST BP LT 130 MM HG: CPT | Performed by: STUDENT IN AN ORGANIZED HEALTH CARE EDUCATION/TRAINING PROGRAM

## 2023-07-28 PROCEDURE — 99213 OFFICE O/P EST LOW 20 MIN: CPT | Performed by: STUDENT IN AN ORGANIZED HEALTH CARE EDUCATION/TRAINING PROGRAM

## 2023-07-28 PROCEDURE — 99213 OFFICE O/P EST LOW 20 MIN: CPT

## 2023-07-28 PROCEDURE — 3078F DIAST BP <80 MM HG: CPT | Performed by: STUDENT IN AN ORGANIZED HEALTH CARE EDUCATION/TRAINING PROGRAM

## 2023-07-28 NOTE — PROGRESS NOTES
Provider Encounter- Full Thickness wound    HISTORY OF PRESENT ILLNESS  Wound History:    START OF CARE IN CLINIC: 4/21/2023    REFERRING PROVIDER: Jyoti Kothari      WOUND- Full Thickness Wound   LOCATION: Left Upper Quadrant Full thickness wound   HISTORY:  52F with PMHx of Anxiety, depression, Hx lyme disease, Hx hypothyroidism, obesity. Patient had Lap Band removal with Dr. Gonzalez 2/22/2023 due to complications and not tolerating. According to patient her port site was left open to heal by secondary intention and was treated with wound VAC in Dr. Gonzalez's clinic. She was unhappy with lack of progress and felt that she was not receiving adequate care. Patient was referred to [facility].    Pertinent Medical History: Anxiety, Hx depression,  Hypothyroidism, Hx lyme disease, obesity    TOBACCO USE:  Denies use    Patient's problem list, allergies, and current medications reviewed and updated in Epic    Interval History:  4/21/2023: Clinic visit with Amari Vaughan MD. Patient reports that she is feeling ok, but is incredibly frusturated by lack of progress of wound and care she was receiving at surgeon's office. Patient had Lap Band removal due to complications and not tolerating on 2/22/2023 with Dr. Gonzalez. He left her port site open to heal by secondary intention. She was treated with wound VAC without resolution. Reports recently completed course of Bactrim due to concerns for infection. Wound has undermining with small tunnel which is not amenable to wound VAC today. Patient is currently working and not candidate for home health.    4/26/2023: Clinic visit with Amari Vaughan MD. Patient reports doing well. She went on walk recent and reported increased drainage following walk. She was questioning if she can do minor exercises, I recommend she walk and stay active, recommend against doing abdominal exercises or weight lifting currently. Patient brought wound VAC and supplies today and is prepared for excisional  "debridement of wound with injection of subcutaneous lidocaine to open wound to allow wound VAC to base of wound.    5/3/2023: Clinic visit with BRIDGETT John.  The depth of the wound has increased.  The wound has not progressed depth wise in three months.  Patient reports some generalized fatigue.  Occasional night sweats which may be associated with menopause.  Patient doesn't have increased pain at this time the drainage out of the wound bed is serosanguineous within the wound VAC canister.  There is no periwound erythema or edema.  However, patient does have a air pocket inferiorly to the wound with deep palpation you can hear the air released into the wound bed.  Therefore I will order a CT with contrast to determine any underlying pathology to the nonhealing wound.    5/10/2023 : Clinic visit with BRIDGETT Walker, MARY, EDWINN, ERENDIRA.  Patient was seen in the ED last week on 5/4 due to concerning findings on CT.  An attempt had been made to refer her to [facility] from the wound clinic, however they would not see her.  Patient is adamant that she does not want to follow-up with her original surgeon, Dr. Gonzalez.  Due to concerns about not being able to obtain follow-up she was advised to present to the emergency room.   CT  from 5/3 \" 5.0 x 2.9 cm open subcutaneous soft tissue defect defect containing gas and fluid in the left anterolateral abdominal. There is no definite evidence of fistulous connection with adjacent bowel.   It was recommended that if there were strong evidence of fistula, that a CT with rectal contrast to be done.   While in the ED, Dr. Kelsey with general surgery was consulted.  He did not feel patient needed to surgery, and based on wound assessment he did not feel an enteric fistula was likely.  Nonetheless, CT with rectal contrast was offered, patient declined.  Patient did not meet criteria for admission, and therefore was discharged home.  Another referral was placed to " "[facility].  Patient is emotional today, worried that although her wound appears to be healing, \"there is something brewing inside\".  She is very worried about previous CT findings, adamant that she does not want a colostomy.  She is demanding that another CT to be done.  She denies any fevers, but states that she is always fatigued, and often chilled.  She is eating a regular diet.  Reports that she had an unusual bowel movement this morning, of which she showed me a photo of on her phone.   After patient left clinic, I discussed case with our medical director, Dr. Dow.  He did not feel another CT was indicated at this point as she just a week ago.  However he did contact nurse manager at [facility] who stated she would speak with several of their surgeons to see if they would reconsider and see patient for second opinion.    5/25/2023 : Clinic visit with BRIDGETT Walker, PEEWEE-BC, NAYA, ERENDIRA.   She states she is feeling very well.  She is exercising again, and continues to take supplements to enhance wound healing.  She has been changing her dressing daily, reports quite a bit of drainage as she has become more active.  Depth has increased just slightly today.  There is no evidence of infection.    7/28/2023: Clinic visit with Amari Vaughan MD. Patient reports doing well. Wound opening is smaller, depth has improved. Drainage has decreased. Patient is pleased with current status of wound. Discussed referral to other surgeon as we have been unable to get [facility] into [facility], but she would rather wait and se if full wound healing. We discussed that underlying soft tissue deficit may not have healed, however with less drainage and wound continuing to improve this is reasonable. She has been unable to pack with strip packing. Will try small sliver of collagen to promote granulation of the pinpoint tract.    Review of Systems:  Unchanged from previous wound clinic assessment on 5/25/2023, " except as noted in interval history above       PHYSICAL EXAMINATION:   BP (!) 89/50   Pulse 68   Temp 36.4 °C (97.6 °F) (Temporal)   Resp 18   SpO2 99%     Physical Exam  Cardiovascular:      Rate and Rhythm: Normal rate.   Pulmonary:      Effort: Pulmonary effort is normal.   Abdominal:      Palpations: Abdomen is soft.   Skin:     Comments: Open wound left upper quadrant - Wound opening smaller, with epithelium forming. Tissue depth has decreased and minimal drainage. No evidence of infection.    Refer to wound flowsheet and photos     Neurological:      Mental Status: She is alert.         WOUND ASSESSMENT  Wound 02/22/23 Incision Abdomen LUQ abdomen (Active)   Wound Image   07/28/23 0930   Site Assessment Pink;Red;CAROLINE 07/28/23 0930   Periwound Assessment Blanchable erythema;Scar tissue 07/28/23 0930   Margins Attached edges 07/28/23 0930   Closure Secondary intention 05/05/23 0700   Drainage Amount Small 07/28/23 0930   Drainage Description Serous 07/28/23 0930   Treatments Cleansed;Site care 07/28/23 0930   Wound Cleansing Normal Saline Irrigation 07/28/23 0930   Periwound Protectant Skin Protectant Wipes to Periwound 07/28/23 0930   Dressing Status Clean;Dry;Intact 04/26/23 0900   Dressing Changed New 07/28/23 0930   Dressing Cleansing/Solutions Other (Comments) 07/28/23 0930   Dressing Options Collagen Dressing;Silicone Adhesive Foam 07/28/23 0930   Dressing Change/Treatment Frequency Daily, and As Needed 07/28/23 0930   Wound Team Following Bi-Monthly 07/28/23 0930   Non-staged Wound Description Full thickness 07/28/23 0930   Wound Length (cm) 0.3 cm 07/28/23 0930   Wound Width (cm) 0.1 cm 07/28/23 0930   Wound Depth (cm) 0.6 cm 07/28/23 0930   Wound Surface Area (cm^2) 0.03 cm^2 07/28/23 0930   Wound Volume (cm^3) 0.018 cm^3 07/28/23 0930   Post-Procedure Length (cm) 0.2 cm 07/07/23 0900   Post-Procedure Width (cm) 0.2 cm 07/07/23 0900   Post-Procedure Depth (cm) 3.8 cm 07/07/23 0900   Post-Procedure  Surface Area (cm^2) 0.04 cm^2 07/07/23 0900   Post-Procedure Volume (cm^3) 0.152 cm^3 07/07/23 0900   Wound Healing % 100 07/28/23 0930   Tunneling (cm) 0 cm 07/28/23 0930   Undermining (cm) 0 cm 07/28/23 0930   Wound Odor None 07/28/23 0930   Exposed Structures CAROLINE 07/28/23 0930   Number of days: 156       Procedure: Excisional debridement was not required today  -Wound probed with forceps  -Wound care by RN    Pertinent Labs and Diagnostics:    Labs:     A1c:   Lab Results   Component Value Date/Time    HBA1C 5.1 06/02/2023 09:38 AM          IMAGING: None    VASCULAR STUDIES: None    LAST  WOUND CULTURE:  DATE :   Lab Results   Component Value Date/Time    CULTRSULT Light growth usual skin robin. (A) 07/07/2023 08:45 AM    CULTRSULT Corynebacterium striatum group  Light growth   (A) 07/07/2023 08:45 AM         ASSESSMENT AND PLAN:     1. Non-healing surgical wound, subsequent encounter  2. History of removal of laparoscopic gastric banding device  Comments: Had removal of Lap Band by Dr. Gonzalez 2/2023, apparently left abdominal port site open to heal by secondary intention    7/28/2023: Wound measuring smaller and less depth. Significantly less drainage.  -No debridement required  - As wound is too small for packing, recommend small sliver of collagen.  - No evidence of infection  - As her wound has improved, she would like to defer referral to surgery.     Wound Care: Collagen, silicone adhesive foam      PATIENT EDUCATION  - Importance of adequate nutrition for wound healing  -Advised to go to ER for any increased redness, swelling, drainage, or odor, or if patient develops fever, chills, nausea or vomiting.     My total time spent caring for the patient on the day of the encounter was 20 minutes, reviewing history, assessment, counseling and education, and coordination of care.  This does not include time spent on separately billable procedures/tests.        Please note that this note may have been created using  voice recognition software. I have worked with technical experts from [facility] to optimize the interface.  I have made every reasonable attempt to correct obvious errors, but there may be errors of grammar and possibly content that I did not discover before finalizing the note.    N

## 2023-07-28 NOTE — PATIENT INSTRUCTIONS
Apply collagen moistened with normal saline to wound bed as directed by provider. Cover wound with foam dressing and change dressing every day.    Should you experience any significant changes in your wound(s), such as infection (redness, swelling, localized heat, increased pain, fever > 101 F, chills) or have any questions regarding your home care instructions, please contact the wound center at [phone]. If after hours, contact your primary care physician or go to the hospital emergency room.   Keep dressing clean, dry and covered while bathing. Only change dressing if it becomes over saturated, soiled or falls off.

## 2023-08-03 ENCOUNTER — TELEMEDICINE (OUTPATIENT)
Dept: MEDICAL GROUP | Facility: MEDICAL CENTER | Age: 53
End: 2023-08-03
Payer: COMMERCIAL

## 2023-08-03 VITALS — HEIGHT: 66 IN | WEIGHT: 210 LBS | BODY MASS INDEX: 33.75 KG/M2

## 2023-08-03 DIAGNOSIS — F32.A ANXIETY AND DEPRESSION: ICD-10-CM

## 2023-08-03 DIAGNOSIS — E03.4 HYPOTHYROIDISM DUE TO ACQUIRED ATROPHY OF THYROID: ICD-10-CM

## 2023-08-03 DIAGNOSIS — Z98.84 S/P BARIATRIC SURGERY: ICD-10-CM

## 2023-08-03 DIAGNOSIS — F41.9 ANXIETY AND DEPRESSION: ICD-10-CM

## 2023-08-03 PROCEDURE — 99214 OFFICE O/P EST MOD 30 MIN: CPT | Mod: 95 | Performed by: NURSE PRACTITIONER

## 2023-08-03 RX ORDER — CITALOPRAM 20 MG/1
20 TABLET ORAL DAILY
Qty: 90 TABLET | Refills: 0 | Status: SHIPPED | OUTPATIENT
Start: 2023-08-03 | End: 2024-02-29 | Stop reason: SDUPTHER

## 2023-08-03 ASSESSMENT — FIBROSIS 4 INDEX: FIB4 SCORE: 1.3

## 2023-08-03 NOTE — ASSESSMENT & PLAN NOTE
Chronic problem. Last visit placed on Lexapro.  Decided to remain on Celexa 20 mg as she does not want to make any new changes.  She is doing well, denies uncontrolled depression or anxiety.

## 2023-08-03 NOTE — PROGRESS NOTES
Virtual Visit: Established Patient   This visit was conducted via Zoom using secure and encrypted videoconferencing technology.   The patient was in their home in the [facility].    The patient's identity was confirmed and verbal consent was obtained for this virtual visit.     Subjective:   CC:   Chief Complaint   Patient presents with    Medication Follow-up     3 week med f/u        Leigh Whitley is a 53 y.o. female presenting for evaluation and management of:    Anxiety and depression  Chronic problem. Last visit placed on Lexapro.  Decided to remain on Celexa 20 mg as she does not want to make any new changes.  She is doing well, denies uncontrolled depression or anxiety.    S/P bariatric surgery  Ongoing problem.  Patient is currently recovering from postsurgical infection wounds.  She is on her last antibiotic is working well for her.  This was because of her stress and now that she is recovering she is doing well and not interested in switching her antidepressant current one is working well for her.    Hypothyroidism   Latest Reference Range & Units 06/02/23 09:38   TSH 0.380 - 5.330 uIU/mL 0.370 (L)   Free T-4 0.93 - 1.70 ng/dL 1.24   T3,Free 2.00 - 4.40 pg/mL 2.86   (L): Data is abnormally low  Stable.  Currently on levothyroxine 88 mcg.  Denies hyper or hyperthyroid symptoms.     ROS     Current medicines (including changes today)  Current Outpatient Medications   Medication Sig Dispense Refill    citalopram (CELEXA) 20 MG Tab Take 1 Tablet by mouth every day. 90 Tablet 0    escitalopram (LEXAPRO) 10 MG Tab Take 1 Tablet by mouth every day. 30 Tablet 1    MILK OF MAGNESIA 400 MG/5ML Suspension Take 5 ml by mouth once daily as needed for constipation. 43 mL 0    vitamin D2, Ergocalciferol, (DRISDOL) 1.25 MG (37974 UT) Cap capsule Take 1 Capsule by mouth every 7 days. 12 Capsule 3    docusate sodium (COLACE) 100 MG Cap Take 1 Capsule by mouth every day at 6 PM. 90 Capsule 1    levothyroxine  "(SYNTHROID) 88 MCG Tab Take 1 Tablet by mouth every morning on an empty stomach. 90 Tablet 1    Non Formulary Request Take 1 Capsule by mouth every day. Lions Jong Mushroom Capsules      therapeutic multivitamin-minerals (THERAGRAN-M) Tab Take 1 Tablet by mouth every day.      COLLAGEN PO Take 1 Capsule by mouth every day.      brimonidine (ALPHAGAN) 0.2 % Solution Administer 1 Drop into the right eye 2 times a day.       No current facility-administered medications for this visit.       Patient Active Problem List    Diagnosis Date Noted    Constipation 06/07/2023    Bone cyst of left ankle 05/31/2023    S/P bariatric surgery 04/27/2023    Weakness 04/27/2023    Obstructive sleep apnea syndrome 11/09/2022    Snoring 09/09/2022    Vitamin D deficiency 09/09/2022    Abnormal sensation of eye 08/31/2022    Onychocryptosis 05/27/2022    Mixed hyperlipidemia 07/01/2021    History of stroke 05/14/2021    Panic attacks 05/14/2021    Severe episode of recurrent major depressive disorder, without psychotic features (Formerly [facility]) 01/29/2020    ADD (attention deficit disorder) without hyperactivity 02/09/2018    Status post bariatric surgery 02/09/2018    GERD (gastroesophageal reflux disease) 02/09/2018    Environmental allergies 08/30/2017    Obesity (BMI 30-39.9) 08/30/2017    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  Skin: No rashes in visible areas.  Eye: Round. Conjunctiva clear, lids normal. No icterus.   ENMT: Lips pink without lesions, good dentition, moist mucous membranes. Phonation normal.  Neck: No masses, no thyromegaly. Moves freely without pain.  Respiratory: Unlabored respiratory effort, no cough or audible " wheeze  Psych: Alert and oriented x3, normal affect and mood.     Assessment and Plan:   The following treatment plan was discussed:   1. Anxiety and depression  Stable on current regimen.  Continue.  Refills given   - citalopram (CELEXA) 20 MG Tab; Take 1 Tablet by mouth every day.  Dispense: 90 Tablet; Refill: 0    2. S/P bariatric surgery  - TSH; Future  - T3 FREE; Future  - FREE THYROXINE; Future  - Comp Metabolic Panel; Future  - CBC WITHOUT DIFFERENTIAL; Future  - VITAMIN B12; Future    3. Hypothyroidism due to acquired atrophy of thyroid  Stable on current regimen.  Continue.         Discussed with patient possible alternative diagnoses, patient is to take all medications as prescribed.      If symptoms persist FU w/PCP, if symptoms worsen go to emergency room.      If experiencing any side effects from prescribed medications report to the office immediately or go to emergency room.     Reviewed indication, dosage, usage and potential adverse effects of prescribed medications.      Reviewed risks and benefits of treatment plan. Patient verbalizes understanding of all instruction and verbally agrees to plan.     Discussed plan with the patient, and patient agrees to the above.      I personally reviewed prior external notes and test results pertinent to today's visit.     Follow-up: 4 mos

## 2023-08-03 NOTE — ASSESSMENT & PLAN NOTE
Ongoing problem.  Patient is currently recovering from postsurgical infection wounds.  She is on her last antibiotic is working well for her.  This was because of her stress and now that she is recovering she is doing well and not interested in switching her antidepressant current one is working well for her.

## 2023-08-03 NOTE — ASSESSMENT & PLAN NOTE
Latest Reference Range & Units 06/02/23 09:38   TSH 0.380 - 5.330 uIU/mL 0.370 (L)   Free T-4 0.93 - 1.70 ng/dL 1.24   T3,Free 2.00 - 4.40 pg/mL 2.86   (L): Data is abnormally low  Stable.  Currently on levothyroxine 88 mcg.  Denies hyper or hyperthyroid symptoms.

## 2023-08-11 ENCOUNTER — NON-PROVIDER VISIT (OUTPATIENT)
Dept: WOUND CARE | Facility: MEDICAL CENTER | Age: 53
End: 2023-08-11
Attending: STUDENT IN AN ORGANIZED HEALTH CARE EDUCATION/TRAINING PROGRAM
Payer: COMMERCIAL

## 2023-08-11 PROCEDURE — 99211 OFF/OP EST MAY X REQ PHY/QHP: CPT

## 2023-08-11 NOTE — PATIENT INSTRUCTIONS
- Resolved wound be fragile for a few days, bathe and dry area gently, only ever regains a maximum of 80% of the tensile strength of the surrounding skin, remodeling of scar can continue for 6mo - a year. Contact PCP for a referral back to wound care if any problems with area opening and draining again.    -Should you experience any significant changes in your wound(s), such as infection (redness, swelling, localized heat, increased pain, fever > 101 F, chills) or have any questions regarding your home care instructions, please contact the wound center at [phone]. If after hours, contact your primary care physician or go to the hospital emergency room.

## 2023-08-15 ENCOUNTER — TELEPHONE (OUTPATIENT)
Dept: CARDIOLOGY | Facility: MEDICAL CENTER | Age: 53
End: 2023-08-15
Payer: COMMERCIAL

## 2023-08-15 NOTE — TELEPHONE ENCOUNTER
Called patient in regards to records for NP appointment with Dr. ANGUIANO. To review most recent lab results, ekg, any cardiac testing or ,if they had been treated by a cardiologist. No answer, left voicemail to call back.

## 2023-08-21 ENCOUNTER — APPOINTMENT (OUTPATIENT)
Dept: CARDIOLOGY | Facility: MEDICAL CENTER | Age: 53
End: 2023-08-21
Attending: INTERNAL MEDICINE
Payer: COMMERCIAL

## 2023-08-22 NOTE — PROGRESS NOTES
Cardiology Initial Consultation Note    Date of note:    8/23/2023    Primary Care Provider: DAVIDSON Ruth  Referring Provider: Lauren Marx A.P.R*     Patient Name: Leigh Whitley   YOB: 1970  MRN:              [id number]    Chief Complaint: Dizziness    History of Present Illness: Ms. Leigh Whitley is a 53 y.o. female whose current medical problems include dyslipidemia, hypothyroidism, Lyme disease, and s/p bariatric surgery who is here for cardiac consultation for dizziness.     The patient was evaluated by her PCP 8/3/2023. Due to hypotension and dizziness, she was referred to cardiology clinic for further workup.     The patient presents today to establish care.  The patient reports that she has been having lightheadedness for the last few years.  She initially thought it was due to bariatric surgery and possibly also Lyme disease.  However, the patient continues to have lightheadedness with position changes.  She also has vision changes with her heartbeat from time to time, not triggered by anything in particular.  She denies any chest pain or shortness of breath on exertion.  She denies any orthopnea, PND, leg swelling.  No syncope.    Cardiovascular Risk Factors:  1. Smoking status: Never smoker  2. Type II Diabetes Mellitus: None   Lab Results   Component Value Date/Time    HBA1C 5.1 06/02/2023 09:38 AM     3. Hypertension: None  4. Dyslipidemia: Diet controlled   Cholesterol,Tot   Date Value Ref Range Status   06/02/2023 208 (H) 100 - 199 mg/dL Final     LDL   Date Value Ref Range Status   06/02/2023 141 (H) <100 mg/dL Final     HDL   Date Value Ref Range Status   06/02/2023 46 >=40 mg/dL Final     Triglycerides   Date Value Ref Range Status   06/02/2023 107 0 - 149 mg/dL Final     5. Family history of early Coronary Artery Disease in a first degree relative (Male less than 55 years of age; Female less than 65 years of age): None  6.  Obesity and/or Metabolic  "Syndrome: Body mass index is 35.19 kg/m².  7. Sedentary lifestyle: Not sedentary    Review of Systems   Constitutional: Negative for fever, malaise/fatigue and night sweats.   Cardiovascular:  Negative for chest pain, dyspnea on exertion, irregular heartbeat, leg swelling, near-syncope, orthopnea, palpitations, paroxysmal nocturnal dyspnea and syncope.   Respiratory:  Negative for cough, shortness of breath and wheezing.    Gastrointestinal:  Negative for abdominal pain, diarrhea, nausea and vomiting.   Neurological:  Negative for dizziness, focal weakness and weakness.         All other systems reviewed and are negative.       Current Outpatient Medications   Medication Sig Dispense Refill    citalopram (CELEXA) 20 MG Tab Take 1 Tablet by mouth every day. 90 Tablet 0    vitamin D2, Ergocalciferol, (DRISDOL) 1.25 MG (20527 UT) Cap capsule Take 1 Capsule by mouth every 7 days. 12 Capsule 3    docusate sodium (COLACE) 100 MG Cap Take 1 Capsule by mouth every day at 6 PM. 90 Capsule 1    levothyroxine (SYNTHROID) 88 MCG Tab Take 1 Tablet by mouth every morning on an empty stomach. 90 Tablet 1    Non Formulary Request Take 1 Capsule by mouth every day. [employer] Mushroom Capsules      therapeutic multivitamin-minerals (THERAGRAN-M) Tab Take 1 Tablet by mouth every day.      COLLAGEN PO Take 1 Capsule by mouth every day.      brimonidine (ALPHAGAN) 0.2 % Solution Administer 1 Drop into the right eye 2 times a day.       No current facility-administered medications for this visit.         Allergies   Allergen Reactions    Ultram [Tramadol Hcl] Itching       Physical Exam:  Ambulatory Vitals  BP (!) 88/50 (BP Location: Left arm, Patient Position: Standing, BP Cuff Size: Adult)   Pulse 63   Resp 16   Ht 1.676 m (5' 6\")   Wt 98.9 kg (218 lb)   SpO2 99%    Oxygen Therapy:  Pulse Oximetry: 99 %  BP Readings from Last 4 Encounters:   08/23/23 (!) 88/50   07/28/23 (!) 89/50   07/13/23 126/72   05/25/23 114/69 "       Weight/BMI: Body mass index is 35.19 kg/m².  Wt Readings from Last 4 Encounters:   08/23/23 98.9 kg (218 lb)   08/03/23 95.3 kg (210 lb)   07/13/23 95.5 kg (210 lb 10.4 oz)   06/07/23 93.9 kg (207 lb)         General: Well appearing and in no apparent distress  Eyes: nl conjunctiva, no icteric sclera  ENT: normal external appearance of ears, nose, and throat  Neck: no visible JVP,  no carotid bruits  Lungs: normal respiratory effort, CTAB  Heart: RRR, no murmurs, no rubs or gallops,  no edema bilateral lower extremities. No LV/RV heave on cardiac palpatation. + bilateral radial pulses.  + bilateral dp pulses.   Abdomen: soft, non tender, non distended, no masses, normal bowel sounds.  No HSM.  Extremities/MSK: no clubbing, no cyanosis  Neurological: No focal sensory deficits  Psychiatric: Appropriate affect, A/O x 3, intact judgement and insight  Skin: Warm extremities      Lab Data Review:  Lab Results   Component Value Date/Time    CHOLSTRLTOT 208 (H) 06/02/2023 09:38 AM     (H) 06/02/2023 09:38 AM    HDL 46 06/02/2023 09:38 AM    TRIGLYCERIDE 107 06/02/2023 09:38 AM       Lab Results   Component Value Date/Time    SODIUM 143 06/02/2023 09:38 AM    POTASSIUM 4.8 06/02/2023 09:38 AM    CHLORIDE 107 06/02/2023 09:38 AM    CO2 24 06/02/2023 09:38 AM    GLUCOSE 95 06/02/2023 09:38 AM    BUN 24 (H) 06/02/2023 09:38 AM    CREATININE 0.79 06/02/2023 09:38 AM    BUNCREATRAT 19 05/18/2022 05:56 AM     Lab Results   Component Value Date/Time    ALKPHOSPHAT 73 06/02/2023 09:38 AM    ASTSGOT 22 06/02/2023 09:38 AM    ALTSGPT 18 06/02/2023 09:38 AM    TBILIRUBIN 0.3 06/02/2023 09:38 AM      Lab Results   Component Value Date/Time    WBC 4.3 (L) 06/02/2023 09:38 AM    HEMOGLOBIN 13.5 06/02/2023 09:38 AM     Lab Results   Component Value Date/Time    HBA1C 5.1 06/02/2023 09:38 AM         Cardiac Imaging and Procedures Review:    EKG dated 5/4/2023: My personal interpretation is sinus bradycardia    No prior  echocardiogram    Assessment & Plan     1. Lightheadedness  EC-ECHOCARDIOGRAM COMPLETE W/O CONT    US-CAROTID DOPPLER BILAT    Holter Monitor Study    Tilt Table Test      2. Vision changes  US-CAROTID DOPPLER BILAT      3. Abnormal EKG  EC-ECHOCARDIOGRAM COMPLETE W/O CONT    Holter Monitor Study    Tilt Table Test      4. Dyslipidemia              Shared Medical Decision Making:    Dizziness  Vision changes  Abnormal EKG  Orthostatic vitals negative this visit (supine 100/58, pulse 66, sitting 100/60, pulse 70, standing 88/50, pulse 63)  -We will  plan for event monitor to assess for any bradyarrhythmias and/or tachyarrhythmias.  -We will plan for echocardiogram given symptoms of lightheadedness and abnormal EKG  -We will plan for tilt table test to assess for POTS versus orthostatic hypotension  -Recent labs reviewed with no evidence of anemia, electrolyte abnormalities, or thyroid abnormalities  -We will also obtain carotid ultrasound for complaints of vision changes with dizziness.    Dyslipidemia  The ASCVD Risk score (Frantz DK, et al., 2019) failed to calculate.  -Counseled on heart healthy diet    All of the patient's excellent questions were answered to the best of my knowledge and to her satisfaction.  It was a pleasure seeing Ms. Leigh Whitley in my clinic today. Return in about 2 months (around 10/23/2023). Patient is aware to call the cardiology clinic with any questions or concerns.      Kerri Martinez MD  [facility]  [facility], [address]  [address] [address]  Phone: [phone]  Fax: [phone]

## 2023-08-23 ENCOUNTER — OFFICE VISIT (OUTPATIENT)
Dept: CARDIOLOGY | Facility: MEDICAL CENTER | Age: 53
End: 2023-08-23
Attending: STUDENT IN AN ORGANIZED HEALTH CARE EDUCATION/TRAINING PROGRAM
Payer: COMMERCIAL

## 2023-08-23 VITALS
HEART RATE: 63 BPM | WEIGHT: 218 LBS | HEIGHT: 66 IN | RESPIRATION RATE: 16 BRPM | OXYGEN SATURATION: 99 % | DIASTOLIC BLOOD PRESSURE: 50 MMHG | SYSTOLIC BLOOD PRESSURE: 88 MMHG | BODY MASS INDEX: 35.03 KG/M2

## 2023-08-23 DIAGNOSIS — R94.31 ABNORMAL EKG: ICD-10-CM

## 2023-08-23 DIAGNOSIS — E78.5 DYSLIPIDEMIA: ICD-10-CM

## 2023-08-23 DIAGNOSIS — R42 LIGHTHEADEDNESS: ICD-10-CM

## 2023-08-23 DIAGNOSIS — H53.9 VISION CHANGES: ICD-10-CM

## 2023-08-23 LAB — LV EJECT FRACT MOD 4C 99902: 51.32

## 2023-08-23 PROCEDURE — 3074F SYST BP LT 130 MM HG: CPT | Performed by: STUDENT IN AN ORGANIZED HEALTH CARE EDUCATION/TRAINING PROGRAM

## 2023-08-23 PROCEDURE — 99212 OFFICE O/P EST SF 10 MIN: CPT | Performed by: STUDENT IN AN ORGANIZED HEALTH CARE EDUCATION/TRAINING PROGRAM

## 2023-08-23 PROCEDURE — 93306 TTE W/DOPPLER COMPLETE: CPT

## 2023-08-23 PROCEDURE — 3078F DIAST BP <80 MM HG: CPT | Performed by: STUDENT IN AN ORGANIZED HEALTH CARE EDUCATION/TRAINING PROGRAM

## 2023-08-23 PROCEDURE — 99204 OFFICE O/P NEW MOD 45 MIN: CPT | Performed by: STUDENT IN AN ORGANIZED HEALTH CARE EDUCATION/TRAINING PROGRAM

## 2023-08-23 PROCEDURE — 93306 TTE W/DOPPLER COMPLETE: CPT | Mod: 26 | Performed by: STUDENT IN AN ORGANIZED HEALTH CARE EDUCATION/TRAINING PROGRAM

## 2023-08-23 ASSESSMENT — ENCOUNTER SYMPTOMS
IRREGULAR HEARTBEAT: 0
SYNCOPE: 0
FEVER: 0
ABDOMINAL PAIN: 0
SHORTNESS OF BREATH: 0
WHEEZING: 0
COUGH: 0
NAUSEA: 0
DYSPNEA ON EXERTION: 0
DIARRHEA: 0
VOMITING: 0
PALPITATIONS: 0
DIZZINESS: 0
NIGHT SWEATS: 0
FOCAL WEAKNESS: 0
PND: 0
NEAR-SYNCOPE: 0
WEAKNESS: 0
ORTHOPNEA: 0

## 2023-08-23 ASSESSMENT — FIBROSIS 4 INDEX: FIB4 SCORE: 1.3

## 2023-08-29 ENCOUNTER — HOSPITAL ENCOUNTER (OUTPATIENT)
Dept: RADIOLOGY | Facility: MEDICAL CENTER | Age: 53
End: 2023-08-29
Attending: STUDENT IN AN ORGANIZED HEALTH CARE EDUCATION/TRAINING PROGRAM
Payer: COMMERCIAL

## 2023-08-29 DIAGNOSIS — H53.9 VISION CHANGES: ICD-10-CM

## 2023-08-29 DIAGNOSIS — R42 LIGHTHEADEDNESS: ICD-10-CM

## 2023-08-29 PROCEDURE — 93880 EXTRACRANIAL BILAT STUDY: CPT

## 2023-08-29 PROCEDURE — 93880 EXTRACRANIAL BILAT STUDY: CPT | Mod: 26 | Performed by: INTERNAL MEDICINE

## 2023-09-12 ENCOUNTER — APPOINTMENT (OUTPATIENT)
Dept: CARDIOLOGY | Facility: MEDICAL CENTER | Age: 53
End: 2023-09-12
Attending: STUDENT IN AN ORGANIZED HEALTH CARE EDUCATION/TRAINING PROGRAM
Payer: COMMERCIAL

## 2023-09-12 ENCOUNTER — TELEPHONE (OUTPATIENT)
Dept: CARDIOLOGY | Facility: MEDICAL CENTER | Age: 53
End: 2023-09-12
Payer: COMMERCIAL

## 2023-09-12 DIAGNOSIS — R42 LIGHTHEADEDNESS: ICD-10-CM

## 2023-09-12 DIAGNOSIS — H53.9 VISION CHANGES: ICD-10-CM

## 2023-09-12 DIAGNOSIS — R94.31 ABNORMAL EKG: ICD-10-CM

## 2023-09-12 NOTE — TELEPHONE ENCOUNTER
HK            Caller: Nico Gaytan with radiology/surgery scheduling for the cardio group    Topic/issue: They were calling to let us know that the there needed to be a new order issued for the patient's tilt table test and indicating yes to both questions regarding if the patient can stand and bear weight    Callback Number: see below    Thank you    -Miles SORENSON

## 2023-09-13 NOTE — TELEPHONE ENCOUNTER
Kerri Martinez M.D.  You 17 minutes ago (3:39 PM)       Yes, she can bear weight and stand. Thank you for catching this!

## 2023-09-13 NOTE — TELEPHONE ENCOUNTER
Hi Dr. Martinez,    When you ordered test you selected NO patient can not bear weight during test.    Is it okay to reorder indicating yes patient can stand and bear weight?

## 2023-10-26 ENCOUNTER — NON-PROVIDER VISIT (OUTPATIENT)
Dept: CARDIOLOGY | Facility: MEDICAL CENTER | Age: 53
End: 2023-10-26
Attending: STUDENT IN AN ORGANIZED HEALTH CARE EDUCATION/TRAINING PROGRAM
Payer: COMMERCIAL

## 2023-10-26 ENCOUNTER — APPOINTMENT (OUTPATIENT)
Dept: CARDIOLOGY | Facility: MEDICAL CENTER | Age: 53
End: 2023-10-26
Attending: NURSE PRACTITIONER
Payer: COMMERCIAL

## 2023-10-26 DIAGNOSIS — I49.1 APC (ATRIAL PREMATURE CONTRACTIONS): ICD-10-CM

## 2023-10-26 DIAGNOSIS — I49.3 PVC'S (PREMATURE VENTRICULAR CONTRACTIONS): ICD-10-CM

## 2023-10-26 DIAGNOSIS — I47.10 SVT (SUPRAVENTRICULAR TACHYCARDIA) (HCC): ICD-10-CM

## 2023-10-26 DIAGNOSIS — R94.31 ABNORMAL EKG: ICD-10-CM

## 2023-10-26 DIAGNOSIS — R42 LIGHTHEADEDNESS: ICD-10-CM

## 2023-10-26 NOTE — PROGRESS NOTES
Home enrollment completed in the 14 day [employer] Holter monitoring program per Kerri Martinez MD.  Monitor will be shipped to patient via [employer]. Pending EOS.

## 2023-11-01 DIAGNOSIS — K59.00 CONSTIPATION, UNSPECIFIED CONSTIPATION TYPE: ICD-10-CM

## 2023-11-01 RX ORDER — DOCUSATE SODIUM 100 MG/1
CAPSULE, LIQUID FILLED ORAL
Qty: 90 CAPSULE | Refills: 0 | Status: SHIPPED | OUTPATIENT
Start: 2023-11-01 | End: 2024-01-30

## 2023-11-01 NOTE — TELEPHONE ENCOUNTER
Received request via: Pharmacy    Was the patient seen in the last year in this department? Yes    Does the patient have an active prescription (recently filled or refills available) for medication(s) requested? yes    Does the patient have [facility] and need 100 day supply (blood pressure, diabetes and cholesterol meds only)? Patient does not have SCP   Requested Prescriptions     Pending Prescriptions Disp Refills    docusate sodium (COLACE) 100 MG Cap [Pharmacy Med Name: Docusate Sodium 100mg Softgel] 90 Capsule 0     Sig: Take 1 capsule by mouth daily at 6 pm.

## 2023-11-29 ENCOUNTER — HOSPITAL ENCOUNTER (OUTPATIENT)
Dept: CARDIOLOGY | Facility: MEDICAL CENTER | Age: 53
End: 2023-11-29
Attending: STUDENT IN AN ORGANIZED HEALTH CARE EDUCATION/TRAINING PROGRAM
Payer: COMMERCIAL

## 2023-11-29 ENCOUNTER — TELEPHONE (OUTPATIENT)
Dept: CARDIOLOGY | Facility: MEDICAL CENTER | Age: 53
End: 2023-11-29

## 2023-11-29 PROCEDURE — 93248 EXT ECG>7D<15D REV&INTERPJ: CPT | Mod: 26 | Performed by: STUDENT IN AN ORGANIZED HEALTH CARE EDUCATION/TRAINING PROGRAM

## 2024-01-03 ENCOUNTER — APPOINTMENT (RX ONLY)
Dept: URBAN - METROPOLITAN AREA CLINIC 31 | Facility: CLINIC | Age: 54
Setting detail: DERMATOLOGY
End: 2024-01-03

## 2024-01-03 DIAGNOSIS — L57.0 ACTINIC KERATOSIS: ICD-10-CM

## 2024-01-03 DIAGNOSIS — L20.89 OTHER ATOPIC DERMATITIS: ICD-10-CM

## 2024-01-03 DIAGNOSIS — L90.5 SCAR CONDITIONS AND FIBROSIS OF SKIN: ICD-10-CM

## 2024-01-03 DIAGNOSIS — L70.0 ACNE VULGARIS: ICD-10-CM

## 2024-01-03 PROCEDURE — 99396 PREV VISIT EST AGE 40-64: CPT | Mod: 25

## 2024-01-03 PROCEDURE — ? LIQUID NITROGEN

## 2024-01-03 PROCEDURE — ? COUNSELING

## 2024-01-03 PROCEDURE — ? DEFER

## 2024-01-03 PROCEDURE — ? PRESCRIPTION

## 2024-01-03 PROCEDURE — ? ADDITIONAL NOTES

## 2024-01-03 PROCEDURE — 17000 DESTRUCT PREMALG LESION: CPT

## 2024-01-03 RX ORDER — PIMECROLIMUS 10 MG/G
CREAM TOPICAL
Qty: 60 | Refills: 3 | Status: ERX | COMMUNITY
Start: 2024-01-03

## 2024-01-03 RX ORDER — TRIAMCINOLONE ACETONIDE 1 MG/G
CREAM TOPICAL BID
Qty: 30 | Refills: 2 | Status: ERX | COMMUNITY
Start: 2024-01-03

## 2024-01-03 RX ADMIN — TRIAMCINOLONE ACETONIDE: 1 CREAM TOPICAL at 00:00

## 2024-01-03 RX ADMIN — PIMECROLIMUS: 10 CREAM TOPICAL at 00:00

## 2024-01-03 ASSESSMENT — LOCATION DETAILED DESCRIPTION DERM
LOCATION DETAILED: LEFT INFERIOR CENTRAL MALAR CHEEK
LOCATION DETAILED: LEFT LATERAL ABDOMEN
LOCATION DETAILED: LEFT UPPER CUTANEOUS LIP
LOCATION DETAILED: LEFT BUTTOCK
LOCATION DETAILED: RIGHT BUTTOCK
LOCATION DETAILED: RIGHT INFERIOR CENTRAL MALAR CHEEK
LOCATION DETAILED: RIGHT LATERAL ABDOMEN
LOCATION DETAILED: RIGHT SUPRAPUBIC SKIN
LOCATION DETAILED: LEFT MEDIAL FOREHEAD
LOCATION DETAILED: RIGHT SUPERIOR MEDIAL UPPER BACK

## 2024-01-03 ASSESSMENT — LOCATION ZONE DERM
LOCATION ZONE: FACE
LOCATION ZONE: TRUNK
LOCATION ZONE: LIP

## 2024-01-03 ASSESSMENT — LOCATION SIMPLE DESCRIPTION DERM
LOCATION SIMPLE: LEFT CHEEK
LOCATION SIMPLE: ABDOMEN
LOCATION SIMPLE: LEFT BUTTOCK
LOCATION SIMPLE: RIGHT CHEEK
LOCATION SIMPLE: RIGHT UPPER BACK
LOCATION SIMPLE: LEFT FOREHEAD
LOCATION SIMPLE: LEFT LIP
LOCATION SIMPLE: RIGHT BUTTOCK
LOCATION SIMPLE: GROIN

## 2024-01-03 NOTE — PROCEDURE: COUNSELING
Detail Level: Zone
Topical Retinoid counseling:  Patient advised to apply a pea-sized amount only at bedtime and wait 30 minutes after washing their face before applying.  If too drying, patient may add a non-comedogenic moisturizer. The patient verbalized understanding of the proper use and possible adverse effects of retinoids.  All of the patient's questions and concerns were addressed.
Topical Sulfur Applications Pregnancy And Lactation Text: This medication is Pregnancy Category C and has an unknown safety profile during pregnancy. It is unknown if this topical medication is excreted in breast milk.
Dapsone Pregnancy And Lactation Text: This medication is Pregnancy Category C and is not considered safe during pregnancy or breast feeding.
Doxycycline Pregnancy And Lactation Text: This medication is Pregnancy Category D and not consider safe during pregnancy. It is also excreted in breast milk but is considered safe for shorter treatment courses.
Winlevi Pregnancy And Lactation Text: This medication is considered safe during pregnancy and breastfeeding.
Tetracycline Pregnancy And Lactation Text: This medication is Pregnancy Category D and not consider safe during pregnancy. It is also excreted in breast milk.
Minocycline Counseling: Patient advised regarding possible photosensitivity and discoloration of the teeth, skin, lips, tongue and gums.  Patient instructed to avoid sunlight, if possible.  When exposed to sunlight, patients should wear protective clothing, sunglasses, and sunscreen.  The patient was instructed to call the office immediately if the following severe adverse effects occur:  hearing changes, easy bruising/bleeding, severe headache, or vision changes.  The patient verbalized understanding of the proper use and possible adverse effects of minocycline.  All of the patient's questions and concerns were addressed.
Use Enhanced Medication Counseling?: No
Tazorac Counseling:  Patient advised that medication is irritating and drying.  Patient may need to apply sparingly and wash off after an hour before eventually leaving it on overnight.  The patient verbalized understanding of the proper use and possible adverse effects of tazorac.  All of the patient's questions and concerns were addressed.
Aklief Pregnancy And Lactation Text: It is unknown if this medication is safe to use during pregnancy.  It is unknown if this medication is excreted in breast milk.  Breastfeeding women should use the topical cream on the smallest area of the skin for the shortest time needed while breastfeeding.  Do not apply to nipple and areola.
Tazorac Pregnancy And Lactation Text: This medication is not safe during pregnancy. It is unknown if this medication is excreted in breast milk.
Azelaic Acid Counseling: Patient counseled that medicine may cause skin irritation and to avoid applying near the eyes.  In the event of skin irritation, the patient was advised to reduce the amount of the drug applied or use it less frequently.   The patient verbalized understanding of the proper use and possible adverse effects of azelaic acid.  All of the patient's questions and concerns were addressed.
Azithromycin Pregnancy And Lactation Text: This medication is considered safe during pregnancy and is also secreted in breast milk.
Birth Control Pills Pregnancy And Lactation Text: This medication should be avoided if pregnant and for the first 30 days post-partum.
Topical Clindamycin Pregnancy And Lactation Text: This medication is Pregnancy Category B and is considered safe during pregnancy. It is unknown if it is excreted in breast milk.
Isotretinoin Counseling: Patient should get monthly blood tests, not donate blood, not drive at night if vision affected, not share medication, and not undergo elective surgery for 6 months after tx completed. Side effects reviewed, pt to contact office should one occur.
High Dose Vitamin A Counseling: Side effects reviewed, pt to contact office should one occur.
Spironolactone Pregnancy And Lactation Text: This medication can cause feminization of the male fetus and should be avoided during pregnancy. The active metabolite is also found in breast milk.
Benzoyl Peroxide Counseling: Patient counseled that medicine may cause skin irritation and bleach clothing.  In the event of skin irritation, the patient was advised to reduce the amount of the drug applied or use it less frequently.   The patient verbalized understanding of the proper use and possible adverse effects of benzoyl peroxide.  All of the patient's questions and concerns were addressed.
Bactrim Pregnancy And Lactation Text: This medication is Pregnancy Category D and is known to cause fetal risk.  It is also excreted in breast milk.
Tetracycline Counseling: Patient counseled regarding possible photosensitivity and increased risk for sunburn.  Patient instructed to avoid sunlight, if possible.  When exposed to sunlight, patients should wear protective clothing, sunglasses, and sunscreen.  The patient was instructed to call the office immediately if the following severe adverse effects occur:  hearing changes, easy bruising/bleeding, severe headache, or vision changes.  The patient verbalized understanding of the proper use and possible adverse effects of tetracycline.  All of the patient's questions and concerns were addressed. Patient understands to avoid pregnancy while on therapy due to potential birth defects.
High Dose Vitamin A Pregnancy And Lactation Text: High dose vitamin A therapy is contraindicated during pregnancy and breast feeding.
Aklief counseling:  Patient advised to apply a pea-sized amount only at bedtime and wait 30 minutes after washing their face before applying.  If too drying, patient may add a non-comedogenic moisturizer.  The most commonly reported side effects including irritation, redness, scaling, dryness, stinging, burning, itching, and increased risk of sunburn.  The patient verbalized understanding of the proper use and possible adverse effects of retinoids.  All of the patient's questions and concerns were addressed.
Doxycycline Counseling:  Patient counseled regarding possible photosensitivity and increased risk for sunburn.  Patient instructed to avoid sunlight, if possible.  When exposed to sunlight, patients should wear protective clothing, sunglasses, and sunscreen.  The patient was instructed to call the office immediately if the following severe adverse effects occur:  hearing changes, easy bruising/bleeding, severe headache, or vision changes.  The patient verbalized understanding of the proper use and possible adverse effects of doxycycline.  All of the patient's questions and concerns were addressed.
Winlevi Counseling:  I discussed with the patient the risks of topical clascoterone including but not limited to erythema, scaling, itching, and stinging. Patient voiced their understanding.
Topical Retinoid Pregnancy And Lactation Text: This medication is Pregnancy Category C. It is unknown if this medication is excreted in breast milk.
Erythromycin Counseling:  I discussed with the patient the risks of erythromycin including but not limited to GI upset, allergic reaction, drug rash, diarrhea, increase in liver enzymes, and yeast infections.
Sarecycline Counseling: Patient advised regarding possible photosensitivity and discoloration of the teeth, skin, lips, tongue and gums.  Patient instructed to avoid sunlight, if possible.  When exposed to sunlight, patients should wear protective clothing, sunglasses, and sunscreen.  The patient was instructed to call the office immediately if the following severe adverse effects occur:  hearing changes, easy bruising/bleeding, severe headache, or vision changes.  The patient verbalized understanding of the proper use and possible adverse effects of sarecycline.  All of the patient's questions and concerns were addressed.
Topical Clindamycin Counseling: Patient counseled that this medication may cause skin irritation or allergic reactions.  In the event of skin irritation, the patient was advised to reduce the amount of the drug applied or use it less frequently.   The patient verbalized understanding of the proper use and possible adverse effects of clindamycin.  All of the patient's questions and concerns were addressed.
Erythromycin Pregnancy And Lactation Text: This medication is Pregnancy Category B and is considered safe during pregnancy. It is also excreted in breast milk.
Birth Control Pills Counseling: Birth Control Pill Counseling: I discussed with the patient the potential side effects of OCPs including but not limited to increased risk of stroke, heart attack, thrombophlebitis, deep venous thrombosis, hepatic adenomas, breast changes, GI upset, headaches, and depression.  The patient verbalized understanding of the proper use and possible adverse effects of OCPs. All of the patient's questions and concerns were addressed.
Azithromycin Counseling:  I discussed with the patient the risks of azithromycin including but not limited to GI upset, allergic reaction, drug rash, diarrhea, and yeast infections.
Spironolactone Counseling: Patient advised regarding risks of diarrhea, abdominal pain, hyperkalemia, birth defects (for female patients), liver toxicity and renal toxicity. The patient may need blood work to monitor liver and kidney function and potassium levels while on therapy. The patient verbalized understanding of the proper use and possible adverse effects of spironolactone.  All of the patient's questions and concerns were addressed.
Azelaic Acid Pregnancy And Lactation Text: This medication is considered safe during pregnancy and breast feeding.
Dapsone Counseling: I discussed with the patient the risks of dapsone including but not limited to hemolytic anemia, agranulocytosis, rashes, methemoglobinemia, kidney failure, peripheral neuropathy, headaches, GI upset, and liver toxicity.  Patients who start dapsone require monitoring including baseline LFTs and weekly CBCs for the first month, then every month thereafter.  The patient verbalized understanding of the proper use and possible adverse effects of dapsone.  All of the patient's questions and concerns were addressed.
Topical Sulfur Applications Counseling: Topical Sulfur Counseling: Patient counseled that this medication may cause skin irritation or allergic reactions.  In the event of skin irritation, the patient was advised to reduce the amount of the drug applied or use it less frequently.   The patient verbalized understanding of the proper use and possible adverse effects of topical sulfur application.  All of the patient's questions and concerns were addressed.
Benzoyl Peroxide Pregnancy And Lactation Text: This medication is Pregnancy Category C. It is unknown if benzoyl peroxide is excreted in breast milk.
Bactrim Counseling:  I discussed with the patient the risks of sulfa antibiotics including but not limited to GI upset, allergic reaction, drug rash, diarrhea, dizziness, photosensitivity, and yeast infections.  Rarely, more serious reactions can occur including but not limited to aplastic anemia, agranulocytosis, methemoglobinemia, blood dyscrasias, liver or kidney failure, lung infiltrates or desquamative/blistering drug rashes.
Isotretinoin Pregnancy And Lactation Text: This medication is Pregnancy Category X and is considered extremely dangerous during pregnancy. It is unknown if it is excreted in breast milk.
Detail Level: Detailed

## 2024-01-03 NOTE — PROCEDURE: DEFER
Size Of Lesion In Cm (Optional): 0
Introduction Text (Please End With A Colon): The following procedure was deferred: rx, pt is using at home treatment regimen:
Detail Level: Detailed

## 2024-01-30 DIAGNOSIS — K59.00 CONSTIPATION, UNSPECIFIED CONSTIPATION TYPE: ICD-10-CM

## 2024-01-30 RX ORDER — DOCUSATE SODIUM 100 MG/1
CAPSULE, LIQUID FILLED ORAL
Qty: 30 CAPSULE | Refills: 0 | Status: SHIPPED | OUTPATIENT
Start: 2024-01-30 | End: 2024-03-06

## 2024-01-31 DIAGNOSIS — E03.4 HYPOTHYROIDISM DUE TO ACQUIRED ATROPHY OF THYROID: ICD-10-CM

## 2024-01-31 RX ORDER — LEVOTHYROXINE SODIUM 88 UG/1
88 TABLET ORAL
Qty: 60 TABLET | Refills: 0 | Status: SHIPPED | OUTPATIENT
Start: 2024-01-31 | End: 2024-02-29 | Stop reason: SDUPTHER

## 2024-01-31 NOTE — TELEPHONE ENCOUNTER
Received request via: Pharmacy    Was the patient seen in the last year in this department? Yes    Does the patient have an active prescription (recently filled or refills available) for medication(s) requested? No    [email or web address]    Does the patient have [facility] and need 100 day supply (blood pressure, diabetes and cholesterol meds only)? Patient does not have SCP

## 2024-02-29 ENCOUNTER — TELEMEDICINE (OUTPATIENT)
Dept: MEDICAL GROUP | Facility: MEDICAL CENTER | Age: 54
End: 2024-02-29
Payer: COMMERCIAL

## 2024-02-29 VITALS — BODY MASS INDEX: 35.36 KG/M2 | WEIGHT: 220 LBS | RESPIRATION RATE: 16 BRPM | HEIGHT: 66 IN

## 2024-02-29 DIAGNOSIS — E03.4 HYPOTHYROIDISM DUE TO ACQUIRED ATROPHY OF THYROID: ICD-10-CM

## 2024-02-29 DIAGNOSIS — F41.9 ANXIETY AND DEPRESSION: ICD-10-CM

## 2024-02-29 DIAGNOSIS — M25.551 PAIN OF RIGHT HIP: ICD-10-CM

## 2024-02-29 DIAGNOSIS — M25.572 CHRONIC PAIN OF LEFT ANKLE: ICD-10-CM

## 2024-02-29 DIAGNOSIS — R63.5 WEIGHT GAIN: ICD-10-CM

## 2024-02-29 DIAGNOSIS — Z00.00 PE (PHYSICAL EXAM), ANNUAL: ICD-10-CM

## 2024-02-29 DIAGNOSIS — G89.29 CHRONIC PAIN OF LEFT ANKLE: ICD-10-CM

## 2024-02-29 DIAGNOSIS — F32.A ANXIETY AND DEPRESSION: ICD-10-CM

## 2024-02-29 PROCEDURE — 99214 OFFICE O/P EST MOD 30 MIN: CPT | Mod: 95 | Performed by: NURSE PRACTITIONER

## 2024-02-29 RX ORDER — CITALOPRAM 20 MG/1
40 TABLET ORAL DAILY
Qty: 180 TABLET | Refills: 0 | Status: SHIPPED | OUTPATIENT
Start: 2024-02-29

## 2024-02-29 RX ORDER — LEVOTHYROXINE SODIUM 88 UG/1
88 TABLET ORAL
Qty: 30 TABLET | Refills: 0 | Status: SHIPPED | OUTPATIENT
Start: 2024-02-29

## 2024-02-29 ASSESSMENT — PATIENT HEALTH QUESTIONNAIRE - PHQ9: CLINICAL INTERPRETATION OF PHQ2 SCORE: 0

## 2024-02-29 ASSESSMENT — FIBROSIS 4 INDEX: FIB4 SCORE: 1.3

## 2024-02-29 NOTE — ASSESSMENT & PLAN NOTE
Chronic problem. Admits to seasonal depression, denies SI. Taking Celexa 20 mg. Seeing a therapist, needs to re-schedule.

## 2024-02-29 NOTE — ASSESSMENT & PLAN NOTE
Chronic condition. Taking Lexothyroxine 88mcg. Last TSH from 6/2023   Latest Reference Range & Units 05/18/22 05:56 06/02/23 09:38   TSH 0.380 - 5.330 uIU/mL 0.617 0.370 (L)   (L): Data is abnormally low

## 2024-02-29 NOTE — ASSESSMENT & PLAN NOTE
Pt reports 40 lbs wt gain in the last yr. Reports eating more and having seasonal depression.   Last TSH in June 2023   Drysol Counseling:  I discussed with the patient the risks of drysol/aluminum chloride including but not limited to skin rash, itching, irritation, burning.

## 2024-02-29 NOTE — PROGRESS NOTES
Virtual Visit: Established Patient   This visit was conducted via Zoom using secure and encrypted videoconferencing technology.   The patient was in their home in the [facility].    The patient's identity was confirmed and verbal consent was obtained for this virtual visit.   This evaluation was conducted via Zoom using secure and encrypted videoconferencing technology. The patient was in their home in the [facility].    The patient's identity was confirmed and verbal consent was obtained for this virtual visit.     Subjective:   CC:   Chief Complaint   Patient presents with    Medication Follow-up       Leigh Whitley is a 53 y.o. female presenting for evaluation and management of:    Hypothyroidism  Chronic condition. Taking Lexothyroxine 88mcg. Last TSH from 6/2023   Latest Reference Range & Units 05/18/22 05:56 06/02/23 09:38   TSH 0.380 - 5.330 uIU/mL 0.617 0.370 (L)   (L): Data is abnormally low    Weight gain  Pt reports 40 lbs wt gain in the last yr. Reports eating more and having seasonal depression.   Last TSH in June 2023    Anxiety and depression  Chronic problem. Admits to seasonal depression, denies SI. Taking Celexa 20 mg. Seeing a therapist, needs to re-schedule.    Pain of right hip  New problem. Right hip pain, fell last yr, pain went away and returned in September.  Also having pain in left ankle, following with [facility]rist ([facility] who is recommending surgery.   Planning ankle surgery in September.     Ankle pain, left  New problem. having pain in left ankle, following with [facility]iatrist ([facility]) who is recommending surgery.   Planning ankle surgery in September.     ROS     Current medicines (including changes today)  Current Outpatient Medications   Medication Sig Dispense Refill    levothyroxine (SYNTHROID) 88 MCG Tab Take 1 Tablet by mouth every morning on an empty stomach. 30 Tablet 0    citalopram (CELEXA) 20 MG Tab Take 2 Tablets by mouth every day.  "180 Tablet 0    docusate sodium (COLACE) 100 MG Cap Take 1 capsule by mouth daily at 6 pm. 30 Capsule 0    vitamin D2, Ergocalciferol, (DRISDOL) 1.25 MG (87349 UT) Cap capsule Take 1 Capsule by mouth every 7 days. 12 Capsule 3    Non Formulary Request Take 1 Capsule by mouth every day. [employer] Mushroom Capsules      therapeutic multivitamin-minerals (THERAGRAN-M) Tab Take 1 Tablet by mouth every day.      COLLAGEN PO Take 1 Capsule by mouth every day.      brimonidine (ALPHAGAN) 0.2 % Solution Administer 1 Drop into the right eye 2 times a day.       No current facility-administered medications for this visit.       Patient Active Problem List    Diagnosis Date Noted    Weight gain 02/29/2024    Pain of right hip 02/29/2024    Ankle pain, left 02/29/2024    Constipation 06/07/2023    Bone cyst of left ankle 05/31/2023    S/P bariatric surgery 04/27/2023    Weakness 04/27/2023    Obstructive sleep apnea syndrome 11/09/2022    Snoring 09/09/2022    Vitamin D deficiency 09/09/2022    Abnormal sensation of eye 08/31/2022    Onychocryptosis 05/27/2022    Mixed hyperlipidemia 07/01/2021    History of stroke 05/14/2021    Panic attacks 05/14/2021    Severe episode of recurrent major depressive disorder, without psychotic features ([facility]) 01/29/2020    ADD (attention deficit disorder) without hyperactivity 02/09/2018    Status post bariatric surgery 02/09/2018    GERD (gastroesophageal reflux disease) 02/09/2018    Environmental allergies 08/30/2017    Obesity (BMI 30-39.9) 08/30/2017    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  Skin: No rashes in visible areas.  Eye: Round. Conjunctiva clear, lids normal. No icterus.   ENMT: Lips pink without " lesions, good dentition, moist mucous membranes. Phonation normal.  Neck: No masses, no thyromegaly. Moves freely without pain.  Respiratory: Unlabored respiratory effort, no cough or audible wheeze  Psych: Alert and oriented x3, normal affect and mood.     Assessment and Plan:   The following treatment plan was discussed:   1. PE (physical exam), annual  - CBC WITHOUT DIFFERENTIAL; Future  - Comp Metabolic Panel; Future  - Lipid Profile; Future  - TSH; Future  - T3 FREE; Future  - FREE THYROXINE; Future  - HEMOGLOBIN A1C; Future    2. Hypothyroidism due to acquired atrophy of thyroid  - TSH; Future  - T3 FREE; Future  - FREE THYROXINE; Future  - levothyroxine (SYNTHROID) 88 MCG Tab; Take 1 Tablet by mouth every morning on an empty stomach.  Dispense: 30 Tablet; Refill: 0    3. Weight gain    4. Anxiety and depression  Uncontrolled, stable.  Patient will be started therapy, she already has a therapist and does not need a new referral today.  Trial of increasing citalopram to 40 mg.  Will follow-up next visit  - citalopram (CELEXA) 20 MG Tab; Take 2 Tablets by mouth every day.  Dispense: 180 Tablet; Refill: 0    5. Pain of right hip  - DX-HIP-BILATERAL-WITH PELVIS-5+; Future  - Referral to Orthopedics    6. Chronic pain of left ankle  - Referral to Orthopedics       Discussed with patient possible alternative diagnoses, patient is to take all medications as prescribed.      If symptoms persist FU w/PCP, if symptoms worsen go to emergency room.      If experiencing any side effects from prescribed medications report to the office immediately or go to emergency room.     Reviewed indication, dosage, usage and potential adverse effects of prescribed medications.      Reviewed risks and benefits of treatment plan. Patient verbalizes understanding of all instruction and verbally agrees to plan.     Discussed plan with the patient, and patient agrees to the above.      I personally reviewed prior external notes and test  results pertinent to today's visit.     Follow-up: 2 wks

## 2024-02-29 NOTE — ASSESSMENT & PLAN NOTE
New problem. Right hip pain, fell last yr, pain went away and returned in September.  Also having pain in left ankle, following with [facility]iatrist ([facility]) who is recommending surgery.   Planning ankle surgery in September.

## 2024-02-29 NOTE — ASSESSMENT & PLAN NOTE
New problem. having pain in left ankle, following with podiatrist ([facility]) who is recommending surgery.   Planning ankle surgery in September.

## 2024-03-06 ENCOUNTER — HOSPITAL ENCOUNTER (OUTPATIENT)
Dept: LAB | Facility: MEDICAL CENTER | Age: 54
End: 2024-03-06
Attending: NURSE PRACTITIONER
Payer: COMMERCIAL

## 2024-03-06 DIAGNOSIS — Z98.84 S/P BARIATRIC SURGERY: ICD-10-CM

## 2024-03-06 DIAGNOSIS — Z00.00 PE (PHYSICAL EXAM), ANNUAL: ICD-10-CM

## 2024-03-06 DIAGNOSIS — K59.00 CONSTIPATION, UNSPECIFIED CONSTIPATION TYPE: ICD-10-CM

## 2024-03-06 LAB
ALBUMIN SERPL BCP-MCNC: 4.3 G/DL (ref 3.2–4.9)
ALBUMIN/GLOB SERPL: 1.5 G/DL
ALP SERPL-CCNC: 72 U/L (ref 30–99)
ALT SERPL-CCNC: 24 U/L (ref 2–50)
ANION GAP SERPL CALC-SCNC: 13 MMOL/L (ref 7–16)
AST SERPL-CCNC: 27 U/L (ref 12–45)
BILIRUB SERPL-MCNC: 0.6 MG/DL (ref 0.1–1.5)
BUN SERPL-MCNC: 16 MG/DL (ref 8–22)
CALCIUM ALBUM COR SERPL-MCNC: 8.9 MG/DL (ref 8.5–10.5)
CALCIUM SERPL-MCNC: 9.1 MG/DL (ref 8.5–10.5)
CHLORIDE SERPL-SCNC: 107 MMOL/L (ref 96–112)
CHOLEST SERPL-MCNC: 232 MG/DL (ref 100–199)
CO2 SERPL-SCNC: 21 MMOL/L (ref 20–33)
CREAT SERPL-MCNC: 0.78 MG/DL (ref 0.5–1.4)
ERYTHROCYTE [DISTWIDTH] IN BLOOD BY AUTOMATED COUNT: 41.4 FL (ref 35.9–50)
EST. AVERAGE GLUCOSE BLD GHB EST-MCNC: 97 MG/DL
FASTING STATUS PATIENT QL REPORTED: NORMAL
GFR SERPLBLD CREATININE-BSD FMLA CKD-EPI: 91 ML/MIN/1.73 M 2
GLOBULIN SER CALC-MCNC: 2.8 G/DL (ref 1.9–3.5)
GLUCOSE SERPL-MCNC: 98 MG/DL (ref 65–99)
HBA1C MFR BLD: 5 % (ref 4–5.6)
HCT VFR BLD AUTO: 41.3 % (ref 37–47)
HDLC SERPL-MCNC: 37 MG/DL
HGB BLD-MCNC: 14.2 G/DL (ref 12–16)
LDLC SERPL CALC-MCNC: 160 MG/DL
MCH RBC QN AUTO: 32.3 PG (ref 27–33)
MCHC RBC AUTO-ENTMCNC: 34.4 G/DL (ref 32.2–35.5)
MCV RBC AUTO: 94.1 FL (ref 81.4–97.8)
PLATELET # BLD AUTO: 249 K/UL (ref 164–446)
PMV BLD AUTO: 10.5 FL (ref 9–12.9)
POTASSIUM SERPL-SCNC: 4 MMOL/L (ref 3.6–5.5)
PROT SERPL-MCNC: 7.1 G/DL (ref 6–8.2)
RBC # BLD AUTO: 4.39 M/UL (ref 4.2–5.4)
SODIUM SERPL-SCNC: 141 MMOL/L (ref 135–145)
T3FREE SERPL-MCNC: 3.16 PG/ML (ref 2–4.4)
T4 FREE SERPL-MCNC: 1.02 NG/DL (ref 0.93–1.7)
TRIGL SERPL-MCNC: 173 MG/DL (ref 0–149)
TSH SERPL DL<=0.005 MIU/L-ACNC: 1.55 UIU/ML (ref 0.38–5.33)
VIT B12 SERPL-MCNC: 989 PG/ML (ref 211–911)
WBC # BLD AUTO: 5.1 K/UL (ref 4.8–10.8)

## 2024-03-06 PROCEDURE — 84443 ASSAY THYROID STIM HORMONE: CPT

## 2024-03-06 PROCEDURE — 84481 FREE ASSAY (FT-3): CPT

## 2024-03-06 PROCEDURE — 36415 COLL VENOUS BLD VENIPUNCTURE: CPT

## 2024-03-06 PROCEDURE — 84439 ASSAY OF FREE THYROXINE: CPT

## 2024-03-06 PROCEDURE — 83036 HEMOGLOBIN GLYCOSYLATED A1C: CPT

## 2024-03-06 PROCEDURE — 85027 COMPLETE CBC AUTOMATED: CPT

## 2024-03-06 PROCEDURE — 80061 LIPID PANEL: CPT

## 2024-03-06 PROCEDURE — 80053 COMPREHEN METABOLIC PANEL: CPT

## 2024-03-06 PROCEDURE — 82607 VITAMIN B-12: CPT

## 2024-03-06 RX ORDER — DOCUSATE SODIUM 100 MG/1
CAPSULE, LIQUID FILLED ORAL
Qty: 30 CAPSULE | Refills: 0 | Status: SHIPPED | OUTPATIENT
Start: 2024-03-06

## 2024-03-14 ENCOUNTER — APPOINTMENT (OUTPATIENT)
Dept: MEDICAL GROUP | Facility: MEDICAL CENTER | Age: 54
End: 2024-03-14
Payer: COMMERCIAL

## 2024-04-01 DIAGNOSIS — E55.9 VITAMIN D DEFICIENCY: ICD-10-CM

## 2024-04-02 DIAGNOSIS — K59.00 CONSTIPATION, UNSPECIFIED CONSTIPATION TYPE: ICD-10-CM

## 2024-04-02 RX ORDER — ERGOCALCIFEROL 1.25 MG/1
50000 CAPSULE ORAL
Qty: 12 CAPSULE | Refills: 2 | Status: SHIPPED | OUTPATIENT
Start: 2024-04-02

## 2024-04-02 RX ORDER — DOCUSATE SODIUM 100 MG/1
CAPSULE, LIQUID FILLED ORAL
Qty: 30 CAPSULE | Refills: 0 | Status: SHIPPED | OUTPATIENT
Start: 2024-04-02 | End: 2024-04-30

## 2024-04-02 NOTE — TELEPHONE ENCOUNTER
Received request via: Patient    Was the patient seen in the last year in this department? Yes    Does the patient have an active prescription (recently filled or refills available) for medication(s) requested? No        Does the patient have [facility] Plus and need 100 day supply (blood pressure, diabetes and cholesterol meds only)? Patient does not have SCP

## 2024-04-02 NOTE — TELEPHONE ENCOUNTER
Received request via: Patient    Was the patient seen in the last year in this department? Yes    Does the patient have an active prescription (recently filled or refills available) for medication(s) requested? No      Does the patient have nursing home Plus and need 100 day supply (blood pressure, diabetes and cholesterol meds only)? Patient does not have SCP

## 2024-04-28 DIAGNOSIS — E03.4 HYPOTHYROIDISM DUE TO ACQUIRED ATROPHY OF THYROID: ICD-10-CM

## 2024-04-28 DIAGNOSIS — K59.00 CONSTIPATION, UNSPECIFIED CONSTIPATION TYPE: ICD-10-CM

## 2024-04-29 NOTE — TELEPHONE ENCOUNTER
Received request via: Pharmacy    Was the patient seen in the last year in this department? Yes    Does the patient have an active prescription (recently filled or refills available) for medication(s) requested? No    [facility]    Does the patient have nursing home Plus and need 100 day supply (blood pressure, diabetes and cholesterol meds only)? Patient does not have SCP

## 2024-04-30 RX ORDER — DOCUSATE SODIUM 100 MG/1
CAPSULE, LIQUID FILLED ORAL
Qty: 30 CAPSULE | Refills: 0 | Status: SHIPPED | OUTPATIENT
Start: 2024-04-30

## 2024-04-30 RX ORDER — LEVOTHYROXINE SODIUM 88 UG/1
88 TABLET ORAL
Qty: 30 TABLET | Refills: 0 | Status: SHIPPED | OUTPATIENT
Start: 2024-04-30

## 2024-05-27 DIAGNOSIS — E03.4 HYPOTHYROIDISM DUE TO ACQUIRED ATROPHY OF THYROID: ICD-10-CM

## 2024-05-27 DIAGNOSIS — K59.00 CONSTIPATION, UNSPECIFIED CONSTIPATION TYPE: ICD-10-CM

## 2024-05-28 RX ORDER — LEVOTHYROXINE SODIUM 88 UG/1
88 TABLET ORAL
Qty: 30 TABLET | Refills: 0 | Status: SHIPPED | OUTPATIENT
Start: 2024-05-28

## 2024-05-28 RX ORDER — DOCUSATE SODIUM 100 MG/1
CAPSULE, LIQUID FILLED ORAL
Qty: 30 CAPSULE | Refills: 0 | Status: SHIPPED | OUTPATIENT
Start: 2024-05-28

## 2024-05-28 NOTE — TELEPHONE ENCOUNTER
Received request via: Pharmacy    Was the patient seen in the last year in this department? Yes    Does the patient have an active prescription (recently filled or refills available) for medication(s) requested? No    Ph[facility]    Does the patient have long term Plus and need 100 day supply (blood pressure, diabetes and cholesterol meds only)? Patient does not have SCP

## 2024-06-11 ENCOUNTER — APPOINTMENT (OUTPATIENT)
Dept: MEDICAL GROUP | Facility: MEDICAL CENTER | Age: 54
End: 2024-06-11
Payer: COMMERCIAL

## 2024-06-11 DIAGNOSIS — F41.9 ANXIETY AND DEPRESSION: ICD-10-CM

## 2024-06-11 DIAGNOSIS — F32.A ANXIETY AND DEPRESSION: ICD-10-CM

## 2024-06-11 RX ORDER — CITALOPRAM 20 MG/1
40 TABLET ORAL DAILY
Qty: 180 TABLET | Refills: 0 | Status: SHIPPED | OUTPATIENT
Start: 2024-06-11

## 2024-08-01 ENCOUNTER — APPOINTMENT (OUTPATIENT)
Dept: MEDICAL GROUP | Facility: MEDICAL CENTER | Age: 54
End: 2024-08-01
Payer: COMMERCIAL

## 2024-08-01 VITALS
DIASTOLIC BLOOD PRESSURE: 78 MMHG | HEIGHT: 66 IN | BODY MASS INDEX: 34.63 KG/M2 | SYSTOLIC BLOOD PRESSURE: 116 MMHG | WEIGHT: 215.5 LBS | TEMPERATURE: 97.8 F | RESPIRATION RATE: 16 BRPM | OXYGEN SATURATION: 94 % | HEART RATE: 98 BPM

## 2024-08-01 DIAGNOSIS — Z11.59 NEED FOR HEPATITIS C SCREENING TEST: ICD-10-CM

## 2024-08-01 DIAGNOSIS — F41.9 ANXIETY AND DEPRESSION: ICD-10-CM

## 2024-08-01 DIAGNOSIS — E78.2 MIXED HYPERLIPIDEMIA: ICD-10-CM

## 2024-08-01 DIAGNOSIS — F32.A ANXIETY AND DEPRESSION: ICD-10-CM

## 2024-08-01 DIAGNOSIS — E03.4 HYPOTHYROIDISM DUE TO ACQUIRED ATROPHY OF THYROID: ICD-10-CM

## 2024-08-01 DIAGNOSIS — K59.00 CONSTIPATION, UNSPECIFIED CONSTIPATION TYPE: ICD-10-CM

## 2024-08-01 DIAGNOSIS — Z11.4 SCREENING FOR HIV (HUMAN IMMUNODEFICIENCY VIRUS): ICD-10-CM

## 2024-08-01 PROCEDURE — 3074F SYST BP LT 130 MM HG: CPT | Performed by: NURSE PRACTITIONER

## 2024-08-01 PROCEDURE — 99214 OFFICE O/P EST MOD 30 MIN: CPT | Performed by: NURSE PRACTITIONER

## 2024-08-01 PROCEDURE — 3078F DIAST BP <80 MM HG: CPT | Performed by: NURSE PRACTITIONER

## 2024-08-01 RX ORDER — DOCUSATE SODIUM 100 MG/1
100 CAPSULE, LIQUID FILLED ORAL 2 TIMES DAILY
Qty: 60 CAPSULE | Refills: 1 | Status: SHIPPED | OUTPATIENT
Start: 2024-08-01

## 2024-08-01 RX ORDER — CITALOPRAM HYDROBROMIDE 40 MG/1
40 TABLET ORAL DAILY
Qty: 90 TABLET | Refills: 1 | Status: SHIPPED | OUTPATIENT
Start: 2024-08-01

## 2024-08-01 ASSESSMENT — ENCOUNTER SYMPTOMS
CONSTIPATION: 1
CHILLS: 0
PALPITATIONS: 0
FEVER: 0

## 2024-08-01 ASSESSMENT — FIBROSIS 4 INDEX: FIB4 SCORE: 1.17

## 2024-08-01 NOTE — PROGRESS NOTES
Patient agreed to use of MATY: yes  Chief Complaint   Patient presents with    Follow-Up       HISTORY OF PRESENT ILLNESS: Patient is a pleasant 54 y.o. female, established patient who presents today to discuss medical problems as listed below:    Assessment/Plan:    Leigh was seen today for follow-up.    Diagnoses and all orders for this visit:    Anxiety and depression  -     citalopram (CELEXA) 40 MG Tab; Take 1 Tablet by mouth every day.    Hypothyroidism due to acquired atrophy of thyroid  -     TSH; Future  -     T3 FREE; Future  -     FREE THYROXINE; Future    Constipation, unspecified constipation type  -     docusate sodium (COLACE) 100 MG Cap; Take 1 Capsule by mouth 2 times a day.    Mixed hyperlipidemia  -     Lipid Profile; Future              Assessment & Plan  1. Anxiety and depression.  This is a chronic and stable problem. It has been well controlled on 40 mg. She tried to get off this medication, but did not tolerate it, noticed mood fluctuations. She would like a refill of Celexa today.    2. Hypothyroidism.  This is chronic and stable problem. She is currently on levothyroxine 88 mcg and compliant. I will recheck labs.    3. Constipation.  This is a chronic problem. She already takes magnesium. I will trial Colace. I will recheck her thyroid.    4. Mixed hyperlipidemia.  This is a chronic problem. I will check her labs again and follow up next visit.    Follow-up  The patient will follow up in 2 months.    History of Present Illness  The patient presents for evaluation of multiple medical concerns.    She reports an improvement in her condition. However, she admits to not taking her Celexa consistently during her recent trip to [facility], but resumed it after 6 weeks. Currently, she is on a 40 mg dose and is requesting a prescription. She acknowledges that she misplaced her medication during a trip to [facility]. Despite this, she notes an improvement in her emotional state. She denies feelings  "of depression.    She has been managing her Lyme disease with a diet low in sugars and high protein. She attends red light therapy and receives lymphatic massages from [facility] in [place]. She has not been diagnosed with lymphedema, but was informed that sugars and salty foods may exacerbate her Lyme disease. She experienced an adverse reaction last weekend, characterized by feeling cold and sensitivity to touch.    She has been managing her thyroid condition well for the past 3 to 4 days, although she admits to missing a few days prior to that.    She has been dealing with constipation since childhood. She consumes between 80 and 120 ounces of water daily. She does not have Colace.    She is concerned about her cholesterol levels.    Health Maintenance:  COMPLETED     Allergies, past medical history, past surgical history, family history, social history reviewed and updated.    Review of Systems   Constitutional:  Negative for chills, fever and malaise/fatigue.   Cardiovascular:  Negative for chest pain, palpitations and leg swelling.   Gastrointestinal:  Positive for constipation.        Exam:    /78 (BP Location: Left arm, Patient Position: Sitting, BP Cuff Size: Large adult)   Pulse 98   Temp 36.6 °C (97.8 °F) (Temporal)   Resp 16   Ht 1.676 m (5' 6\")   Wt 97.8 kg (215 lb 8 oz)   SpO2 94%   BMI 34.78 kg/m²  Body mass index is 34.78 kg/m².    Physical Exam  Constitutional:       General: She is not in acute distress.     Appearance: She is not ill-appearing.   HENT:      Head: Normocephalic and atraumatic.      Nose: Nose normal.   Eyes:      General:         Right eye: No discharge.         Left eye: No discharge.   Cardiovascular:      Rate and Rhythm: Normal rate.      Pulses: Normal pulses.      Heart sounds: Normal heart sounds. No murmur heard.  Pulmonary:      Effort: Pulmonary effort is normal. No respiratory distress.      Breath sounds: Normal breath sounds. No stridor. No wheezing or " rales.   Skin:     Findings: No rash.   Neurological:      General: No focal deficit present.      Mental Status: She is alert. Mental status is at baseline.   Psychiatric:         Mood and Affect: Mood normal.         Behavior: Behavior normal.          Results  Laboratory Studies  Vitamin D was high. Cholesterol was in the high 200s.       Discussed with patient possible alternative diagnoses, patient is to take all medications as prescribed.      If symptoms persist FU w/PCP, if symptoms worsen go to emergency room.      If experiencing any side effects from prescribed medications report to the office immediately or go to emergency room.     Reviewed indication, dosage, usage and potential adverse effects of prescribed medications.      Reviewed risks and benefits of treatment plan. Patient verbalizes understanding of all instruction and verbally agrees to plan.     Discussed plan with the patient, and patient agrees to the above.      I personally reviewed prior external notes and test results pertinent to today's visit.    No follow-ups on file. October

## 2024-09-03 DIAGNOSIS — K59.00 CONSTIPATION, UNSPECIFIED CONSTIPATION TYPE: ICD-10-CM

## 2024-09-03 RX ORDER — DOCUSATE SODIUM 100 MG/1
100 CAPSULE, LIQUID FILLED ORAL 2 TIMES DAILY
Qty: 60 CAPSULE | Refills: 0 | Status: SHIPPED | OUTPATIENT
Start: 2024-09-03

## 2024-09-13 DIAGNOSIS — F32.A ANXIETY AND DEPRESSION: ICD-10-CM

## 2024-09-13 DIAGNOSIS — F41.9 ANXIETY AND DEPRESSION: ICD-10-CM

## 2024-09-16 NOTE — TELEPHONE ENCOUNTER
Received request via: Pharmacy    Was the patient seen in the last year in this department? Yes    Does the patient have an active prescription (recently filled or refills available) for medication(s) requested? No    [email or web address]    Does the patient have [facility] Plus and need 100-day supply? (This applies to ALL medications) Patient does not have SCP

## 2024-09-17 DIAGNOSIS — F32.A ANXIETY AND DEPRESSION: ICD-10-CM

## 2024-09-17 DIAGNOSIS — F41.9 ANXIETY AND DEPRESSION: ICD-10-CM

## 2024-09-17 RX ORDER — CITALOPRAM HYDROBROMIDE 20 MG/1
40 TABLET ORAL DAILY
Qty: 180 TABLET | Refills: 0 | Status: SHIPPED | OUTPATIENT
Start: 2024-09-17 | End: 2024-09-18

## 2024-09-18 RX ORDER — CITALOPRAM HYDROBROMIDE 20 MG/1
40 TABLET ORAL DAILY
Qty: 180 TABLET | Refills: 0 | Status: SHIPPED | OUTPATIENT
Start: 2024-09-18

## 2024-09-18 NOTE — TELEPHONE ENCOUNTER
Pharmacy comment: Does this 20mg tablet prescription  replace 40mg tablet prescription on file?

## 2024-09-30 DIAGNOSIS — K59.00 CONSTIPATION, UNSPECIFIED CONSTIPATION TYPE: ICD-10-CM

## 2024-10-01 RX ORDER — DOCUSATE SODIUM 100 MG/1
100 CAPSULE, LIQUID FILLED ORAL 2 TIMES DAILY
Qty: 60 CAPSULE | Refills: 0 | Status: SHIPPED | OUTPATIENT
Start: 2024-10-01 | End: 2024-10-29

## 2024-10-26 DIAGNOSIS — K59.00 CONSTIPATION, UNSPECIFIED CONSTIPATION TYPE: ICD-10-CM

## 2024-10-29 RX ORDER — DOCUSATE SODIUM 100 MG/1
100 CAPSULE, LIQUID FILLED ORAL 2 TIMES DAILY
Qty: 60 CAPSULE | Refills: 0 | Status: SHIPPED | OUTPATIENT
Start: 2024-10-29

## 2024-11-25 DIAGNOSIS — K59.00 CONSTIPATION, UNSPECIFIED CONSTIPATION TYPE: ICD-10-CM

## 2024-11-25 RX ORDER — DOCUSATE SODIUM 100 MG/1
100 CAPSULE, LIQUID FILLED ORAL 2 TIMES DAILY
Qty: 60 CAPSULE | Refills: 0 | Status: SHIPPED | OUTPATIENT
Start: 2024-11-25

## 2024-11-25 NOTE — TELEPHONE ENCOUNTER
Received request via: Pharmacy    Was the patient seen in the last year in this department? Yes    Does the patient have an active prescription (recently filled or refills available) for medication(s) requested? No    Pharmacy Name: [facility]    Does the patient have [facility] Plus and need 100-day supply? (This applies to ALL medications) Patient does not have SCP

## 2024-12-25 DIAGNOSIS — K59.00 CONSTIPATION, UNSPECIFIED CONSTIPATION TYPE: ICD-10-CM

## 2024-12-26 RX ORDER — DOCUSATE SODIUM 100 MG/1
100 CAPSULE, LIQUID FILLED ORAL 2 TIMES DAILY
Qty: 60 CAPSULE | Refills: 0 | Status: SHIPPED | OUTPATIENT
Start: 2024-12-26

## 2025-01-13 DIAGNOSIS — E03.4 HYPOTHYROIDISM DUE TO ACQUIRED ATROPHY OF THYROID: ICD-10-CM

## 2025-01-14 RX ORDER — LEVOTHYROXINE SODIUM 88 UG/1
88 TABLET ORAL
Qty: 30 TABLET | Refills: 0 | Status: SHIPPED | OUTPATIENT
Start: 2025-01-14

## 2025-02-04 ENCOUNTER — APPOINTMENT (OUTPATIENT)
Dept: URBAN - METROPOLITAN AREA CLINIC 31 | Facility: CLINIC | Age: 55
Setting detail: DERMATOLOGY
End: 2025-02-04

## 2025-02-04 DIAGNOSIS — L57.8 OTHER SKIN CHANGES DUE TO CHRONIC EXPOSURE TO NONIONIZING RADIATION: ICD-10-CM

## 2025-02-04 DIAGNOSIS — L81.4 OTHER MELANIN HYPERPIGMENTATION: ICD-10-CM

## 2025-02-04 DIAGNOSIS — L259 CONTACT DERMATITIS AND OTHER ECZEMA, UNSPECIFIED CAUSE: ICD-10-CM

## 2025-02-04 DIAGNOSIS — L57.0 ACTINIC KERATOSIS: ICD-10-CM

## 2025-02-04 DIAGNOSIS — L82.1 OTHER SEBORRHEIC KERATOSIS: ICD-10-CM

## 2025-02-04 DIAGNOSIS — D18.0 HEMANGIOMA: ICD-10-CM

## 2025-02-04 DIAGNOSIS — L73.9 FOLLICULAR DISORDER, UNSPECIFIED: ICD-10-CM

## 2025-02-04 PROBLEM — D18.01 HEMANGIOMA OF SKIN AND SUBCUTANEOUS TISSUE: Status: ACTIVE | Noted: 2025-02-04

## 2025-02-04 PROBLEM — L02.92 FURUNCLE, UNSPECIFIED: Status: ACTIVE | Noted: 2025-02-04

## 2025-02-04 PROBLEM — L30.8 OTHER SPECIFIED DERMATITIS: Status: ACTIVE | Noted: 2025-02-04

## 2025-02-04 PROCEDURE — ? ADDITIONAL NOTES

## 2025-02-04 PROCEDURE — ? PRESCRIPTION

## 2025-02-04 PROCEDURE — 17000 DESTRUCT PREMALG LESION: CPT

## 2025-02-04 PROCEDURE — 99213 OFFICE O/P EST LOW 20 MIN: CPT | Mod: 25

## 2025-02-04 PROCEDURE — ? COUNSELING

## 2025-02-04 PROCEDURE — ? MEDICATION COUNSELING

## 2025-02-04 PROCEDURE — ? LIQUID NITROGEN

## 2025-02-04 PROCEDURE — ? SUNSCREEN RECOMMENDATIONS

## 2025-02-04 RX ORDER — CLINDAMYCIN PHOSPHATE 10 MG/G
GEL TOPICAL
Qty: 60 | Refills: 3 | Status: ERX | COMMUNITY
Start: 2025-02-04

## 2025-02-04 RX ADMIN — CLINDAMYCIN PHOSPHATE: 10 GEL TOPICAL at 00:00

## 2025-02-04 ASSESSMENT — LOCATION ZONE DERM
LOCATION ZONE: TRUNK
LOCATION ZONE: LIP

## 2025-02-04 ASSESSMENT — LOCATION SIMPLE DESCRIPTION DERM
LOCATION SIMPLE: RIGHT LIP
LOCATION SIMPLE: LEFT UPPER BACK

## 2025-02-04 ASSESSMENT — LOCATION DETAILED DESCRIPTION DERM
LOCATION DETAILED: LEFT INFERIOR UPPER BACK
LOCATION DETAILED: RIGHT UPPER CUTANEOUS LIP

## 2025-02-04 NOTE — PROCEDURE: ADDITIONAL NOTES
Additional Notes: Patient declines rx at this time
Detail Level: Simple
Render Risk Assessment In Note?: no

## 2025-02-04 NOTE — PROCEDURE: LIQUID NITROGEN
Render Note In Bullet Format When Appropriate: No
Detail Level: Detailed
Show Applicator Variable?: Yes
Post-Care Instructions: I reviewed with the patient in detail post-care instructions. Patient is to wear sunprotection, and avoid picking at any of the treated lesions. Pt may apply Vaseline to crusted or scabbing areas.
Duration Of Freeze Thaw-Cycle (Seconds): 3
Application Tool (Optional): Cry-AC
Number Of Freeze-Thaw Cycles: 1 freeze-thaw cycle
Consent: The patient's consent was obtained including but not limited to risks of crusting, scabbing, blistering, scarring, darker or lighter pigmentary change, recurrence, incomplete removal and infection.

## 2025-02-04 NOTE — PROCEDURE: MEDICATION COUNSELING
Colchicine Counseling:  Patient counseled regarding adverse effects including but not limited to stomach upset (nausea, vomiting, stomach pain, or diarrhea).  Patient instructed to limit alcohol consumption while taking this medication.  Colchicine may reduce blood counts especially with prolonged use.  The patient understands that monitoring of kidney function and blood counts may be required, especially at baseline. The patient verbalized understanding of the proper use and possible adverse effects of colchicine.  All of the patient's questions and concerns were addressed.
Cosentyx Counseling:  I discussed with the patient the risks of Cosentyx including but not limited to worsening of Crohn's disease, immunosuppression, allergic reactions and infections.  The patient understands that monitoring is required including a PPD at baseline and must alert us or the primary physician if symptoms of infection or other concerning signs are noted.
Topical Ketoconazole Counseling: Patient counseled that this medication may cause skin irritation or allergic reactions.  In the event of skin irritation, the patient was advised to reduce the amount of the drug applied or use it less frequently.   The patient verbalized understanding of the proper use and possible adverse effects of ketoconazole.  All of the patient's questions and concerns were addressed.
Azelaic Acid Pregnancy And Lactation Text: This medication is considered safe during pregnancy and breast feeding.
Thalidomide Pregnancy And Lactation Text: This medication is Pregnancy Category X and is absolutely contraindicated during pregnancy. It is unknown if it is excreted in breast milk.
Cimetidine Pregnancy And Lactation Text: This medication is Pregnancy Category B and is considered safe during pregnancy. It is also excreted in breast milk and breast feeding isn't recommended.
Methotrexate Pregnancy And Lactation Text: This medication is Pregnancy Category X and is known to cause fetal harm. This medication is excreted in breast milk.
Detail Level: Simple
Tazorac Counseling:  Patient advised that medication is irritating and drying.  Patient may need to apply sparingly and wash off after an hour before eventually leaving it on overnight.  The patient verbalized understanding of the proper use and possible adverse effects of tazorac.  All of the patient's questions and concerns were addressed.
Zoryve Pregnancy And Lactation Text: It is unknown if this medication can cause problems during pregnancy and breastfeeding.
Birth Control Pills Pregnancy And Lactation Text: This medication should be avoided if pregnant and for the first 30 days post-partum.
Tazorac Pregnancy And Lactation Text: This medication is not safe during pregnancy. It is unknown if this medication is excreted in breast milk.
Nsaids Pregnancy And Lactation Text: These medications are considered safe up to 30 weeks gestation. It is excreted in breast milk.
Erythromycin Counseling:  I discussed with the patient the risks of erythromycin including but not limited to GI upset, allergic reaction, drug rash, diarrhea, increase in liver enzymes, and yeast infections.
Picato Counseling:  I discussed with the patient the risks of Picato including but not limited to erythema, scaling, itching, weeping, crusting, and pain.
Spevigo Pregnancy And Lactation Text: The risk during pregnancy and breastfeeding is uncertain with this medication. This medication does cross the placenta. It is unknown if this medication is found in breast milk.
Erivedge Counseling- I discussed with the patient the risks of Erivedge including but not limited to nausea, vomiting, diarrhea, constipation, weight loss, changes in the sense of taste, decreased appetite, muscle spasms, and hair loss.  The patient verbalized understanding of the proper use and possible adverse effects of Erivedge.  All of the patient's questions and concerns were addressed.
Prednisone Counseling:  I discussed with the patient the risks of prolonged use of prednisone including but not limited to weight gain, insomnia, osteoporosis, mood changes, diabetes, susceptibility to infection, glaucoma and high blood pressure.  In cases where prednisone use is prolonged, patients should be monitored with blood pressure checks, serum glucose levels and an eye exam.  Additionally, the patient may need to be placed on GI prophylaxis, PCP prophylaxis, and calcium and vitamin D supplementation and/or a bisphosphonate.  The patient verbalized understanding of the proper use and the possible adverse effects of prednisone.  All of the patient's questions and concerns were addressed.
Picato Pregnancy And Lactation Text: This medication is Pregnancy Category C. It is unknown if this medication is excreted in breast milk.
Colchicine Pregnancy And Lactation Text: This medication is Pregnancy Category C and isn't considered safe during pregnancy. It is excreted in breast milk.
Oxempic Pregnancy And Lactation Text: The fetal risk of this medication is unknown and taking while pregnant is not recommended. It is unknown if this medication is present in breast milk.
Eucrisa Counseling: Patient may experience a mild burning sensation during topical application. Eucrisa is not approved in children less than 3 months of age.
Topical Ketoconazole Pregnancy And Lactation Text: This medication is Pregnancy Category B and is considered safe during pregnancy. It is unknown if it is excreted in breast milk.
Doxepin Counseling:  Patient advised that the medication is sedating and not to drive a car after taking this medication. Patient informed of potential adverse effects including but not limited to dry mouth, urinary retention, and blurry vision.  The patient verbalized understanding of the proper use and possible adverse effects of doxepin.  All of the patient's questions and concerns were addressed.
Infliximab Pregnancy And Lactation Text: This medication is Pregnancy Category B and is considered safe during pregnancy. It is unknown if this medication is excreted in breast milk.
Erythromycin Pregnancy And Lactation Text: This medication is Pregnancy Category B and is considered safe during pregnancy. It is also excreted in breast milk.
Stelara Counseling:  I discussed with the patient the risks of ustekinumab including but not limited to immunosuppression, malignancy, posterior leukoencephalopathy syndrome, and serious infections.  The patient understands that monitoring is required including a PPD at baseline and must alert us or the primary physician if symptoms of infection or other concerning signs are noted.
Eucrisa Pregnancy And Lactation Text: This medication has not been assigned a Pregnancy Risk Category but animal studies failed to show danger with the topical medication. It is unknown if the medication is excreted in breast milk.
Sotyktu Counseling:  I discussed the most common side effects of Sotyktu including: common cold, sore throat, sinus infections, cold sores, canker sores, folliculitis, and acne.  I also discussed more serious side effects of Sotyktu including but not limited to: serious allergic reactions; increased risk for infections such as TB; cancers such as lymphomas; rhabdomyolysis and elevated CPK; and elevated triglycerides and liver enzymes. 
Benzoyl Peroxide Counseling: Patient counseled that medicine may cause skin irritation and bleach clothing.  In the event of skin irritation, the patient was advised to reduce the amount of the drug applied or use it less frequently.   The patient verbalized understanding of the proper use and possible adverse effects of benzoyl peroxide.  All of the patient's questions and concerns were addressed.
Tranexamic Acid Counseling:  Patient advised of the small risk of bleeding problems with tranexamic acid. They were also instructed to call if they developed any nausea, vomiting or diarrhea. All of the patient's questions and concerns were addressed.
Saxenda Counseling: I reviewed the possible side effects including: thyroid tumors, kidney disease, gallbladder disease, abdominal pain, constipation, diarrhea, nausea, vomiting and pancreatitis. Do not take this medication if you have a history or family history of multiple endocrine neoplasia syndrome type 2. Side effects reviewed, pt to contact office should one occur.
Sotyktu Pregnancy And Lactation Text: There is insufficient data to evaluate whether or not Sotyktu is safe to use during pregnancy.   It is not known if Sotyktu passes into breast milk and whether or not it is safe to use when breastfeeding.  
Dapsone Counseling: I discussed with the patient the risks of dapsone including but not limited to hemolytic anemia, agranulocytosis, rashes, methemoglobinemia, kidney failure, peripheral neuropathy, headaches, GI upset, and liver toxicity.  Patients who start dapsone require monitoring including baseline LFTs and weekly CBCs for the first month, then every month thereafter.  The patient verbalized understanding of the proper use and possible adverse effects of dapsone.  All of the patient's questions and concerns were addressed.
Nemluvio Counseling: I discussed with the patient the risks of nemolizumab including but not limited to headache, gastrointestinal complaints, nasopharyngitis, musculoskeletal complaints, injection site reactions, and allergic reactions. The patient understands that monitoring is required and they must alert us or the primary physician if any side effects are noted.
Include Pregnancy/Lactation Warning?: No
Zyclara Counseling:  I discussed with the patient the risks of imiquimod including but not limited to erythema, scaling, itching, weeping, crusting, and pain.  Patient understands that the inflammatory response to imiquimod is variable from person to person and was educated regarded proper titration schedule.  If flu-like symptoms develop, patient knows to discontinue the medication and contact us.
Doxepin Pregnancy And Lactation Text: This medication is Pregnancy Category C and it isn't known if it is safe during pregnancy. It is also excreted in breast milk and breast feeding isn't recommended.
Fluconazole Counseling:  Patient counseled regarding adverse effects of fluconazole including but not limited to headache, diarrhea, nausea, upset stomach, liver function test abnormalities, taste disturbance, and stomach pain.  There is a rare possibility of liver failure that can occur when taking fluconazole.  The patient understands that monitoring of LFTs and kidney function test may be required, especially at baseline. The patient verbalized understanding of the proper use and possible adverse effects of fluconazole.  All of the patient's questions and concerns were addressed.
Spironolactone Counseling: Patient advised regarding risks of diarrhea, abdominal pain, hyperkalemia, birth defects (for female patients), liver toxicity and renal toxicity. The patient may need blood work to monitor liver and kidney function and potassium levels while on therapy. The patient verbalized understanding of the proper use and possible adverse effects of spironolactone.  All of the patient's questions and concerns were addressed.
Olanzapine Counseling- I discussed with the patient the common side effects of olanzapine including but are not limited to: lack of energy, dry mouth, increased appetite, sleepiness, tremor, constipation, dizziness, changes in behavior, or restlessness.  Explained that teenagers are more likely to experience headaches, abdominal pain, pain in the arms or legs, tiredness, and sleepiness.  Serious side effects include but are not limited: increased risk of death in elderly patients who are confused, have memory loss, or dementia-related psychosis; hyperglycemia; increased cholesterol and triglycerides; and weight gain.
Dupixent Counseling: I discussed with the patient the risks of dupilumab including but not limited to eye inflammation and irritation, cold sores, injection site reactions, allergic reactions and increased risk of parasitic infection. The patient understands that monitoring is required and they must alert us or the primary physician if symptoms of infection or other concerning signs are noted.
Tranexamic Acid Pregnancy And Lactation Text: It is unknown if this medication is safe during pregnancy or breast feeding.
Topical Metronidazole Counseling: Metronidazole is a topical antibiotic medication. You may experience burning, stinging, redness, or allergic reactions.  Please call our office if you develop any problems from using this medication.
Prednisone Pregnancy And Lactation Text: This medication is Pregnancy Category C and it isn't know if it is safe during pregnancy. This medication is excreted in breast milk.
Benzoyl Peroxide Pregnancy And Lactation Text: This medication is Pregnancy Category C. It is unknown if benzoyl peroxide is excreted in breast milk.
Protopic Counseling: Patient may experience a mild burning sensation during topical application. Protopic is not approved in children less than 2 years of age. There have been case reports of hematologic and skin malignancies in patients using topical calcineurin inhibitors although causality is questionable.
Hydroxyzine Counseling: Patient advised that the medication is sedating and not to drive a car after taking this medication.  Patient informed of potential adverse effects including but not limited to dry mouth, urinary retention, and blurry vision.  The patient verbalized understanding of the proper use and possible adverse effects of hydroxyzine.  All of the patient's questions and concerns were addressed.
Fluconazole Pregnancy And Lactation Text: This medication is Pregnancy Category C and it isn't know if it is safe during pregnancy. It is also excreted in breast milk.
Topical Metronidazole Pregnancy And Lactation Text: This medication is Pregnancy Category B and considered safe during pregnancy.  It is also considered safe to use while breastfeeding.
Spironolactone Pregnancy And Lactation Text: This medication can cause feminization of the male fetus and should be avoided during pregnancy. The active metabolite is also found in breast milk.
Olanzapine Pregnancy And Lactation Text: This medication is pregnancy category C.   There are no adequate and well controlled trials with olanzapine in pregnant females.  Olanzapine should be used during pregnancy only if the potential benefit justifies the potential risk to the fetus.   In a study in lactating healthy women, olanzapine was excreted in breast milk.  It is recommended that women taking olanzapine should not breast feed.
Metronidazole Counseling:  I discussed with the patient the risks of metronidazole including but not limited to seizures, nausea/vomiting, a metallic taste in the mouth, nausea/vomiting and severe allergy.
Libtayo Counseling- I discussed with the patient the risks of Libtayo including but not limited to nausea, vomiting, diarrhea, and bone or muscle pain.  The patient verbalized understanding of the proper use and possible adverse effects of Libtayo.  All of the patient's questions and concerns were addressed.
Hydroquinone Counseling:  Patient advised that medication may result in skin irritation, lightening (hypopigmentation), dryness, and burning.  In the event of skin irritation, the patient was advised to reduce the amount of the drug applied or use it less frequently.  Rarely, spots that are treated with hydroquinone can become darker (pseudoochronosis).  Should this occur, patient instructed to stop medication and call the office. The patient verbalized understanding of the proper use and possible adverse effects of hydroquinone.  All of the patient's questions and concerns were addressed.
Valtrex Counseling: I discussed with the patient the risks of valacyclovir including but not limited to kidney damage, nausea, vomiting and severe allergy.  The patient understands that if the infection seems to be worsening or is not improving, they are to call.
Protopic Pregnancy And Lactation Text: This medication is Pregnancy Category C. It is unknown if this medication is excreted in breast milk when applied topically.
Libtayo Pregnancy And Lactation Text: This medication is contraindicated in pregnancy and when breast feeding.
Nemluvio Pregnancy And Lactation Text: It is not known if Nemluvio causes fetal harm or is present in breast milk. Please proceed with caution if patients who are pregnant or breastfeeding.
Dapsone Pregnancy And Lactation Text: This medication is Pregnancy Category C and is not considered safe during pregnancy or breast feeding.
Dupixent Pregnancy And Lactation Text: This medication likely crosses the placenta but the risk for the fetus is uncertain. This medication is excreted in breast milk.
Carac Counseling:  I discussed with the patient the risks of Carac including but not limited to erythema, scaling, itching, weeping, crusting, and pain.
Oral Minoxidil Counseling- I discussed with the patient the risks of oral minoxidil including but not limited to shortness of breath, swelling of the feet or ankles, dizziness, lightheadedness, unwanted hair growth and allergic reaction.  The patient verbalized understanding of the proper use and possible adverse effects of oral minoxidil.  All of the patient's questions and concerns were addressed.
Semaglutide Counseling: I reviewed the possible side effects including: thyroid tumors, kidney disease, gallbladder disease, abdominal pain, constipation, diarrhea, nausea, vomiting and pancreatitis. Do not take this medication if you have a history or family history of multiple endocrine neoplasia syndrome type 2. Side effects reviewed, pt to contact office should one occur.
Rituxan Counseling:  I discussed with the patient the risks of Rituxan infusions. Side effects can include infusion reactions, severe drug rashes including mucocutaneous reactions, reactivation of latent hepatitis and other infections and rarely progressive multifocal leukoencephalopathy.  All of the patient's questions and concerns were addressed.
Griseofulvin Counseling:  I discussed with the patient the risks of griseofulvin including but not limited to photosensitivity, cytopenia, liver damage, nausea/vomiting and severe allergy.  The patient understands that this medication is best absorbed when taken with a fatty meal (e.g., ice cream or french fries).
Metronidazole Pregnancy And Lactation Text: This medication is Pregnancy Category B and considered safe during pregnancy.  It is also excreted in breast milk.
Gabapentin Counseling: I discussed with the patient the risks of gabapentin including but not limited to dizziness, somnolence, fatigue and ataxia.
Qbrexza Counseling:  I discussed with the patient the risks of Qbrexza including but not limited to headache, mydriasis, blurred vision, dry eyes, nasal dryness, dry mouth, dry throat, dry skin, urinary hesitation, and constipation.  Local skin reactions including erythema, burning, stinging, and itching can also occur.
Carac Pregnancy And Lactation Text: This medication is Pregnancy Category X and contraindicated in pregnancy and in women who may become pregnant. It is unknown if this medication is excreted in breast milk.
Valtrex Pregnancy And Lactation Text: this medication is Pregnancy Category B and is considered safe during pregnancy. This medication is not directly found in breast milk but it's metabolite acyclovir is present.
Ebglyss Counseling: I discussed with the patient the risks of lebrikizumab including but not limited to eye inflammation and irritation, cold sores, injection site reactions, allergic reactions and increased risk of parasitic infection. The patient understands that monitoring is required and they must alert us or the primary physician if symptoms of infection or other concerning signs are noted.
Hydroxyzine Pregnancy And Lactation Text: This medication is not safe during pregnancy and should not be taken. It is also excreted in breast milk and breast feeding isn't recommended.
Topical Steroids Counseling: I discussed with the patient that prolonged use of topical steroids can result in the increased appearance of superficial blood vessels (telangiectasias), lightening (hypopigmentation) and thinning of the skin (atrophy).  Patient understands to avoid using high potency steroids in skin folds, the groin or the face.  The patient verbalized understanding of the proper use and possible adverse effects of topical steroids.  All of the patient's questions and concerns were addressed.
Imiquimod Counseling:  I discussed with the patient the risks of imiquimod including but not limited to erythema, scaling, itching, weeping, crusting, and pain.  Patient understands that the inflammatory response to imiquimod is variable from person to person and was educated regarded proper titration schedule.  If flu-like symptoms develop, patient knows to discontinue the medication and contact us.
Griseofulvin Pregnancy And Lactation Text: This medication is Pregnancy Category X and is known to cause serious birth defects. It is unknown if this medication is excreted in breast milk but breast feeding should be avoided.
Oral Minoxidil Pregnancy And Lactation Text: This medication should only be used when clearly needed if you are pregnant, attempting to become pregnant or breast feeding.
Acitretin Counseling:  I discussed with the patient the risks of acitretin including but not limited to hair loss, dry lips/skin/eyes, liver damage, hyperlipidemia, depression/suicidal ideation, photosensitivity.  Serious rare side effects can include but are not limited to pancreatitis, pseudotumor cerebri, bony changes, clot formation/stroke/heart attack.  Patient understands that alcohol is contraindicated since it can result in liver toxicity and significantly prolong the elimination of the drug by many years.
Cibinqo Counseling: I discussed with the patient the risks of Cibinqo therapy including but not limited to common cold, nausea, headache, cold sores, increased blood CPK levels, dizziness, UTIs, fatigue, acne, and vomitting. Live vaccines should be avoided.  This medication has been linked to serious infections; higher rate of mortality; malignancy and lymphoproliferative disorders; major adverse cardiovascular events; thrombosis; thrombocytopenia and lymphopenia; lipid elevations; and retinal detachment.
Minocycline Counseling: Patient advised regarding possible photosensitivity and discoloration of the teeth, skin, lips, tongue and gums.  Patient instructed to avoid sunlight, if possible.  When exposed to sunlight, patients should wear protective clothing, sunglasses, and sunscreen.  The patient was instructed to call the office immediately if the following severe adverse effects occur:  hearing changes, easy bruising/bleeding, severe headache, or vision changes.  The patient verbalized understanding of the proper use and possible adverse effects of minocycline.  All of the patient's questions and concerns were addressed.
Odomzo Counseling- I discussed with the patient the risks of Odomzo including but not limited to nausea, vomiting, diarrhea, constipation, weight loss, changes in the sense of taste, decreased appetite, muscle spasms, and hair loss.  The patient verbalized understanding of the proper use and possible adverse effects of Odomzo.  All of the patient's questions and concerns were addressed.
Use Enhanced Medication Counseling?: Yes
Topical Steroids Applications Pregnancy And Lactation Text: Most topical steroids are considered safe to use during pregnancy and lactation.  Any topical steroid applied to the breast or nipple should be washed off before breastfeeding.
Calcipotriene Counseling:  I discussed with the patient the risks of calcipotriene including but not limited to erythema, scaling, itching, and irritation.
Qbrexza Pregnancy And Lactation Text: There is no available data on Qbrexza use in pregnant women.  There is no available data on Qbrexza use in lactation.
Azithromycin Counseling:  I discussed with the patient the risks of azithromycin including but not limited to GI upset, allergic reaction, drug rash, diarrhea, and yeast infections.
Rituxan Pregnancy And Lactation Text: This medication is Pregnancy Category C and it isn't know if it is safe during pregnancy. It is unknown if this medication is excreted in breast milk but similar antibodies are known to be excreted.
Opioid Counseling: I discussed with the patient the potential side effects of opioids including but not limited to addiction, altered mental status, and depression. I stressed avoiding alcohol, benzodiazepines, muscle relaxants and sleep aids unless specifically okayed by a physician. The patient verbalized understanding of the proper use and possible adverse effects of opioids. All of the patient's questions and concerns were addressed. They were instructed to flush the remaining pills down the toilet if they did not need them for pain.
Minocycline Pregnancy And Lactation Text: This medication is Pregnancy Category D and not consider safe during pregnancy. It is also excreted in breast milk.
Otezla Counseling: The side effects of Otezla were discussed with the patient, including but not limited to worsening or new depression, weight loss, diarrhea, nausea, upper respiratory tract infection, and headache. Patient instructed to call the office should any adverse effect occur.  The patient verbalized understanding of the proper use and possible adverse effects of Otezla.  All the patient's questions and concerns were addressed.
Azathioprine Counseling:  I discussed with the patient the risks of azathioprine including but not limited to myelosuppression, immunosuppression, hepatotoxicity, lymphoma, and infections.  The patient understands that monitoring is required including baseline LFTs, Creatinine, possible TPMP genotyping and weekly CBCs for the first month and then every 2 weeks thereafter.  The patient verbalized understanding of the proper use and possible adverse effects of azathioprine.  All of the patient's questions and concerns were addressed.
Ebglyss Pregnancy And Lactation Text: This medication likely crosses the placenta but the risk for the fetus is uncertain. It is unknown if this medication is excreted in breast milk.
Wegovy Counseling: I reviewed the possible side effects including: thyroid tumors, kidney disease, gallbladder disease, abdominal pain, constipation, diarrhea, nausea, vomiting and pancreatitis. Do not take this medication if you have a history or family history of multiple endocrine neoplasia syndrome type 2. Side effects reviewed, pt to contact office should one occur.
Siliq Counseling:  I discussed with the patient the risks of Siliq including but not limited to new or worsening depression, suicidal thoughts and behavior, immunosuppression, malignancy, posterior leukoencephalopathy syndrome, and serious infections.  The patient understands that monitoring is required including a PPD at baseline and must alert us or the primary physician if symptoms of infection or other concerning signs are noted. There is also a special program designed to monitor depression which is required with Siliq.
Glycopyrrolate Counseling:  I discussed with the patient the risks of glycopyrrolate including but not limited to skin rash, drowsiness, dry mouth, difficulty urinating, and blurred vision.
Azithromycin Pregnancy And Lactation Text: This medication is considered safe during pregnancy and is also secreted in breast milk.
Enbrel Counseling:  I discussed with the patient the risks of etanercept including but not limited to myelosuppression, immunosuppression, autoimmune hepatitis, demyelinating diseases, lymphoma, and infections.  The patient understands that monitoring is required including a PPD at baseline and must alert us or the primary physician if symptoms of infection or other concerning signs are noted.
Opioid Pregnancy And Lactation Text: These medications can lead to premature delivery and should be avoided during pregnancy. These medications are also present in breast milk in small amounts.
Calcipotriene Pregnancy And Lactation Text: The use of this medication during pregnancy or lactation is not recommended as there is insufficient data.
Itraconazole Counseling:  I discussed with the patient the risks of itraconazole including but not limited to liver damage, nausea/vomiting, neuropathy, and severe allergy.  The patient understands that this medication is best absorbed when taken with acidic beverages such as non-diet cola or ginger ale.  The patient understands that monitoring is required including baseline LFTs and repeat LFTs at intervals.  The patient understands that they are to contact us or the primary physician if concerning signs are noted.
Acitretin Pregnancy And Lactation Text: This medication is Pregnancy Category X and should not be given to women who are pregnant or may become pregnant in the future. This medication is excreted in breast milk.
Cibinqo Pregnancy And Lactation Text: It is unknown if this medication will adversely affect pregnancy or breast feeding.  You should not take this medication if you are currently pregnant or planning a pregnancy or while breastfeeding.
Litfulo Counseling: I discussed with the patient the risks of Litfulo therapy including but not limited to upper respiratory tract infections, shingles, cold sores, and nausea. Live vaccines should be avoided.  This medication has been linked to serious infections; higher rate of mortality; malignancy and lymphoproliferative disorders; major adverse cardiovascular events; thrombosis; gastrointestinal perforations; neutropenia; lymphopenia; anemia; liver enzyme elevations; and lipid elevations.
Albendazole Counseling:  I discussed with the patient the risks of albendazole including but not limited to cytopenia, kidney damage, nausea/vomiting and severe allergy.  The patient understands that this medication is being used in an off-label manner.
Azathioprine Pregnancy And Lactation Text: This medication is Pregnancy Category D and isn't considered safe during pregnancy. It is unknown if this medication is excreted in breast milk.
Topical Sulfur Applications Counseling: Topical Sulfur Counseling: Patient counseled that this medication may cause skin irritation or allergic reactions.  In the event of skin irritation, the patient was advised to reduce the amount of the drug applied or use it less frequently.   The patient verbalized understanding of the proper use and possible adverse effects of topical sulfur application.  All of the patient's questions and concerns were addressed.
Rhofade Counseling: Rhofade is a topical medication which can decrease superficial blood flow where applied. Side effects are uncommon and include stinging, redness and allergic reactions.
Topical Sulfur Applications Pregnancy And Lactation Text: This medication is considered safe during pregnancy and breast feeding secondary to limited systemic absorption.
Bexarotene Counseling:  I discussed with the patient the risks of bexarotene including but not limited to hair loss, dry lips/skin/eyes, liver abnormalities, hyperlipidemia, pancreatitis, depression/suicidal ideation, photosensitivity, drug rash/allergic reactions, hypothyroidism, anemia, leukopenia, infection, cataracts, and teratogenicity.  Patient understands that they will need regular blood tests to check lipid profile, liver function tests, white blood cell count, thyroid function tests and pregnancy test if applicable.
Quinolones Counseling:  I discussed with the patient the risks of fluoroquinolones including but not limited to GI upset, allergic reaction, drug rash, diarrhea, dizziness, photosensitivity, yeast infections, liver function test abnormalities, tendonitis/tendon rupture.
Otezla Pregnancy And Lactation Text: This medication is Pregnancy Category C and it isn't known if it is safe during pregnancy. It is unknown if it is excreted in breast milk.
Klisyri Counseling:  I discussed with the patient the risks of Klisyri including but not limited to erythema, scaling, itching, weeping, crusting, and pain.
Siliq Pregnancy And Lactation Text: The risk during pregnancy and breastfeeding is uncertain with this medication.
Cellcept Counseling:  I discussed with the patient the risks of mycophenolate mofetil including but not limited to infection/immunosuppression, GI upset, hypokalemia, hypercholesterolemia, bone marrow suppression, lymphoproliferative disorders, malignancy, GI ulceration/bleed/perforation, colitis, interstitial lung disease, kidney failure, progressive multifocal leukoencephalopathy, and birth defects.  The patient understands that monitoring is required including a baseline creatinine and regular CBC testing. In addition, patient must alert us immediately if symptoms of infection or other concerning signs are noted.
Rhofade Pregnancy And Lactation Text: This medication has not been assigned a Pregnancy Risk Category. It is unknown if the medication is excreted in breast milk.
Glycopyrrolate Pregnancy And Lactation Text: This medication is Pregnancy Category B and is considered safe during pregnancy. It is unknown if it is excreted breast milk.
Bactrim Counseling:  I discussed with the patient the risks of sulfa antibiotics including but not limited to GI upset, allergic reaction, drug rash, diarrhea, dizziness, photosensitivity, and yeast infections.  Rarely, more serious reactions can occur including but not limited to aplastic anemia, agranulocytosis, methemoglobinemia, blood dyscrasias, liver or kidney failure, lung infiltrates or desquamative/blistering drug rashes.
Taltz Counseling: I discussed with the patient the risks of ixekizumab including but not limited to immunosuppression, serious infections, worsening of inflammatory bowel disease and drug reactions.  The patient understands that monitoring is required including a PPD at baseline and must alert us or the primary physician if symptoms of infection or other concerning signs are noted.
Cantharidin Counseling:  I discussed with the patient the risks of Cantharidin including but not limited to pain, redness, burning, itching, and blistering.
Zepbound Counseling: I reviewed the possible side effects including: thyroid tumors, kidney disease, gallbladder disease, abdominal pain, constipation, diarrhea, nausea, vomiting and pancreatitis. Do not take this medication if you have a history or family history of multiple endocrine neoplasia syndrome type 2. Side effects reviewed, pt to contact office should one occur.
Bexarotene Pregnancy And Lactation Text: This medication is Pregnancy Category X and should not be given to women who are pregnant or may become pregnant. This medication should not be used if you are breast feeding.
Klisyri Pregnancy And Lactation Text: It is unknown if this medication can harm a developing fetus or if it is excreted in breast milk.
Simlandi Counseling:  I discussed with the patient the risks of adalimumab including but not limited to myelosuppression, immunosuppression, autoimmune hepatitis, demyelinating diseases, lymphoma, and serious infections.  The patient understands that monitoring is required including a PPD at baseline and must alert us or the primary physician if symptoms of infection or other concerning signs are noted.
Litfulo Pregnancy And Lactation Text: Based on animal studies, Lifulo may cause embryo-fetal harm when administered to pregnant women.  The medication should not be used in pregnancy.  Breastfeeding is not recommended during treatment.
Albendazole Pregnancy And Lactation Text: This medication is Pregnancy Category C and it isn't known if it is safe during pregnancy. It is also excreted in breast milk.
Ketoconazole Counseling:   Patient counseled regarding improving absorption with orange juice.  Adverse effects include but are not limited to breast enlargement, headache, diarrhea, nausea, upset stomach, liver function test abnormalities, taste disturbance, and stomach pain.  There is a rare possibility of liver failure that can occur when taking ketoconazole. The patient understands that monitoring of LFTs may be required, especially at baseline. The patient verbalized understanding of the proper use and possible adverse effects of ketoconazole.  All of the patient's questions and concerns were addressed.
Oxybutynin Counseling:  I discussed with the patient the risks of oxybutynin including but not limited to skin rash, drowsiness, dry mouth, difficulty urinating, and blurred vision.
Ivermectin Counseling:  Patient instructed to take medication on an empty stomach with a full glass of water.  Patient informed of potential adverse effects including but not limited to nausea, diarrhea, dizziness, itching, and swelling of the extremities or lymph nodes.  The patient verbalized understanding of the proper use and possible adverse effects of ivermectin.  All of the patient's questions and concerns were addressed.
Cantharidin Pregnancy And Lactation Text: This medication has not been proven safe during pregnancy. It is unknown if this medication is excreted in breast milk.
Humira Counseling:  I discussed with the patient the risks of adalimumab including but not limited to myelosuppression, immunosuppression, autoimmune hepatitis, demyelinating diseases, lymphoma, and serious infections.  The patient understands that monitoring is required including a PPD at baseline and must alert us or the primary physician if symptoms of infection or other concerning signs are noted.
Wartpeel Counseling:  I discussed with the patient the risks of Wartpeel including but not limited to erythema, scaling, itching, weeping, crusting, and pain.
Dutasteride Male Counseling: Dustasteride Counseling:  I discussed with the patient the risks of use of dutasteride including but not limited to decreased libido, decreased ejaculate volume, and gynecomastia. Women who can become pregnant should not handle medication.  All of the patient's questions and concerns were addressed.
Solaraze Counseling:  I discussed with the patient the risks of Solaraze including but not limited to erythema, scaling, itching, weeping, crusting, and pain.
Hydroxychloroquine Counseling:  I discussed with the patient that a baseline ophthalmologic exam is needed at the start of therapy and every year thereafter while on therapy. A CBC may also be warranted for monitoring.  The side effects of this medication were discussed with the patient, including but not limited to agranulocytosis, aplastic anemia, seizures, rashes, retinopathy, and liver toxicity. Patient instructed to call the office should any adverse effect occur.  The patient verbalized understanding of the proper use and possible adverse effects of Plaquenil.  All the patient's questions and concerns were addressed.
Bactrim Pregnancy And Lactation Text: This medication is Pregnancy Category D and is known to cause fetal risk.  It is also excreted in breast milk.
Olumiant Counseling: I discussed with the patient the risks of Olumiant therapy including but not limited to upper respiratory tract infections, shingles, cold sores, and nausea. Live vaccines should be avoided.  This medication has been linked to serious infections; higher rate of mortality; malignancy and lymphoproliferative disorders; major adverse cardiovascular events; thrombosis; gastrointestinal perforations; neutropenia; lymphopenia; anemia; liver enzyme elevations; and lipid elevations.
Arava Counseling:  Patient counseled regarding adverse effects of Arava including but not limited to nausea, vomiting, abnormalities in liver function tests. Patients may develop mouth sores, rash, diarrhea, and abnormalities in blood counts. The patient understands that monitoring is required including LFTs and blood counts.  There is a rare possibility of scarring of the liver and lung problems that can occur when taking methotrexate. Persistent nausea, loss of appetite, pale stools, dark urine, cough, and shortness of breath should be reported immediately. Patient advised to discontinue Arava treatment and consult with a physician prior to attempting conception. The patient will have to undergo a treatment to eliminate Arava from the body prior to conception.
Ketoconazole Pregnancy And Lactation Text: This medication is Pregnancy Category C and it isn't know if it is safe during pregnancy. It is also excreted in breast milk and breast feeding isn't recommended.
Isotretinoin Counseling: Patient should get monthly blood tests, not donate blood, not drive at night if vision affected, not share medication, and not undergo elective surgery for 6 months after tx completed. Side effects reviewed, pt to contact office should one occur.
Rifampin Counseling: I discussed with the patient the risks of rifampin including but not limited to liver damage, kidney damage, red-orange body fluids, nausea/vomiting and severe allergy.
Minoxidil Counseling: Minoxidil is a topical medication which can increase blood flow where it is applied. It is uncertain how this medication increases hair growth. Side effects are uncommon and include stinging and allergic reactions.
Solaraze Pregnancy And Lactation Text: This medication is Pregnancy Category B and is considered safe. There is some data to suggest avoiding during the third trimester. It is unknown if this medication is excreted in breast milk.
Dutasteride Female Counseling: Dutasteride Counseling:  I discussed with the patient the risks of use of dutasteride including but not limited to decreased libido and sexual dysfunction. Explained the teratogenic nature of the medication and stressed the importance of not getting pregnant during treatment. All of the patient's questions and concerns were addressed.
Hydroxychloroquine Pregnancy And Lactation Text: This medication has been shown to cause fetal harm but it isn't assigned a Pregnancy Risk Category. There are small amounts excreted in breast milk.
Cephalexin Counseling: I counseled the patient regarding use of cephalexin as an antibiotic for prophylactic and/or therapeutic purposes. Cephalexin (commonly prescribed under brand name Keflex) is a cephalosporin antibiotic which is active against numerous classes of bacteria, including most skin bacteria. Side effects may include nausea, diarrhea, gastrointestinal upset, rash, hives, yeast infections, and in rare cases, hepatitis, kidney disease, seizures, fever, confusion, neurologic symptoms, and others. Patients with severe allergies to penicillin medications are cautioned that there is about a 10% incidence of cross-reactivity with cephalosporins. When possible, patients with penicillin allergies should use alternatives to cephalosporins for antibiotic therapy.
5-Fu Counseling: 5-Fluorouracil Counseling:  I discussed with the patient the risks of 5-fluorouracil including but not limited to erythema, scaling, itching, weeping, crusting, and pain.
Propranolol Counseling:  I discussed with the patient the risks of propranolol including but not limited to low heart rate, low blood pressure, low blood sugar, restlessness and increased cold sensitivity. They should call the office if they experience any of these side effects.
Rifampin Pregnancy And Lactation Text: This medication is Pregnancy Category C and it isn't know if it is safe during pregnancy. It is also excreted in breast milk and should not be used if you are breast feeding.
Cyclophosphamide Counseling:  I discussed with the patient the risks of cyclophosphamide including but not limited to hair loss, hormonal abnormalities, decreased fertility, abdominal pain, diarrhea, nausea and vomiting, bone marrow suppression and infection. The patient understands that monitoring is required while taking this medication.
Tremfya Counseling: I discussed with the patient the risks of guselkumab including but not limited to immunosuppression, serious infections, and drug reactions.  The patient understands that monitoring is required including a PPD at baseline and must alert us or the primary physician if symptoms of infection or other concerning signs are noted.
Cephalexin Pregnancy And Lactation Text: This medication is Pregnancy Category B and considered safe during pregnancy.  It is also excreted in breast milk but can be used safely for shorter doses.
Simponi Counseling:  I discussed with the patient the risks of golimumab including but not limited to myelosuppression, immunosuppression, autoimmune hepatitis, demyelinating diseases, lymphoma, and serious infections.  The patient understands that monitoring is required including a PPD at baseline and must alert us or the primary physician if symptoms of infection or other concerning signs are noted.
Low Dose Naltrexone Counseling- I discussed with the patient the potential risks and side effects of low dose naltrexone including but not limited to: more vivid dreams, headaches, nausea, vomiting, abdominal pain, fatigue, dizziness, and anxiety.
Dutasteride Pregnancy And Lactation Text: This medication is absolutely contraindicated in women, especially during pregnancy and breast feeding. Feminization of male fetuses is possible if taking while pregnant.
Terbinafine Counseling: Patient counseling regarding adverse effects of terbinafine including but not limited to headache, diarrhea, rash, upset stomach, liver function test abnormalities, itching, taste/smell disturbance, nausea, abdominal pain, and flatulence.  There is a rare possibility of liver failure that can occur when taking terbinafine.  The patient understands that a baseline LFT and kidney function test may be required. The patient verbalized understanding of the proper use and possible adverse effects of terbinafine.  All of the patient's questions and concerns were addressed.
Winlevi Counseling:  I discussed with the patient the risks of topical clascoterone including but not limited to erythema, scaling, itching, and stinging. Patient voiced their understanding.
Isotretinoin Pregnancy And Lactation Text: This medication is Pregnancy Category X and is considered extremely dangerous during pregnancy. It is unknown if it is excreted in breast milk.
Olumiant Pregnancy And Lactation Text: Based on animal studies, Olumiant may cause embryo-fetal harm when administered to pregnant women.  The medication should not be used in pregnancy.  Breastfeeding is not recommended during treatment.
Hyrimoz Counseling:  I discussed with the patient the risks of adalimumab including but not limited to myelosuppression, immunosuppression, autoimmune hepatitis, demyelinating diseases, lymphoma, and serious infections.  The patient understands that monitoring is required including a PPD at baseline and must alert us or the primary physician if symptoms of infection or other concerning signs are noted.
Rinvoq Counseling: I discussed with the patient the risks of Rinvoq therapy including but not limited to upper respiratory tract infections, shingles, cold sores, bronchitis, nausea, cough, fever, acne, and headache. Live vaccines should be avoided.  This medication has been linked to serious infections; higher rate of mortality; malignancy and lymphoproliferative disorders; major adverse cardiovascular events; thrombosis; thrombocytopenia, anemia, and neutropenia; lipid elevations; liver enzyme elevations; and gastrointestinal perforations.
Cyclophosphamide Pregnancy And Lactation Text: This medication is Pregnancy Category D and it isn't considered safe during pregnancy. This medication is excreted in breast milk.
Sarecycline Counseling: Patient advised regarding possible photosensitivity and discoloration of the teeth, skin, lips, tongue and gums.  Patient instructed to avoid sunlight, if possible.  When exposed to sunlight, patients should wear protective clothing, sunglasses, and sunscreen.  The patient was instructed to call the office immediately if the following severe adverse effects occur:  hearing changes, easy bruising/bleeding, severe headache, or vision changes.  The patient verbalized understanding of the proper use and possible adverse effects of sarecycline.  All of the patient's questions and concerns were addressed.
Soolantra Counseling: I discussed with the patients the risks of topial Soolantra. This is a medicine which decreases the number of mites and inflammation in the skin. You experience burning, stinging, eye irritation or allergic reactions.  Please call our office if you develop any problems from using this medication.
Clofazimine Counseling:  I discussed with the patient the risks of clofazimine including but not limited to skin and eye pigmentation, liver damage, nausea/vomiting, gastrointestinal bleeding and allergy.
Winlevi Pregnancy And Lactation Text: This medication is considered safe during pregnancy and breastfeeding.
High Dose Vitamin A Counseling: Side effects reviewed, pt to contact office should one occur.
Finasteride Male Counseling: Finasteride Counseling:  I discussed with the patient the risks of use of finasteride including but not limited to decreased libido, decreased ejaculate volume, gynecomastia, and depression. Women should not handle medication.  All of the patient's questions and concerns were addressed.
Soolantra Pregnancy And Lactation Text: This medication is Pregnancy Category C. This medication is considered safe during breast feeding.
Clindamycin Counseling: I counseled the patient regarding use of clindamycin as an antibiotic for prophylactic and/or therapeutic purposes. Clindamycin is active against numerous classes of bacteria, including skin bacteria. Side effects may include nausea, diarrhea, gastrointestinal upset, rash, hives, yeast infections, and in rare cases, colitis.
Propranolol Pregnancy And Lactation Text: This medication is Pregnancy Category C and it isn't known if it is safe during pregnancy. It is excreted in breast milk.
Mirvaso Counseling: Mirvaso is a topical medication which can decrease superficial blood flow where applied. Side effects are uncommon and include stinging, redness and allergic reactions.
Cyclosporine Counseling:  I discussed with the patient the risks of cyclosporine including but not limited to hypertension, gingival hyperplasia,myelosuppression, immunosuppression, liver damage, kidney damage, neurotoxicity, lymphoma, and serious infections. The patient understands that monitoring is required including baseline blood pressure, CBC, CMP, lipid panel and uric acid, and then 1-2 times monthly CMP and blood pressure.
Low Dose Naltrexone Pregnancy And Lactation Text: Naltrexone is pregnancy category C.  There have been no adequate and well-controlled studies in pregnant women.  It should be used in pregnancy only if the potential benefit justifies the potential risk to the fetus.   Limited data indicates that naltrexone is minimally excreted into breastmilk.
Xolair Counseling:  Patient informed of potential adverse effects including but not limited to fever, muscle aches, rash and allergic reactions.  The patient verbalized understanding of the proper use and possible adverse effects of Xolair.  All of the patient's questions and concerns were addressed.
Drysol Counseling:  I discussed with the patient the risks of drysol/aluminum chloride including but not limited to skin rash, itching, irritation, burning.
Clindamycin Pregnancy And Lactation Text: This medication can be used in pregnancy if certain situations. Clindamycin is also present in breast milk.
Skyrizi Counseling: I discussed with the patient the risks of risankizumab-rzaa including but not limited to immunosuppression, and serious infections.  The patient understands that monitoring is required including a PPD at baseline and must alert us or the primary physician if symptoms of infection or other concerning signs are noted.
Rinvoq Pregnancy And Lactation Text: Based on animal studies, Rinvoq may cause embryo-fetal harm when administered to pregnant women.  The medication should not be used in pregnancy.  Breastfeeding is not recommended during treatment and for 6 days after the last dose.
Aklief counseling:  Patient advised to apply a pea-sized amount only at bedtime and wait 30 minutes after washing their face before applying.  If too drying, patient may add a non-comedogenic moisturizer.  The most commonly reported side effects including irritation, redness, scaling, dryness, stinging, burning, itching, and increased risk of sunburn.  The patient verbalized understanding of the proper use and possible adverse effects of retinoids.  All of the patient's questions and concerns were addressed.
High Dose Vitamin A Pregnancy And Lactation Text: High dose vitamin A therapy is contraindicated during pregnancy and breast feeding.
SSKI Counseling:  I discussed with the patient the risks of SSKI including but not limited to thyroid abnormalities, metallic taste, GI upset, fever, headache, acne, arthralgias, paraesthesias, lymphadenopathy, easy bleeding, arrhythmias, and allergic reaction.
Bimzelx Pregnancy And Lactation Text: This medication crosses the placenta and the safety is uncertain during pregnancy. It is unknown if this medication is present in breast milk.
Xolair Pregnancy And Lactation Text: This medication is Pregnancy Category B and is considered safe during pregnancy. This medication is excreted in breast milk.
Adbry Counseling: I discussed with the patient the risks of tralokinumab including but not limited to eye infection and irritation, cold sores, injection site reactions, worsening of asthma, allergic reactions and increased risk of parasitic infection.  Live vaccines should be avoided while taking tralokinumab. The patient understands that monitoring is required and they must alert us or the primary physician if symptoms of infection or other concerning signs are noted.
Ilumya Counseling: I discussed with the patient the risks of tildrakizumab including but not limited to immunosuppression, malignancy, posterior leukoencephalopathy syndrome, and serious infections.  The patient understands that monitoring is required including a PPD at baseline and must alert us or the primary physician if symptoms of infection or other concerning signs are noted.
VTAMA Counseling: I discussed with the patient that VTAMA is not for use in the eyes, mouth or mouth. They should call the office if they develop any signs of allergic reactions to VTAMA. The patient verbalized understanding of the proper use and possible adverse effects of VTAMA.  All of the patient's questions and concerns were addressed.
Finasteride Female Counseling: Finasteride Counseling:  I discussed with the patient the risks of use of finasteride including but not limited to decreased libido and sexual dysfunction. Explained the teratogenic nature of the medication and stressed the importance of not getting pregnant during treatment. All of the patient's questions and concerns were addressed.
Topical Retinoid counseling:  Patient advised to apply a pea-sized amount only at bedtime and wait 30 minutes after washing their face before applying.  If too drying, patient may add a non-comedogenic moisturizer. The patient verbalized understanding of the proper use and possible adverse effects of retinoids.  All of the patient's questions and concerns were addressed.
Niacinamide Counseling: I recommended taking niacin or niacinamide, also know as vitamin B3, twice daily. Recent evidence suggests that taking vitamin B3 (500 mg twice daily) can reduce the risk of actinic keratoses and non-melanoma skin cancers. Side effects of vitamin B3 include flushing and headache.
Topical Clindamycin Counseling: Patient counseled that this medication may cause skin irritation or allergic reactions.  In the event of skin irritation, the patient was advised to reduce the amount of the drug applied or use it less frequently.   The patient verbalized understanding of the proper use and possible adverse effects of clindamycin.  All of the patient's questions and concerns were addressed.
Cimzia Counseling:  I discussed with the patient the risks of Cimzia including but not limited to immunosuppression, allergic reactions and infections.  The patient understands that monitoring is required including a PPD at baseline and must alert us or the primary physician if symptoms of infection or other concerning signs are noted.
Xeljanz Counseling: I discussed with the patient the risks of Xeljanz therapy including increased risk of infection, liver issues, headache, diarrhea, or cold symptoms. Live vaccines should be avoided. They were instructed to call if they have any problems.
Tetracycline Counseling: Patient counseled regarding possible photosensitivity and increased risk for sunburn.  Patient instructed to avoid sunlight, if possible.  When exposed to sunlight, patients should wear protective clothing, sunglasses, and sunscreen.  The patient was instructed to call the office immediately if the following severe adverse effects occur:  hearing changes, easy bruising/bleeding, severe headache, or vision changes.  The patient verbalized understanding of the proper use and possible adverse effects of tetracycline.  All of the patient's questions and concerns were addressed. Patient understands to avoid pregnancy while on therapy due to potential birth defects.
Sski Pregnancy And Lactation Text: This medication is Pregnancy Category D and isn't considered safe during pregnancy. It is excreted in breast milk.
Aklief Pregnancy And Lactation Text: It is unknown if this medication is safe to use during pregnancy.  It is unknown if this medication is excreted in breast milk.  Breastfeeding women should use the topical cream on the smallest area of the skin for the shortest time needed while breastfeeding.  Do not apply to nipple and areola.
Opzelura Counseling:  I discussed with the patient the risks of Opzelura including but not limited to nasopharngitis, bronchitis, ear infection, eosinophila, hives, diarrhea, folliculitis, tonsillitis, and rhinorrhea.  Taken orally, this medication has been linked to serious infections; higher rate of mortality; malignancy and lymphoproliferative disorders; major adverse cardiovascular events; thrombosis; thrombocytopenia, anemia, and neutropenia; and lipid elevations.
Cimetidine Counseling:  I discussed with the patient the risks of Cimetidine including but not limited to gynecomastia, headache, diarrhea, nausea, drowsiness, arrhythmias, pancreatitis, skin rashes, psychosis, bone marrow suppression and kidney toxicity.
Finasteride Pregnancy And Lactation Text: This medication is absolutely contraindicated during pregnancy. It is unknown if it is excreted in breast milk.
Niacinamide Pregnancy And Lactation Text: These medications are considered safe during pregnancy.
Doxycycline Counseling:  Patient counseled regarding possible photosensitivity and increased risk for sunburn.  Patient instructed to avoid sunlight, if possible.  When exposed to sunlight, patients should wear protective clothing, sunglasses, and sunscreen.  The patient was instructed to call the office immediately if the following severe adverse effects occur:  hearing changes, easy bruising/bleeding, severe headache, or vision changes.  The patient verbalized understanding of the proper use and possible adverse effects of doxycycline.  All of the patient's questions and concerns were addressed.
Adbry Pregnancy And Lactation Text: It is unknown if this medication will adversely affect pregnancy or breast feeding.
Elidel Counseling: Patient may experience a mild burning sensation during topical application. Elidel is not approved in children less than 2 years of age. There have been case reports of hematologic and skin malignancies in patients using topical calcineurin inhibitors although causality is questionable.
Thalidomide Counseling: I discussed with the patient the risks of thalidomide including but not limited to birth defects, anxiety, weakness, chest pain, dizziness, cough and severe allergy.
Methotrexate Counseling:  Patient counseled regarding adverse effects of methotrexate including but not limited to nausea, vomiting, abnormalities in liver function tests. Patients may develop mouth sores, rash, diarrhea, and abnormalities in blood counts. The patient understands that monitoring is required including LFT's and blood counts.  There is a rare possibility of scarring of the liver and lung problems that can occur when taking methotrexate. Persistent nausea, loss of appetite, pale stools, dark urine, cough, and shortness of breath should be reported immediately. Patient advised to discontinue methotrexate treatment at least three months before attempting to become pregnant.  I discussed the need for folate supplements while taking methotrexate.  These supplements can decrease side effects during methotrexate treatment. The patient verbalized understanding of the proper use and possible adverse effects of methotrexate.  All of the patient's questions and concerns were addressed.
Spevigo Counseling: I discussed with the patient the risks of Spevigo including but not limited to fatigue, nasuea, vomiting, headache, pruritus, urinary tract infection, an infusion related reactions.  The patient understands that monitoring is required including screening for tuberculosis at baseline and yearly screening thereafter while continuing Spevigo therapy. They should contact us if symptoms of infection or other concerning signs are noted.
Opzelura Pregnancy And Lactation Text: There is insufficient data to evaluate drug-associated risk for major birth defects, miscarriage, or other adverse maternal or fetal outcomes.  There is a pregnancy registry that monitors pregnancy outcomes in pregnant persons exposed to the medication during pregnancy.  It is unknown if this medication is excreted in breast milk.  Do not breastfeed during treatment and for about 4 weeks after the last dose.
Cimzia Pregnancy And Lactation Text: This medication crosses the placenta but can be considered safe in certain situations. Cimzia may be excreted in breast milk.
Azelaic Acid Counseling: Patient counseled that medicine may cause skin irritation and to avoid applying near the eyes.  In the event of skin irritation, the patient was advised to reduce the amount of the drug applied or use it less frequently.   The patient verbalized understanding of the proper use and possible adverse effects of azelaic acid.  All of the patient's questions and concerns were addressed.
Nsaids Counseling: NSAID Counseling: I discussed with the patient that NSAIDs should be taken with food. Prolonged use of NSAIDs can result in the development of stomach ulcers.  Patient advised to stop taking NSAIDs if abdominal pain occurs.  The patient verbalized understanding of the proper use and possible adverse effects of NSAIDs.  All of the patient's questions and concerns were addressed.
Doxycycline Pregnancy And Lactation Text: This medication is Pregnancy Category D and not consider safe during pregnancy. It is also excreted in breast milk but is considered safe for shorter treatment courses.
Ozempic Counseling: I reviewed the possible side effects including: thyroid tumors, kidney disease, gallbladder disease, abdominal pain, constipation, diarrhea, nausea, vomiting and pancreatitis. Do not take this medication if you have a history or family history of multiple endocrine neoplasia syndrome type 2. Side effects reviewed, pt to contact office should one occur.
Birth Control Pills Counseling: Birth Control Pill Counseling: I discussed with the patient the potential side effects of OCPs including but not limited to increased risk of stroke, heart attack, thrombophlebitis, deep venous thrombosis, hepatic adenomas, breast changes, GI upset, headaches, and depression.  The patient verbalized understanding of the proper use and possible adverse effects of OCPs. All of the patient's questions and concerns were addressed.
Infliximab Counseling:  I discussed with the patient the risks of infliximab including but not limited to myelosuppression, immunosuppression, autoimmune hepatitis, demyelinating diseases, lymphoma, and serious infections.  The patient understands that monitoring is required including a PPD at baseline and must alert us or the primary physician if symptoms of infection or other concerning signs are noted.
Bimzelx Counseling:  I discussed with the patient the risks of Bimzelx including but not limited to depression, immunosuppression, allergic reactions and infections.  The patient understands that monitoring is required including a PPD at baseline and must alert us or the primary physician if symptoms of infection or other concerning signs are noted.
Zoryve Counseling:  I discussed with the patient that Zoryve is not for use in the eyes, mouth or vagina. The most commonly reported side effects include diarrhea, headache, insomnia, application site pain, upper respiratory tract infections, and urinary tract infections.  All of the patient's questions and concerns were addressed.
Xelmarleenz Pregnancy And Lactation Text: This medication is Pregnancy Category D and is not considered safe during pregnancy.  The risk during breast feeding is also uncertain.

## 2025-07-28 ENCOUNTER — APPOINTMENT (OUTPATIENT)
Dept: ADMISSIONS | Facility: MEDICAL CENTER | Age: 55
End: 2025-07-28
Attending: STUDENT IN AN ORGANIZED HEALTH CARE EDUCATION/TRAINING PROGRAM
Payer: COMMERCIAL

## 2025-07-29 ENCOUNTER — PRE-ADMISSION TESTING (OUTPATIENT)
Dept: ADMISSIONS | Facility: MEDICAL CENTER | Age: 55
End: 2025-07-29
Attending: STUDENT IN AN ORGANIZED HEALTH CARE EDUCATION/TRAINING PROGRAM
Payer: COMMERCIAL

## 2025-07-31 ENCOUNTER — ANESTHESIA EVENT (OUTPATIENT)
Dept: SURGERY | Facility: MEDICAL CENTER | Age: 55
End: 2025-07-31
Payer: COMMERCIAL

## 2025-07-31 ENCOUNTER — ANESTHESIA (OUTPATIENT)
Dept: SURGERY | Facility: MEDICAL CENTER | Age: 55
End: 2025-07-31
Payer: COMMERCIAL

## 2025-07-31 ENCOUNTER — HOSPITAL ENCOUNTER (OUTPATIENT)
Facility: MEDICAL CENTER | Age: 55
End: 2025-07-31
Attending: STUDENT IN AN ORGANIZED HEALTH CARE EDUCATION/TRAINING PROGRAM | Admitting: STUDENT IN AN ORGANIZED HEALTH CARE EDUCATION/TRAINING PROGRAM
Payer: COMMERCIAL

## 2025-07-31 VITALS
WEIGHT: 215.61 LBS | HEIGHT: 67 IN | OXYGEN SATURATION: 97 % | HEART RATE: 57 BPM | SYSTOLIC BLOOD PRESSURE: 117 MMHG | TEMPERATURE: 98.1 F | BODY MASS INDEX: 33.84 KG/M2 | DIASTOLIC BLOOD PRESSURE: 69 MMHG | RESPIRATION RATE: 16 BRPM

## 2025-07-31 LAB — EKG IMPRESSION: NORMAL

## 2025-07-31 PROCEDURE — 93005 ELECTROCARDIOGRAM TRACING: CPT | Mod: TC | Performed by: STUDENT IN AN ORGANIZED HEALTH CARE EDUCATION/TRAINING PROGRAM

## 2025-07-31 PROCEDURE — 502714 HCHG ROBOTIC SURGERY SERVICES: Performed by: STUDENT IN AN ORGANIZED HEALTH CARE EDUCATION/TRAINING PROGRAM

## 2025-07-31 PROCEDURE — 160195 HCHG PACU COMPLEX - 1ST 60 MINS: Performed by: STUDENT IN AN ORGANIZED HEALTH CARE EDUCATION/TRAINING PROGRAM

## 2025-07-31 PROCEDURE — 160015 HCHG STAT PREOP MINUTES: Performed by: STUDENT IN AN ORGANIZED HEALTH CARE EDUCATION/TRAINING PROGRAM

## 2025-07-31 PROCEDURE — 160025 RECOVERY II MINUTES (STATS): Performed by: STUDENT IN AN ORGANIZED HEALTH CARE EDUCATION/TRAINING PROGRAM

## 2025-07-31 PROCEDURE — 160002 HCHG RECOVERY MINUTES (STAT): Performed by: STUDENT IN AN ORGANIZED HEALTH CARE EDUCATION/TRAINING PROGRAM

## 2025-07-31 PROCEDURE — 160196 HCHG PACU COMPLEX - EA ADDL 30 MINS: Performed by: STUDENT IN AN ORGANIZED HEALTH CARE EDUCATION/TRAINING PROGRAM

## 2025-07-31 PROCEDURE — 64445 NJX AA&/STRD SCIATIC NRV IMG: CPT | Performed by: STUDENT IN AN ORGANIZED HEALTH CARE EDUCATION/TRAINING PROGRAM

## 2025-07-31 PROCEDURE — 700111 HCHG RX REV CODE 636 W/ 250 OVERRIDE (IP): Performed by: ANESTHESIOLOGY

## 2025-07-31 PROCEDURE — 700111 HCHG RX REV CODE 636 W/ 250 OVERRIDE (IP): Mod: JZ | Performed by: ANESTHESIOLOGY

## 2025-07-31 PROCEDURE — 700101 HCHG RX REV CODE 250: Performed by: STUDENT IN AN ORGANIZED HEALTH CARE EDUCATION/TRAINING PROGRAM

## 2025-07-31 PROCEDURE — 700111 HCHG RX REV CODE 636 W/ 250 OVERRIDE (IP): Performed by: STUDENT IN AN ORGANIZED HEALTH CARE EDUCATION/TRAINING PROGRAM

## 2025-07-31 PROCEDURE — 160048 HCHG OR STATISTICAL LEVEL 1-5: Performed by: STUDENT IN AN ORGANIZED HEALTH CARE EDUCATION/TRAINING PROGRAM

## 2025-07-31 PROCEDURE — 700105 HCHG RX REV CODE 258: Performed by: ANESTHESIOLOGY

## 2025-07-31 PROCEDURE — 700101 HCHG RX REV CODE 250: Performed by: ANESTHESIOLOGY

## 2025-07-31 PROCEDURE — 160191 HCHG ANESTHESIA STANDARD: Performed by: STUDENT IN AN ORGANIZED HEALTH CARE EDUCATION/TRAINING PROGRAM

## 2025-07-31 PROCEDURE — 160046 HCHG PACU - 1ST 60 MINS PHASE II: Performed by: STUDENT IN AN ORGANIZED HEALTH CARE EDUCATION/TRAINING PROGRAM

## 2025-07-31 RX ORDER — HYDROMORPHONE HYDROCHLORIDE 1 MG/ML
0.2 INJECTION, SOLUTION INTRAMUSCULAR; INTRAVENOUS; SUBCUTANEOUS
Status: DISCONTINUED | OUTPATIENT
Start: 2025-07-31 | End: 2025-07-31 | Stop reason: HOSPADM

## 2025-07-31 RX ORDER — MEPERIDINE HYDROCHLORIDE 25 MG/ML
6.25 INJECTION INTRAMUSCULAR; INTRAVENOUS; SUBCUTANEOUS
Status: DISCONTINUED | OUTPATIENT
Start: 2025-07-31 | End: 2025-07-31 | Stop reason: HOSPADM

## 2025-07-31 RX ORDER — DIPHENHYDRAMINE HYDROCHLORIDE 50 MG/ML
12.5 INJECTION, SOLUTION INTRAMUSCULAR; INTRAVENOUS
Status: DISCONTINUED | OUTPATIENT
Start: 2025-07-31 | End: 2025-07-31 | Stop reason: HOSPADM

## 2025-07-31 RX ORDER — BUPIVACAINE HYDROCHLORIDE 5 MG/ML
INJECTION, SOLUTION EPIDURAL; INTRACAUDAL; PERINEURAL
Status: DISCONTINUED | OUTPATIENT
Start: 2025-07-31 | End: 2025-07-31 | Stop reason: HOSPADM

## 2025-07-31 RX ORDER — HYDROMORPHONE HYDROCHLORIDE 1 MG/ML
0.4 INJECTION, SOLUTION INTRAMUSCULAR; INTRAVENOUS; SUBCUTANEOUS
Status: DISCONTINUED | OUTPATIENT
Start: 2025-07-31 | End: 2025-07-31 | Stop reason: HOSPADM

## 2025-07-31 RX ORDER — BUPIVACAINE HYDROCHLORIDE 2.5 MG/ML
INJECTION, SOLUTION EPIDURAL; INFILTRATION; INTRACAUDAL; PERINEURAL PRN
Status: DISCONTINUED | OUTPATIENT
Start: 2025-07-31 | End: 2025-07-31 | Stop reason: SURG

## 2025-07-31 RX ORDER — HALOPERIDOL 5 MG/ML
1 INJECTION INTRAMUSCULAR
Status: DISCONTINUED | OUTPATIENT
Start: 2025-07-31 | End: 2025-07-31 | Stop reason: HOSPADM

## 2025-07-31 RX ORDER — DEXMEDETOMIDINE HYDROCHLORIDE 100 UG/ML
INJECTION, SOLUTION INTRAVENOUS PRN
Status: DISCONTINUED | OUTPATIENT
Start: 2025-07-31 | End: 2025-07-31 | Stop reason: SURG

## 2025-07-31 RX ORDER — ONDANSETRON 2 MG/ML
4 INJECTION INTRAMUSCULAR; INTRAVENOUS
Status: COMPLETED | OUTPATIENT
Start: 2025-07-31 | End: 2025-07-31

## 2025-07-31 RX ORDER — ONDANSETRON 2 MG/ML
INJECTION INTRAMUSCULAR; INTRAVENOUS PRN
Status: DISCONTINUED | OUTPATIENT
Start: 2025-07-31 | End: 2025-07-31 | Stop reason: SURG

## 2025-07-31 RX ORDER — MIDAZOLAM HYDROCHLORIDE 1 MG/ML
INJECTION INTRAMUSCULAR; INTRAVENOUS
Status: COMPLETED
Start: 2025-07-31 | End: 2025-07-31

## 2025-07-31 RX ORDER — OXYCODONE HCL 5 MG/5 ML
10 SOLUTION, ORAL ORAL
Status: DISCONTINUED | OUTPATIENT
Start: 2025-07-31 | End: 2025-07-31 | Stop reason: HOSPADM

## 2025-07-31 RX ORDER — HYDROMORPHONE HYDROCHLORIDE 2 MG/ML
INJECTION, SOLUTION INTRAMUSCULAR; INTRAVENOUS; SUBCUTANEOUS PRN
Status: DISCONTINUED | OUTPATIENT
Start: 2025-07-31 | End: 2025-07-31 | Stop reason: SURG

## 2025-07-31 RX ORDER — MIDAZOLAM HYDROCHLORIDE 1 MG/ML
INJECTION INTRAMUSCULAR; INTRAVENOUS PRN
Status: DISCONTINUED | OUTPATIENT
Start: 2025-07-31 | End: 2025-07-31 | Stop reason: SURG

## 2025-07-31 RX ORDER — CEFAZOLIN SODIUM 1 G/3ML
INJECTION, POWDER, FOR SOLUTION INTRAMUSCULAR; INTRAVENOUS PRN
Status: DISCONTINUED | OUTPATIENT
Start: 2025-07-31 | End: 2025-07-31 | Stop reason: SURG

## 2025-07-31 RX ORDER — SODIUM CHLORIDE, SODIUM LACTATE, POTASSIUM CHLORIDE, CALCIUM CHLORIDE 600; 310; 30; 20 MG/100ML; MG/100ML; MG/100ML; MG/100ML
INJECTION, SOLUTION INTRAVENOUS CONTINUOUS
Status: ACTIVE | OUTPATIENT
Start: 2025-07-31 | End: 2025-07-31

## 2025-07-31 RX ORDER — OXYCODONE HCL 5 MG/5 ML
5 SOLUTION, ORAL ORAL
Status: DISCONTINUED | OUTPATIENT
Start: 2025-07-31 | End: 2025-07-31 | Stop reason: HOSPADM

## 2025-07-31 RX ORDER — KETOROLAC TROMETHAMINE 15 MG/ML
INJECTION, SOLUTION INTRAMUSCULAR; INTRAVENOUS PRN
Status: DISCONTINUED | OUTPATIENT
Start: 2025-07-31 | End: 2025-07-31 | Stop reason: SURG

## 2025-07-31 RX ORDER — LIDOCAINE HYDROCHLORIDE 20 MG/ML
INJECTION, SOLUTION EPIDURAL; INFILTRATION; INTRACAUDAL; PERINEURAL PRN
Status: DISCONTINUED | OUTPATIENT
Start: 2025-07-31 | End: 2025-07-31 | Stop reason: SURG

## 2025-07-31 RX ORDER — HYDROMORPHONE HYDROCHLORIDE 1 MG/ML
0.1 INJECTION, SOLUTION INTRAMUSCULAR; INTRAVENOUS; SUBCUTANEOUS
Status: DISCONTINUED | OUTPATIENT
Start: 2025-07-31 | End: 2025-07-31 | Stop reason: HOSPADM

## 2025-07-31 RX ORDER — SODIUM CHLORIDE, SODIUM LACTATE, POTASSIUM CHLORIDE, CALCIUM CHLORIDE 600; 310; 30; 20 MG/100ML; MG/100ML; MG/100ML; MG/100ML
INJECTION, SOLUTION INTRAVENOUS CONTINUOUS
Status: DISCONTINUED | OUTPATIENT
Start: 2025-07-31 | End: 2025-07-31 | Stop reason: HOSPADM

## 2025-07-31 RX ORDER — SODIUM CHLORIDE, SODIUM LACTATE, POTASSIUM CHLORIDE, CALCIUM CHLORIDE 600; 310; 30; 20 MG/100ML; MG/100ML; MG/100ML; MG/100ML
INJECTION, SOLUTION INTRAVENOUS
Status: DISCONTINUED | OUTPATIENT
Start: 2025-07-31 | End: 2025-07-31 | Stop reason: SURG

## 2025-07-31 RX ADMIN — ONDANSETRON 4 MG: 2 INJECTION INTRAMUSCULAR; INTRAVENOUS at 10:08

## 2025-07-31 RX ADMIN — CEFAZOLIN 2 G: 1 INJECTION, POWDER, FOR SOLUTION INTRAMUSCULAR; INTRAVENOUS at 09:05

## 2025-07-31 RX ADMIN — DEXMEDETOMIDINE 10 MCG: 100 INJECTION, SOLUTION INTRAVENOUS at 10:05

## 2025-07-31 RX ADMIN — LIDOCAINE HYDROCHLORIDE 40 MG: 20 INJECTION, SOLUTION EPIDURAL; INFILTRATION; INTRACAUDAL; PERINEURAL at 08:59

## 2025-07-31 RX ADMIN — PROPOFOL 150 MG: 10 INJECTION, EMULSION INTRAVENOUS at 08:59

## 2025-07-31 RX ADMIN — FENTANYL CITRATE 50 MCG: 50 INJECTION, SOLUTION INTRAMUSCULAR; INTRAVENOUS at 08:59

## 2025-07-31 RX ADMIN — DEXMEDETOMIDINE 10 MCG: 100 INJECTION, SOLUTION INTRAVENOUS at 09:57

## 2025-07-31 RX ADMIN — SODIUM CHLORIDE, POTASSIUM CHLORIDE, SODIUM LACTATE AND CALCIUM CHLORIDE: 600; 310; 30; 20 INJECTION, SOLUTION INTRAVENOUS at 08:53

## 2025-07-31 RX ADMIN — MIDAZOLAM HYDROCHLORIDE 2 MG: 1 INJECTION, SOLUTION INTRAMUSCULAR; INTRAVENOUS at 08:44

## 2025-07-31 RX ADMIN — BUPIVACAINE HYDROCHLORIDE 25 ML: 2.5 INJECTION, SOLUTION EPIDURAL; INFILTRATION; INTRACAUDAL at 08:46

## 2025-07-31 RX ADMIN — FENTANYL CITRATE 50 MCG: 50 INJECTION, SOLUTION INTRAMUSCULAR; INTRAVENOUS at 09:58

## 2025-07-31 RX ADMIN — ONDANSETRON 4 MG: 2 INJECTION INTRAMUSCULAR; INTRAVENOUS at 12:51

## 2025-07-31 RX ADMIN — FENTANYL CITRATE 50 MCG: 50 INJECTION, SOLUTION INTRAMUSCULAR; INTRAVENOUS at 09:45

## 2025-07-31 RX ADMIN — FENTANYL CITRATE 50 MCG: 50 INJECTION, SOLUTION INTRAMUSCULAR; INTRAVENOUS at 08:57

## 2025-07-31 RX ADMIN — KETOROLAC TROMETHAMINE 15 MG: 15 INJECTION, SOLUTION INTRAMUSCULAR; INTRAVENOUS at 10:08

## 2025-07-31 RX ADMIN — HYDROMORPHONE HYDROCHLORIDE 0.5 MG: 2 INJECTION INTRAMUSCULAR; INTRAVENOUS; SUBCUTANEOUS at 10:24

## 2025-07-31 RX ADMIN — DEXMEDETOMIDINE 10 MCG: 100 INJECTION, SOLUTION INTRAVENOUS at 10:16

## 2025-07-31 RX ADMIN — HALOPERIDOL LACTATE 1 MG: 5 INJECTION, SOLUTION INTRAMUSCULAR at 13:21

## 2025-07-31 ASSESSMENT — PAIN DESCRIPTION - PAIN TYPE
TYPE: SURGICAL PAIN
TYPE: ACUTE PAIN
TYPE: SURGICAL PAIN

## 2025-07-31 ASSESSMENT — FIBROSIS 4 INDEX: FIB4 SCORE: 1.2

## 2025-07-31 NOTE — ANESTHESIA PREPROCEDURE EVALUATION
Case: [id number] Date/Time: 07/31/25 0830    Procedure: PERONEAL TENDON REPAIR - LEFT LEG    Pre-op diagnosis: PERONEAL TENINITIS - LEFT LEG    Location:  OR 02 / SURGERY [facility]    Surgeons: KRISTIAN KumariPVIBHA            Relevant Problems   ANESTHESIA   (positive) Obstructive sleep apnea syndrome      CARDIAC   (positive) Retinal vasculitis      GI   (positive) GERD (gastroesophageal reflux disease)      ENDO   (positive) Hypothyroidism       Physical Exam    Airway   Mallampati: II  TM distance: >3 FB  Neck ROM: full       Cardiovascular - normal exam  Rhythm: regular  Rate: normal     Dental - normal exam           Pulmonary - normal examBreath sounds clear to auscultation     Abdominal    Neurological - normal exam                   Anesthesia Plan    ASA 2       Plan - general and peripheral nerve block     Peripheral nerve block will be post-op pain control  Airway plan will be LMA          Induction: intravenous    Postoperative Plan: Postoperative administration of opioids is intended.    Pertinent diagnostic labs and testing reviewed    Informed Consent:    Anesthetic plan and risks discussed with patient.    Use of blood products discussed with: patient whom consented to blood products.

## 2025-07-31 NOTE — ANESTHESIA TIME REPORT
Anesthesia Start and Stop Event Times       Date Time Event    7/31/2025 0851 Ready for Procedure     0853 Anesthesia Start     1033 Anesthesia Stop          Responsible Staff  07/31/25      Name Role Begin End    David Thomas M.D. Anesth 0853 1033          Overtime Reason:  no overtime (within assigned shift)    Comments:

## 2025-07-31 NOTE — ANESTHESIA PROCEDURE NOTES
Airway    Date/Time: 7/31/2025 9:00 AM    Performed by: David Thomas M.D.  Authorized by: David Thomas M.D.    Location:  OR  Urgency:  Elective  Difficult Airway: No    Indications for Airway Management:  Anesthesia      Spontaneous Ventilation: absent    Sedation Level:  Deep  Preoxygenated: Yes    Mask Difficulty Assessment:  0 - not attempted  Final Airway Type:  Supraglottic airway  Final Supraglottic Airway:  Standard LMA    SGA Size:  4  Number of Attempts at Approach:  1  Number of Other Approaches Attempted:  0

## 2025-07-31 NOTE — ANESTHESIA POSTPROCEDURE EVALUATION
Patient: Leigh Whitley    Procedure Summary       Date: 07/31/25 Room / Location:  OR  / SURGERY [facility]    Anesthesia Start: 0853 Anesthesia Stop: 1033    Procedure: PERONEAL TENDON REPAIR - LEFT LEG (Left: Foot) Diagnosis: (PERONEAL TENINITIS - LEFT LEG)    Surgeons: Audi Morrison D.P.M. Responsible Provider: David Thomas M.D.    Anesthesia Type: general, peripheral nerve block ASA Status: 2            Final Anesthesia Type: general, peripheral nerve block  Last vitals  BP   Blood Pressure: 117/69    Temp   36.7 °C (98.1 °F)    Pulse   (!) 57   Resp   16    SpO2   97 %      Anesthesia Post Evaluation    Patient location during evaluation: PACU  Patient participation: complete - patient participated  Level of consciousness: awake and alert    Airway patency: patent  Anesthetic complications: no  Cardiovascular status: hemodynamically stable  Respiratory status: acceptable  Hydration status: euvolemic    PONV: none    patient able to participate, but full recovery from regional anesthesia has not occurred and is not expected within the stipulated timeframe for the completion of the evaluation      There were no known notable events for this encounter.     Nurse Pain Score: 0 (NPRS)

## 2025-07-31 NOTE — ANESTHESIA PROCEDURE NOTES
Peripheral Block    Date/Time: 7/31/2025 8:38 AM    Performed by: David Thomas M.D.  Authorized by: David Thomas M.D.    Patient Location:  Pre-op  Start Time:  7/31/2025 8:38 AM  End Time:  7/31/2025 8:46 AM  Reason for Block: at surgeon's request and post-op pain management ONLY    patient identified, IV checked, site marked, risks and benefits discussed, surgical consent, monitors and equipment checked, pre-op evaluation and timeout performed    Patient Position:  Right lateral decubitus  Prep: ChloraPrep    Monitoring:  Heart rate, continuous pulse ox and cardiac monitor  Block Region:  Lower Extremity  Lower Extremity - Block Type:  SCIATIC nerve block, lateral approach    Laterality:  Left  Procedures: ultrasound guided  Image captured, interpreted and electronically stored.  Local Infiltration:  Lidocaine  Strength:  1 %  Dose:  3 ml  Block Type:  Single-shot  Needle Length:  100mm  Needle Gauge:  21 G  Needle Localization:  Ultrasound guidance  Ultrasound picture in chart  Injection Assessment:  Negative aspiration for heme, no paresthesia on injection, incremental injection and local visualized surrounding nerve on ultrasound  Evidence of intravascular injection: No     US Guided Sciatic Nerve Block   US probe placed several cm proximal to popliteal crease on posterior thigh and scanned caudad and cephalad until Sciatic Nerve (SN) identified superficial/lateral to popliteal artery.  Needle inserted lateral to probe in an in plane approach under direct visualization to a perineural position.  After negative aspiration LA injected with ease and visualized surrounding the SN.

## 2025-08-29 ENCOUNTER — OFFICE VISIT (OUTPATIENT)
Dept: MEDICAL GROUP | Facility: MEDICAL CENTER | Age: 55
End: 2025-08-29
Payer: COMMERCIAL

## 2025-08-29 VITALS
OXYGEN SATURATION: 95 % | SYSTOLIC BLOOD PRESSURE: 126 MMHG | DIASTOLIC BLOOD PRESSURE: 64 MMHG | HEART RATE: 79 BPM | TEMPERATURE: 98 F | HEIGHT: 67 IN | WEIGHT: 250 LBS | BODY MASS INDEX: 39.24 KG/M2

## 2025-08-29 DIAGNOSIS — E03.4 HYPOTHYROIDISM DUE TO ACQUIRED ATROPHY OF THYROID: ICD-10-CM

## 2025-08-29 DIAGNOSIS — E78.2 MIXED HYPERLIPIDEMIA: ICD-10-CM

## 2025-08-29 DIAGNOSIS — Z11.4 SCREENING FOR HIV (HUMAN IMMUNODEFICIENCY VIRUS): ICD-10-CM

## 2025-08-29 DIAGNOSIS — R94.31 ABNORMAL EKG: ICD-10-CM

## 2025-08-29 DIAGNOSIS — G47.33 OBSTRUCTIVE SLEEP APNEA SYNDROME: ICD-10-CM

## 2025-08-29 DIAGNOSIS — Z23 NEED FOR VACCINATION: ICD-10-CM

## 2025-08-29 DIAGNOSIS — Z12.31 ENCOUNTER FOR SCREENING MAMMOGRAM FOR MALIGNANT NEOPLASM OF BREAST: ICD-10-CM

## 2025-08-29 DIAGNOSIS — R42 LIGHTHEADED: ICD-10-CM

## 2025-08-29 DIAGNOSIS — Z00.00 PE (PHYSICAL EXAM), ANNUAL: Primary | ICD-10-CM

## 2025-08-29 DIAGNOSIS — Z11.59 NEED FOR HEPATITIS C SCREENING TEST: ICD-10-CM

## 2025-08-29 DIAGNOSIS — F32.A ANXIETY AND DEPRESSION: ICD-10-CM

## 2025-08-29 DIAGNOSIS — F41.9 ANXIETY AND DEPRESSION: ICD-10-CM

## 2025-08-29 RX ORDER — LEVOTHYROXINE SODIUM 88 UG/1
88 TABLET ORAL
Qty: 30 TABLET | Refills: 0 | Status: SHIPPED | OUTPATIENT
Start: 2025-08-29

## 2025-08-29 RX ORDER — CITALOPRAM HYDROBROMIDE 20 MG/1
20 TABLET ORAL DAILY
Qty: 90 TABLET | Refills: 1 | Status: SHIPPED | OUTPATIENT
Start: 2025-08-29

## 2025-08-29 ASSESSMENT — ENCOUNTER SYMPTOMS
MYALGIAS: 0
HEADACHES: 0
PALPITATIONS: 0
CHILLS: 0
POLYDIPSIA: 0
LOSS OF CONSCIOUSNESS: 0
WHEEZING: 0
EYE PAIN: 0
SORE THROAT: 0
DIARRHEA: 0
INSOMNIA: 0
WEIGHT LOSS: 0
NERVOUS/ANXIOUS: 1
NAUSEA: 0
COUGH: 0
FEVER: 0
WEAKNESS: 0
BLOOD IN STOOL: 0
CONSTIPATION: 0
SENSORY CHANGE: 0
FOCAL WEAKNESS: 0
SINUS PAIN: 0
VOMITING: 0
EYE REDNESS: 0
ABDOMINAL PAIN: 0
BRUISES/BLEEDS EASILY: 0
TREMORS: 0
FLANK PAIN: 0
DEPRESSION: 0
BACK PAIN: 0
SPUTUM PRODUCTION: 0
SHORTNESS OF BREATH: 0
SPEECH CHANGE: 0
EYE DISCHARGE: 0
DIZZINESS: 0

## 2025-08-29 ASSESSMENT — PATIENT HEALTH QUESTIONNAIRE - PHQ9
4. FEELING TIRED OR HAVING LITTLE ENERGY: NOT AT ALL
SUM OF ALL RESPONSES TO PHQ9 QUESTIONS 1 AND 2: 0
8. MOVING OR SPEAKING SO SLOWLY THAT OTHER PEOPLE COULD HAVE NOTICED. OR THE OPPOSITE, BEING SO FIGETY OR RESTLESS THAT YOU HAVE BEEN MOVING AROUND A LOT MORE THAN USUAL: NOT AT ALL
9. THOUGHTS THAT YOU WOULD BE BETTER OFF DEAD, OR OF HURTING YOURSELF: NOT AT ALL
7. TROUBLE CONCENTRATING ON THINGS, SUCH AS READING THE NEWSPAPER OR WATCHING TELEVISION: NOT AT ALL
SUM OF ALL RESPONSES TO PHQ QUESTIONS 1-9: 0
1. LITTLE INTEREST OR PLEASURE IN DOING THINGS: NOT AT ALL
6. FEELING BAD ABOUT YOURSELF - OR THAT YOU ARE A FAILURE OR HAVE LET YOURSELF OR YOUR FAMILY DOWN: NOT AL ALL
5. POOR APPETITE OR OVEREATING: NOT AT ALL
3. TROUBLE FALLING OR STAYING ASLEEP OR SLEEPING TOO MUCH: NOT AT ALL
2. FEELING DOWN, DEPRESSED, IRRITABLE, OR HOPELESS: NOT AT ALL

## 2025-08-29 ASSESSMENT — FIBROSIS 4 INDEX: FIB4 SCORE: 1.2

## 2025-09-23 ENCOUNTER — APPOINTMENT (OUTPATIENT)
Dept: MEDICAL GROUP | Facility: MEDICAL CENTER | Age: 55
End: 2025-09-23
Payer: COMMERCIAL

## (undated) DEVICE — SODIUM CHL IRRIGATION 0.9% 1000ML (12EA/CA)

## (undated) DEVICE — Device

## (undated) DEVICE — TUBING CLEARLINK DUO-VENT - C-FLO (48EA/CA)

## (undated) DEVICE — GLOVE BIOGEL PI INDICATOR SZ 7.5 SURGICAL PF LF -(50/BX 4BX/CA)

## (undated) DEVICE — CANNULA W/SEAL 5X100 Z-THRE - ADED KII (12/BX)

## (undated) DEVICE — LACTATED RINGERS INJ 1000 ML - (14EA/CA 60CA/PF)

## (undated) DEVICE — DRAPESURG STERI-DRAPE LONG - (10/BX 4BX/CA)

## (undated) DEVICE — SET LEADWIRE 5 LEAD BEDSIDE DISPOSABLE ECG (1SET OF 5/EA)

## (undated) DEVICE — GOWN WARMING STANDARD FLEX - (30/CA)

## (undated) DEVICE — DRESSING 3X8 ADAPTIC GAUZE - NON-ADHERING (36/PK 6PK/BX)

## (undated) DEVICE — ELECTRODE DUAL RETURN W/ CORD - (50/PK)

## (undated) DEVICE — SUTURE 4-0 VICRYL PLUSFS-1 - 27 INCH (36/BX)

## (undated) DEVICE — SUTURE GENERAL

## (undated) DEVICE — GLOVE SZ 7 BIOGEL PI MICRO - PF LF (50PR/BX 4BX/CA)

## (undated) DEVICE — DRAPE LOWER EXTREMETY - (6/CA)

## (undated) DEVICE — PACK GASTRIC BANDING OR - (1/CA)

## (undated) DEVICE — ELECTRODE 5MM LHK LAPSCP STERILE DISP- MEGADYNE  (5/CA)

## (undated) DEVICE — SUTURE 4-0 MONOCRYL PLUS PS-2 - 27 INCH (36/BX)

## (undated) DEVICE — SUTURE 4-0 ETHILON FS-2 18 (36PK/BX)"

## (undated) DEVICE — SUTURE 0 VICRYL PLUS CT-1 - 36 INCH (36/BX)

## (undated) DEVICE — COVER LIGHT HANDLE ALC PLUS DISP (18EA/BX)

## (undated) DEVICE — TROCAR SEPARATOR 15MMZTHREAD - (6/BX)

## (undated) DEVICE — GLOVE BIOGEL SZ 8 SURGICAL PF LTX - (50PR/BX 4BX/CA)

## (undated) DEVICE — TOWEL STOP TIMEOUT SAFETY FLAG (40EA/CA)

## (undated) DEVICE — CHLORAPREP 26 ML APPLICATOR - ORANGE TINT(25/CA)

## (undated) DEVICE — GOWN WARMING X-LARGE FLEX - (20/CA)

## (undated) DEVICE — SENSOR OXIMETER ADULT SPO2 RD SET (20EA/BX)

## (undated) DEVICE — GLOVE BIOGEL INDICATOR SZ 8.5 SURGICAL PF LTX - (50/BX 4BX/CA)

## (undated) DEVICE — CANISTER SUCTION 3000ML MECHANICAL FILTER AUTO SHUTOFF MEDI-VAC NONSTERILE LF DISP  (40EA/CA)

## (undated) DEVICE — PAD PREP 24 X 48 CUFFED - (100/CA)

## (undated) DEVICE — SUCTION INSTRUMENT YANKAUER BULBOUS TIP W/O VENT (50EA/CA)

## (undated) DEVICE — SCISSORS 5MM CVD (6EA/BX)

## (undated) DEVICE — SET EXTENSION WITH 2 PORTS (48EA/CA) ***PART #2C8610 IS A SUBSTITUTE*****

## (undated) DEVICE — FIBERWIRE 4-0 - (12/BX)

## (undated) DEVICE — SET TUBING PNEUMOCLEAR HIGH FLOW SMOKE EVACUATION (10EA/BX)

## (undated) DEVICE — TROCAR 5X100 NON BLADED Z-TH - READ KII (6/BX)

## (undated) DEVICE — PAD PROVIDONE IODINE 2-1/8X1 (100EA/BX)

## (undated) DEVICE — SUTURE 2-0 VICRYL PLUS SH - 27 INCH (36/BX)

## (undated) DEVICE — SLEEVE VASO DVT COMPRESSION CALF MED - (10PR/CA)

## (undated) DEVICE — SUTURE 2-0 VICRYL PLUS CT-1 36 (36PK/BX)"